# Patient Record
Sex: FEMALE | Race: WHITE | NOT HISPANIC OR LATINO | Employment: OTHER | URBAN - METROPOLITAN AREA
[De-identification: names, ages, dates, MRNs, and addresses within clinical notes are randomized per-mention and may not be internally consistent; named-entity substitution may affect disease eponyms.]

---

## 2017-05-04 ASSESSMENT — MIFFLIN-ST. JEOR: SCORE: 1268.38

## 2017-05-07 ENCOUNTER — ANESTHESIA - HEALTHEAST (OUTPATIENT)
Dept: SURGERY | Facility: CLINIC | Age: 47
End: 2017-05-07

## 2017-05-08 ENCOUNTER — SURGERY - HEALTHEAST (OUTPATIENT)
Dept: SURGERY | Facility: CLINIC | Age: 47
End: 2017-05-08

## 2017-05-08 ENCOUNTER — RECORDS - HEALTHEAST (OUTPATIENT)
Dept: ADMINISTRATIVE | Facility: OTHER | Age: 47
End: 2017-05-08

## 2017-05-08 LAB
BKR LAB AP ABNORMAL BLEEDING: NO
BKR LAB AP BIRTH CONTROL/HORMONES: NORMAL
BKR LAB AP CERVICAL APPEARANCE: NORMAL
BKR LAB AP GYN ADEQUACY: NORMAL
BKR LAB AP GYN INTERPRETATION: NORMAL
BKR LAB AP HPV REFLEX: NORMAL
BKR LAB AP LMP: NORMAL
BKR LAB AP PATIENT STATUS: NORMAL
BKR LAB AP PREVIOUS ABNORMAL: NORMAL
BKR LAB AP PREVIOUS NORMAL: NORMAL
HIGH RISK?: NO
HPV INTERPRETATION - HISTORICAL: NORMAL
HPV INTERPRETER - HISTORICAL: NORMAL
PATH REPORT.COMMENTS IMP SPEC: NORMAL
RESULT FLAG (HE HISTORICAL CONVERSION): NORMAL

## 2018-07-18 ENCOUNTER — RECORDS - HEALTHEAST (OUTPATIENT)
Dept: LAB | Facility: CLINIC | Age: 48
End: 2018-07-18

## 2018-07-18 LAB
CHOLEST SERPL-MCNC: 241 MG/DL
FASTING STATUS PATIENT QL REPORTED: NO
HDLC SERPL-MCNC: 83 MG/DL
LDLC SERPL CALC-MCNC: 122 MG/DL
TRIGL SERPL-MCNC: 182 MG/DL
TSH SERPL DL<=0.005 MIU/L-ACNC: 0.53 UIU/ML (ref 0.3–5)

## 2018-11-09 ENCOUNTER — RECORDS - HEALTHEAST (OUTPATIENT)
Dept: ADMINISTRATIVE | Facility: OTHER | Age: 48
End: 2018-11-09

## 2018-11-09 ENCOUNTER — RECORDS - HEALTHEAST (OUTPATIENT)
Dept: LAB | Facility: CLINIC | Age: 48
End: 2018-11-09

## 2018-11-11 LAB — BACTERIA SPEC CULT: ABNORMAL

## 2018-11-27 ENCOUNTER — RECORDS - HEALTHEAST (OUTPATIENT)
Dept: ADMINISTRATIVE | Facility: OTHER | Age: 48
End: 2018-11-27

## 2019-01-16 ENCOUNTER — RECORDS - HEALTHEAST (OUTPATIENT)
Dept: ADMINISTRATIVE | Facility: OTHER | Age: 49
End: 2019-01-16

## 2019-01-22 ENCOUNTER — RECORDS - HEALTHEAST (OUTPATIENT)
Dept: ADMINISTRATIVE | Facility: OTHER | Age: 49
End: 2019-01-22

## 2019-01-22 ENCOUNTER — AMBULATORY - HEALTHEAST (OUTPATIENT)
Dept: VASCULAR SURGERY | Facility: CLINIC | Age: 49
End: 2019-01-22

## 2019-01-22 DIAGNOSIS — S81.001D OPEN WOUND OF RIGHT KNEE, SUBSEQUENT ENCOUNTER: ICD-10-CM

## 2019-02-05 ENCOUNTER — COMMUNICATION - HEALTHEAST (OUTPATIENT)
Dept: VASCULAR SURGERY | Facility: CLINIC | Age: 49
End: 2019-02-05

## 2020-05-12 ENCOUNTER — RECORDS - HEALTHEAST (OUTPATIENT)
Dept: ADMINISTRATIVE | Facility: OTHER | Age: 50
End: 2020-05-12

## 2020-05-12 ENCOUNTER — HOSPITAL ENCOUNTER (OUTPATIENT)
Dept: PHYSICAL MEDICINE AND REHAB | Facility: CLINIC | Age: 50
Discharge: HOME | End: 2020-05-12
Attending: PAIN MEDICINE

## 2020-05-12 DIAGNOSIS — S22.49XA MULTIPLE RIB FRACTURES INVOLVING FOUR OR MORE RIBS: ICD-10-CM

## 2020-05-12 DIAGNOSIS — M79.18 MYOFASCIAL PAIN: ICD-10-CM

## 2020-05-14 ENCOUNTER — HOSPITAL ENCOUNTER (OUTPATIENT)
Dept: PHYSICAL MEDICINE AND REHAB | Facility: CLINIC | Age: 50
Discharge: HOME | End: 2020-05-14
Attending: PAIN MEDICINE

## 2020-05-14 DIAGNOSIS — S22.49XA MULTIPLE RIB FRACTURES INVOLVING FOUR OR MORE RIBS: ICD-10-CM

## 2020-05-22 ENCOUNTER — HOSPITAL ENCOUNTER (OUTPATIENT)
Dept: PHYSICAL MEDICINE AND REHAB | Facility: CLINIC | Age: 50
Discharge: HOME | End: 2020-05-22
Attending: PAIN MEDICINE

## 2020-05-22 DIAGNOSIS — S22.49XA MULTIPLE RIB FRACTURES INVOLVING FOUR OR MORE RIBS: ICD-10-CM

## 2020-05-22 DIAGNOSIS — M79.18 MYOFASCIAL PAIN: ICD-10-CM

## 2020-05-28 ENCOUNTER — HOSPITAL ENCOUNTER (OUTPATIENT)
Dept: PHYSICAL MEDICINE AND REHAB | Facility: CLINIC | Age: 50
Discharge: HOME | End: 2020-05-28
Attending: PAIN MEDICINE

## 2020-05-28 DIAGNOSIS — M79.18 MYOFASCIAL PAIN: ICD-10-CM

## 2020-05-28 DIAGNOSIS — S22.49XA MULTIPLE RIB FRACTURES INVOLVING FOUR OR MORE RIBS: ICD-10-CM

## 2020-05-28 RX ORDER — SALSALATE 500 MG/1
TABLET, FILM COATED ORAL
Status: SHIPPED | COMMUNITY
Start: 2020-05-22 | End: 2021-08-27

## 2021-04-01 ENCOUNTER — RECORDS - HEALTHEAST (OUTPATIENT)
Dept: ADMINISTRATIVE | Facility: OTHER | Age: 51
End: 2021-04-01

## 2021-04-01 ENCOUNTER — RECORDS - HEALTHEAST (OUTPATIENT)
Dept: SCHEDULING | Facility: CLINIC | Age: 51
End: 2021-04-01

## 2021-04-01 DIAGNOSIS — Z12.31 VISIT FOR SCREENING MAMMOGRAM: ICD-10-CM

## 2021-05-26 ENCOUNTER — RECORDS - HEALTHEAST (OUTPATIENT)
Dept: ADMINISTRATIVE | Facility: CLINIC | Age: 51
End: 2021-05-26

## 2021-05-28 ENCOUNTER — RECORDS - HEALTHEAST (OUTPATIENT)
Dept: ADMINISTRATIVE | Facility: CLINIC | Age: 51
End: 2021-05-28

## 2021-05-30 VITALS — WEIGHT: 141 LBS | HEIGHT: 66 IN | BODY MASS INDEX: 22.66 KG/M2

## 2021-06-08 NOTE — PROGRESS NOTES
"Evelyn Hudson is a 49 y.o. female who is being evaluated via a billable video visit.      The patient has been notified of following:     \"This video visit will be conducted via a call between you and your physician/provider. We have found that certain health care needs can be provided without the need for an in-person physical exam.  This service lets us provide the care you need with a video conversation.  If a prescription is necessary we can send it directly to your pharmacy.  If lab work is needed we can place an order for that and you can then stop by our lab to have the test done at a later time.    Video visits are billed at different rates depending on your insurance coverage. Please reach out to your insurance provider with any questions.    If during the course of the call the physician/provider feels a video visit is not appropriate, you will not be charged for this service.\"    Patient has given verbal consent to a Video visit? Yes    Patient would like to receive their AVS by AVS Preference: Mail a copy.    Patient would like the video invitation sent by: Text to cell phone: [phone]    Will anyone else be joining your video visit? No        Video Start Time: 09:02    Additional provider notes:   ASSESSMENT: Evelyn Hudson is a 49 y.o. female who presents for consultation at the request of [facility] PCP Maribell Betancur CNP, with a past medical history significant for abnormal uterine bleeding, multiple rib fractures involving for more ribs, alcohol withdrawal uncomplicated, fall, injectable pain, anxiety, chemical dependency, seasonal allergies who presents today for new patient evaluation of pain from rib fractures:    -Patient has intractable pain from right rib fractures of 7, 8, 9, 10 that are displaced.  She had good temporary relief with intercostal nerve blocks in the hospital.  She has been hospitalized twice secondary to intractable pain.  Her primary care provider continues to provide pain " control with Dilaudid 2 to 4 mg every 4 hours as needed as well as methocarbamol and salicylate.    Patient is neurologically intact on exam. No myelopathic or red flag symptoms.     NDI Score: 56    WHO 5: 4     Diagnoses and all orders for this visit:    Multiple rib fractures involving four or more ribs  -     OPS US Guided Intercostal Block; Future; Expected date: 05/19/2020    Myofascial pain      PLAN:  Reviewed spine anatomy and disease process. Discussed diagnosis and treatment options with the patient today. A shared decision making model was used.  The patient's values and choices were respected. The following represents what was discussed and decided upon by the provider and the patient.      -DIAGNOSTIC TESTS:  Images were personally reviewed and interpreted and explained to patient today using spine model.   --X-ray on 4/28/2020 is personally viewed images interpreted and discussed with the patient.  It does show fractures of ribs of posterior right seventh eighth ninth and 10th ribs with no pneumothorax.    -PHYSICAL THERAPY: None at this time.  I recommend that she continue with incentive spirometry which she is.    -MEDICATIONS: Patient is currently taking 2 to 4 mg of hydromorphone every 4 hours as well as methocarbamol every 8 hours and salicylate to 3 times daily 500 mg.  -  Discussed multiple medication options today with patient. Discussed risks, side effects, and proper use of medications. Patient verbalized understanding.    -INTERVENTIONS: I have ordered right ribs 7, 8, 9, and 10 intercostal nerve blocks under ultrasound guidance urgently this Thursday.  Discussed risks and benefits of injections with patient today.    -PATIENT EDUCATION: We discussed pain management in a multimodal fashion including medication and injections.    -FOLLOW-UP:   Patient will follow-up 1 week after the intercostal nerve blocks via video visit.    Advised patient to call the [facility] if symptoms worsen or  you have problems controlling bladder and bowel function.   ______________________________________________________________________    SUBJECTIVE:  HPI:  Evelyn Hudson  Is a 49 y.o. female who presents today for new patient evaluation of right-sided rib cage pain secondary to rib fractures.  Patient fell off a ladder with subsequent for rib fractures was admitted for pain control on 4/28/2020 and discharged on 5/1/2020.  She obtained intercostal nerve blocks while in the hospital.  She was discharged on hydromorphone 2 to 4 mg every 4 hours as needed.  She is also discharged on methocarbamol 750 mg every 8 hours as needed.  The patient was referred to the spine center for further evaluation and follow-up.  Patient did have good temporary relief with the intercostal nerve blocks, however pain has returned.  She reports that pain is better than what it was when she was in the hospital.  She reports that she was hospitalized twice for pain and after the intercostal nerve blocks pain did lessen some.  She is currently taking Dilaudid 2 to 4 mg every 4 hours as well as methocarbamol 750 mg every 8 hours and salicylate 500 mg twice daily.  She notes that this combination of pain medication has been making things more tolerable for her.  She notes that pain is worse when she is sitting and somewhat improved when she is lying down or standing.  She does have a soft wrap type brace around her waist which she applies ice to her ribs constantly.  This does help some.  She does have a history of alcoholism and after this fall was drinking alcohol to try and cope with pain before going in for about 4 days.  Now this is not a problem per the patient.  Patient does take amoxicillin for acne, however does not have any infections at this time.  Her pain today is 7/10.  It is along the right posterior lateral rib cage area.  She denies any bowel or bladder changes, fevers, chills, unintentional weight loss.    -Treatment to Date:  X-ray of rib cage dated 4/28/2020.  Right sixth through 11th intercostal nerve blocks under fluoroscopic guidance on 4/30/2020.    -Medications:    Current Outpatient Medications on File Prior to Encounter   Medication Sig Dispense Refill     acetaminophen (TYLENOL) 500 MG tablet Take 2 tablets (1,000 mg total) by mouth 3 (three) times a day.  0     amoxicillin (AMOXIL) 500 MG capsule Take 500 mg by mouth 2 (two) times a day.       calcium carbonate-vitamin D3 (CALCIUM 600 + D,3,) 600 mg(1,500mg) -200 unit per tablet Take 1 tablet by mouth at bedtime.        carboxymethylcellulose (REFRESH LIQUIGEL) 1 % ophthalmic solution Apply 1 drop to eye 2 (two) times a day as needed.       cetirizine-pseudoephedrine (ZYRTEC-D) 5-120 mg per tablet Take 1 tablet by mouth 2 (two) times a day.       escitalopram oxalate (LEXAPRO) 20 MG tablet Take 20 mg by mouth daily.       fluticasone (FLONASE) 50 mcg/actuation nasal spray 2 sprays into each nostril daily.       folic acid (FOLVITE) 1 MG tablet Take 1 tablet (1 mg total) by mouth daily. 90 tablet 0     glucosamine-chondroitin 500-400 mg cap Take 1 capsule by mouth at bedtime.        HYDROmorphone (DILAUDID) 2 MG tablet Take 1-2 tablets (2-4 mg total) by mouth every 4 (four) hours as needed for pain. 32 tablet 0     Lactobacillus rhamnosus GG (CULTURELLE) 10-15 Billion cell capsule Take 1 capsule by mouth at bedtime.        methocarbamoL (ROBAXIN) 750 MG tablet Take 1 tablet (750 mg total) by mouth every 8 (eight) hours as needed. 21 tablet 0     multivitamin capsule Take 1 capsule by mouth at bedtime.        nicotine polacrilex (NICORETTE) 4 MG gum Apply 4 mg to the mouth or throat as needed for smoking cessation.       omeprazole (PRILOSEC) 20 MG capsule Take 1 capsule (20 mg total) by mouth 2 (two) times a day before meals. 60 capsule 0     polyethylene glycol (MIRALAX) 17 gram packet Take 1 packet (17 g total) by mouth daily as needed.  0     thiamine 100 MG tablet Take 1  tablet (100 mg total) by mouth daily.  0     traZODone (DESYREL) 50 MG tablet Take 50 mg by mouth at bedtime.        naproxen sodium (ALEVE) 220 MG tablet Take 220 mg by mouth every 12 (twelve) hours as needed for pain.       No current facility-administered medications on file prior to encounter.        Allergies   Allergen Reactions     Ciprofloxacin Anaphylaxis     Throat swelling       Past Medical History:   Diagnosis Date     Acne      Allergic rhinitis      Anxiety         Patient Active Problem List   Diagnosis     Abnormal uterine bleeding     Multiple rib fractures involving four or more ribs     Alcohol withdrawal, uncomplicated (H)     Fall, initial encounter     Closed fracture of four ribs, unspecified laterality, initial encounter     Intractable pain     Anxiety state     Chemical dependency (H)     Seasonal allergies       Past Surgical History:   Procedure Laterality Date     COLON SURGERY       IR INTERCOSTAL STEROID INJECTION SINGLE LEVEL  4/30/2020     LIPOMA RESECTION N/A 5/8/2017    Procedure: EXCISION BACK AND RIGHT ARM LIPOMA;  Surgeon: Rickey Anne MD;  Location: [facility];  Service:      RECTAL POLYPECTOMY       TUBAL LIGATION Bilateral 6/3/2014    Procedure: BILATERAL LAPAROSCOPIC TUBAL LIGATION ;  Surgeon: Lorena Jiménez MD;  Location: [facility];  Service:      Family history: Her mother has had cancer.  Her father has heart disease and has had a stroke.  Reviewed past medical, surgical, and family history with patient found on new patient intake packet located in [employer]MR [employer] tab.     SOCIAL HX: Patient does smoke.  She does have a history of alcoholism, however has been sober for 6 years and recently started drinking occasionally in the last year.  She denies use of recreational drugs.    ROS: Specifically negative for bowel/bladder dysfunction, balance changes, headache, dizziness, foot drop, fevers, chills, appetite changes, nausea/vomiting, unexplained weight  loss. Otherwise 13 systems reviewed are negative. Please see the patient's intake questionnaire from today for details.    OBJECTIVE:  There were no vitals taken for this visit.    PHYSICAL EXAMINATION:    --CONSTITUTIONAL:  Vital signs as above.  No acute distress.  The patient is well nourished and well groomed.  --PSYCHIATRIC:  Appropriate mood and affect. The patient is awake, alert, oriented to person, place, time and answering questions appropriately with clear speech.  Patient is crying on exam secondary to pain.  --SKIN:  Skin over the face and posterior torso is covered with a stretch brace.  --RESPIRATORY: Normal rhythm and effort. No abnormal accessory muscle breathing patterns noted.   --ABDOMINAL:  Soft, non-distended, non-tender throughout all quadrants.   --STANDING EXAMINATION:  Normal lumbar lordosis noted, no lateral shift.  --MUSCULOSKELETAL: Thoracic spine inspection reveals no evidence of deformity.  She points to her right posterior lateral rib cage where her pain is.  --GROSS MOTOR: Gait is non-antalgic.    --LOWER EXTREMITY MOTOR TESTING:  Lower extremity strength appears to be full.    RESULTS: Prior medical records from [facility] discharge summary were reviewed today.    Imaging: Lumbar spine Imaging was personally reviewed and interpreted today. The images were shown to the patient and the findings were explained using a spine model.      Xr Chest 2 Views    Result Date: 4/28/2020  EXAM: XR CHEST 2 VIEWS LOCATION: [facility] DATE/TIME: 4/28/2020 7:02 AM INDICATION: Chest pain and shortness of breath with known rib fractures COMPARISON: 04/25/2020.     There is new mild atelectasis at the right lung base. Again noted are displaced fractures of the right posterior seventh, eighth, ninth and 10th ribs. No pneumothorax. There is minimal blunting of the right posterior costophrenic angle consistent with a tiny right pleural effusion. The left lung is clear. The heart size and  pulmonary vascularity are normal.    Xr Ribs Right W Pa Chest    Result Date: 4/25/2020  EXAM: XR RIBS RIGHT W PA CHEST LOCATION: [facility] DATE/TIME: 4/25/2020 9:27 PM INDICATION: trauma 3 days ago, right lateral rib pain COMPARISON: None.     The visualized heart and lungs are negative. There are displaced fractures of the posterior right seventh, eighth, ninth, and 10th ribs. There is mild lateral curvature of the spine.     Ir Intercostal Steroid Injection Single Level    Result Date: 4/30/2020  [place] [facility] LOCATION: [facility] DATE: 4/30/2020 Procedure: Fluoroscopic-guided right intercostal nerve blocks right sixth through 11th ribs OPERATORS:  Olaf Ovalles MD MEDICATIONS: 0.25 % Marcaine CONTRAST: 6 mL Omnipaque 300 IMAGES: 9 FLUOROSCOPY TIME: 9.6 minutes ESTIMATED BLOOD LOSS: None COMPLICATIONS: None PRE-PROCEDURE DIAGNOSIS: Acute pain associated with rib fractures right seventh eighth ninth and 10th ribs. POST-PROCEDURE DIAGNOSIS: Same CLINICAL HISTORY/INDICATION: Intractable pain following right-sided rib fractures. PROCEDURE AND FINDINGS: The patient was placed prone on the fluoroscopy table. Using fluoroscopic guidance the right sixth, seventh, eighth, ninth, 10th and 11th ribs were marked. The patient was prepped and draped in a sterile fashion. Using fluoroscopic guidance, 4 mL of 0.25% Marcaine was injected into the right intercostal spaces of the sixth, seventh, eighth, ninth, 10th and 11th ribs. Contrast was injected at each level to confirm position. Patient tolerated the procedure well no immediate complications. The patient noted significant improvement in her pain following the intercostal nerve blocks. The patient tolerated the procedure well and left radiology in stable condition.     Impression: Successful intercostal nerve blocks of the right sixth, seventh, eighth, ninth, 10th and 11th ribs.     Video-Visit Details    Type of service:  Video Visit    Video End  Time (time video stopped): 9:29 AM  Originating Location (pt. Location): Home    Distant Location (provider location):  [facility] [place]     Platform used for Video Visit: Tavo Garcia DO

## 2021-06-08 NOTE — PATIENT INSTRUCTIONS - HE
Please take acetaminophen 1000 mg three times a day     I have ordered Naproxen 500 mg two times a day with food.    Continue to ice, apply Biofreeze and use your wrap.    ~Please call Nurse Navigation line [phone] with any questions or concerns about your treatment plan, if symptoms worsen and you would like to be seen urgently, or if you have problems controlling bladder and bowel function.

## 2021-06-08 NOTE — PATIENT INSTRUCTIONS - HE
I have ordered intercostal nerve blocks of the right seventh, eighth, ninth, and 10th rib.    ~Please call Nurse Navigation line [phone] with any questions or concerns about your treatment plan, if symptoms worsen and you would like to be seen urgently, or if you have problems controlling bladder and bowel function.

## 2021-06-08 NOTE — PATIENT INSTRUCTIONS - HE
~Please call Nurse Navigation line [phone] with any questions or concerns about your treatment plan, if symptoms worsen and you would like to be seen urgently, or if you have problems controlling bladder and bowel function.

## 2021-06-08 NOTE — PROGRESS NOTES
"Evelyn Hudson is a 49 y.o. female who is being evaluated via a billable video visit.      The patient has been notified of following:     \"This video visit will be conducted via a call between you and your physician/provider. We have found that certain health care needs can be provided without the need for an in-person physical exam.  This service lets us provide the care you need with a video conversation.  If a prescription is necessary we can send it directly to your pharmacy.  If lab work is needed we can place an order for that and you can then stop by our lab to have the test done at a later time.    Video visits are billed at different rates depending on your insurance coverage. Please reach out to your insurance provider with any questions.    If during the course of the call the physician/provider feels a video visit is not appropriate, you will not be charged for this service.\"    Patient has given verbal consent to a Video visit? Yes    Patient would like to receive their AVS by AVS Preference: Oliver.    Patient would like the video invitation sent by: Text to cell phone: [phone]    Will anyone else be joining your video visit? No        Video Start Time: 10:15 AM    Additional provider notes:     Assessment:     There are no diagnoses linked to this encounter.   Evelyn Hudson is a 49 y.o. y.o. female with past medical history significant for abnormal uterine bleeding, multiple rib fractures involving for more ribs, alcohol withdrawal uncomplicated, fall, injectable pain, anxiety, chemical dependency, seasonal allergies who presents today for follow-up regarding pain from rib fractures:    -Patient continues to do better than she was prior to intercostal nerve blocks.  She continues to have severe pain at times, however at times she is not having pain at all.     Plan:     A shared decision making plan was used. The patient's values and choices were respected. Prior medical records from our " last visit on 5/22/2020 were reviewed today. The following represents what was discussed and decided upon by the provider and the patient.        -DIAGNOSTIC TESTS: Images were personally reviewed and interpreted.   --X-ray on 4/28/2020 is personally viewed images interpreted and discussed with the patient.  It does show fractures of ribs of posterior right seventh eighth ninth and 10th ribs with no pneumothorax.    -INTERVENTIONS: No interventions at this time.  Could consider repeat intercostal nerve blocks in the future.    -MEDICATIONS: Patient is currently taking Dilaudid 4 mg up to 6 times daily as well as methocarbamol in the morning and evening.  She is no longer taking ibuprofen or acetaminophen.  -  Discussed side effects of medications and proper use. Patient verbalized understanding.    -PHYSICAL THERAPY: No therapy at this time.        -PATIENT EDUCATION: We discussed pain management in a multiple fashion including medication management and possible future injections.    -FOLLOW UP: Patient will follow-up as needed.  Advised to contact clinic if symptoms worsen or change.    Subjective:     Evelyn Hudson is a 49 y.o. female who presents today for follow-up regarding right-sided rib pain.  Patient reports that she continues to do somewhat better since intercostal nerve blocks.  Her pain today currently 0/10 at its worse is 10/10 is best a 0/10.  She did tried alternate between ibuprofen and acetaminophen, this was not helpful she is now taking our continuing with hydromorphone 4 mg up to 6 times daily and is recently started methocarbamol twice daily.  She notes that certain movements can cause pain.  She hears clicking of her ribs.  She is able to sleep through the night which is a very big positive for her.  She denies any bowel or bladder changes, fevers, chills, unintentional weight loss.  She did see her primary care provider who continues to prescribe the hydromorphone for her.  Her pain is on  the right side and sharp in nature at times.    -Treatment to Date: X-ray of rib cage dated 4/28/2020.  Right sixth through 11th intercostal nerve blocks under fluoroscopic guidance on 4/30/2020.  Right intercostal nerve block at right ribs 7, 8, 9 and 10 under ultrasound guidance on 5/14/2020.    Patient Active Problem List   Diagnosis     Abnormal uterine bleeding     Multiple rib fractures involving four or more ribs     Alcohol withdrawal, uncomplicated (H)     Fall, initial encounter     Closed fracture of four ribs, unspecified laterality, initial encounter     Intractable pain     Anxiety state     Chemical dependency (H)     Seasonal allergies       Current Outpatient Medications on File Prior to Visit   Medication Sig Dispense Refill     acetaminophen (TYLENOL) 500 MG tablet Take 2 tablets (1,000 mg total) by mouth 3 (three) times a day.  0     amoxicillin (AMOXIL) 500 MG capsule Take 500 mg by mouth 2 (two) times a day.       calcium carbonate-vitamin D3 (CALCIUM 600 + D,3,) 600 mg(1,500mg) -200 unit per tablet Take 1 tablet by mouth at bedtime.        carboxymethylcellulose (REFRESH LIQUIGEL) 1 % ophthalmic solution Apply 1 drop to eye 2 (two) times a day as needed.       cetirizine-pseudoephedrine (ZYRTEC-D) 5-120 mg per tablet Take 1 tablet by mouth 2 (two) times a day.       escitalopram oxalate (LEXAPRO) 20 MG tablet Take 20 mg by mouth daily.       fluticasone (FLONASE) 50 mcg/actuation nasal spray 2 sprays into each nostril daily.       folic acid (FOLVITE) 1 MG tablet Take 1 tablet (1 mg total) by mouth daily. 90 tablet 0     glucosamine-chondroitin 500-400 mg cap Take 1 capsule by mouth at bedtime.        HYDROmorphone (DILAUDID) 2 MG tablet Take 1-2 tablets (2-4 mg total) by mouth every 4 (four) hours as needed for pain. 32 tablet 0     Lactobacillus rhamnosus GG (CULTURELLE) 10-15 Billion cell capsule Take 1 capsule by mouth at bedtime.        methocarbamoL (ROBAXIN) 750 MG tablet Take 1 tablet  (750 mg total) by mouth every 8 (eight) hours as needed. 21 tablet 0     multivitamin capsule Take 1 capsule by mouth at bedtime.        naproxen (NAPROSYN) 500 MG tablet Take 1 tablet (500 mg total) by mouth 2 (two) times a day with meals. Take with food/water to prevent stomach upset. 60 tablet 1     naproxen sodium (ALEVE) 220 MG tablet Take 220 mg by mouth every 12 (twelve) hours as needed for pain.       nicotine polacrilex (NICORETTE) 4 MG gum Apply 4 mg to the mouth or throat as needed for smoking cessation.       omeprazole (PRILOSEC) 20 MG capsule Take 1 capsule (20 mg total) by mouth 2 (two) times a day before meals. 60 capsule 0     polyethylene glycol (MIRALAX) 17 gram packet Take 1 packet (17 g total) by mouth daily as needed.  0     thiamine 100 MG tablet Take 1 tablet (100 mg total) by mouth daily.  0     traZODone (DESYREL) 50 MG tablet Take 50 mg by mouth at bedtime.        No current facility-administered medications on file prior to visit.        Allergies   Allergen Reactions     Ciprofloxacin Anaphylaxis     Throat swelling       Past Medical History:   Diagnosis Date     Acne      Allergic rhinitis      Anxiety         Review of Systems  ROS:  Specifically negative for bowel/bladder dysfunction, balance changes, headache, dizziness, foot drop, fevers, chills, appetite changes, nausea/vomiting, unexplained weight loss. Otherwise 13 systems reviewed are negative. Please see the patient's intake questionnaire from today for details.    Reviewed Social, Family, Past Medical and Past Surgical history with patient, no significant changes noted since prior visit.     Objective:     There were no vitals taken for this visit.    PHYSICAL EXAMINATION:    --CONSTITUTIONAL: Well developed, well nourished, healthy appearing individual.  --PSYCHIATRIC: Appropriate mood and affect. No difficulty interacting due to temper, social withdrawal, or memory issues.  --SKIN: Face is dry and intact.   --RESPIRATORY:  Normal rhythm and effort. No abnormal accessory muscle breathing patterns noted.   --MUSCULOSKELETAL:  Normal lumbar lordosis noted, no lateral shift.  --GROSS MOTOR: Easily arises from a seated position. Gait is non-antalgic  --Thoracic SPINE: She points to her right rib cage where the pain is.  --LOWER EXTREMITY MOTOR TESTING:  Appears to have full strength in her lower extremities.    RESULTS:   Imaging: Lumbar spine imaging was reviewed today. The images were shown to the patient and the findings were explained using a spine model.    Ir Intercostal Steroid Injection Single Level    Result Date: 4/30/2020  [facility] LOCATION: [facility] DATE: 4/30/2020 Procedure: Fluoroscopic-guided right intercostal nerve blocks right sixth through 11th ribs OPERATORS:  Olaf Ovalles MD MEDICATIONS: 0.25 % Marcaine CONTRAST: 6 mL Omnipaque 300 IMAGES: 9 FLUOROSCOPY TIME: 9.6 minutes ESTIMATED BLOOD LOSS: None COMPLICATIONS: None PRE-PROCEDURE DIAGNOSIS: Acute pain associated with rib fractures right seventh eighth ninth and 10th ribs. POST-PROCEDURE DIAGNOSIS: Same CLINICAL HISTORY/INDICATION: Intractable pain following right-sided rib fractures. PROCEDURE AND FINDINGS: The patient was placed prone on the fluoroscopy table. Using fluoroscopic guidance the right sixth, seventh, eighth, ninth, 10th and 11th ribs were marked. The patient was prepped and draped in a sterile fashion. Using fluoroscopic guidance, 4 mL of 0.25% Marcaine was injected into the right intercostal spaces of the sixth, seventh, eighth, ninth, 10th and 11th ribs. Contrast was injected at each level to confirm position. Patient tolerated the procedure well no immediate complications. The patient noted significant improvement in her pain following the intercostal nerve blocks. The patient tolerated the procedure well and left radiology in stable condition.     Impression: Successful intercostal nerve blocks of the right sixth, seventh,  eighth, ninth, 10th and 11th ribs.     Video-Visit Details    Type of service:  Video Visit    Video End Time (time video stopped): 10:22 AM  Originating Location (pt. Location): Home    Distant Location (provider location):  [facility]     Platform used for Video Visit: Tavo Garcia DO

## 2021-06-08 NOTE — PROGRESS NOTES
"Evelyn Hudson is a 49 y.o. female who is being evaluated via a billable video visit.      The patient has been notified of following:     \"This video visit will be conducted via a call between you and your physician/provider. We have found that certain health care needs can be provided without the need for an in-person physical exam.  This service lets us provide the care you need with a video conversation.  If a prescription is necessary we can send it directly to your pharmacy.  If lab work is needed we can place an order for that and you can then stop by our lab to have the test done at a later time.    Video visits are billed at different rates depending on your insurance coverage. Please reach out to your insurance provider with any questions.    If during the course of the call the physician/provider feels a video visit is not appropriate, you will not be charged for this service.\"    Patient has given verbal consent to a Video visit? Yes    Patient would like to receive their AVS by AVS Preference: Mail a copy.    Patient would like the video invitation sent by: Text to cell phone: [phone]    Will anyone else be joining your video visit? No        Video Start Time: 3:10 PM    Additional provider notes:     Assessment:     Diagnoses and all orders for this visit:    Multiple rib fractures involving four or more ribs  -     naproxen (NAPROSYN) 500 MG tablet; Take 1 tablet (500 mg total) by mouth 2 (two) times a day with meals. Take with food/water to prevent stomach upset.  Dispense: 60 tablet; Refill: 1    Myofascial pain       Evelyn Hudson is a 49 y.o. y.o. female with past medical history significant for abnormal uterine bleeding, multiple rib fractures involving for more ribs, alcohol withdrawal uncomplicated, fall, injectable pain, anxiety, chemical dependency, seasonal allergies who presents today for follow-up regarding pain from rib fractures:    -The patient had right intercostal nerve " blocks of the right seventh, eighth, ninth, 10th ribs under ultrasound guidance on 5/14/2020.  She has had about 50% relief with these injections and is doing better.  She is still having periods of pain, however feels that she is not having the crisis moments that she was having before the injections.     Plan:     A shared decision making plan was used. The patient's values and choices were respected. Prior medical records from our last visit on 5/12/2020 were reviewed today. The following represents what was discussed and decided upon by the provider and the patient.        -DIAGNOSTIC TESTS: Images were personally reviewed and interpreted.   --X-ray on 4/28/2020 is personally viewed images interpreted and discussed with the patient.  It does show fractures of ribs of posterior right seventh eighth ninth and 10th ribs with no pneumothorax.    -INTERVENTIONS: No interventions at this time.    -MEDICATIONS: Primary care provider continues to prescribe Dilaudid 2 to 4 mg.  She is trying to wean off this, however is anxious about not having pain like she was having when she was in the hospital for her fractures.  I recommend that she schedule acetaminophen 1000 mg 3 times a day.  I have written a prescription for naproxen 500 mg that she can take twice daily with food.  Recommend that she continue using her Biofreeze and applying ice as well as using the ronak binder that has been helping with her pain as well.  -  Discussed side effects of medications and proper use. Patient verbalized understanding.    -PHYSICAL THERAPY: None at this time.       -PATIENT EDUCATION:  20 minutes of total visit time was spent face to face with the patient today, 60 % of the visit was spent on counseling, education, and coordinating care.     -FOLLOW UP: The patient will follow-up next week via video visit.  Advised to contact clinic if symptoms worsen or change.    Subjective:     Evelyn Hudson is a 49 y.o. female who presents  today for follow-up regarding right that rib pain.  Patient reports that she is doing better since having intercostal nerve blocks last week.  She has been able to sleep through the night now.  She also reports that she has been able to walk and go to work.  There are times that she has a sharp pain, however is not as bad as it was prior to these injections.  She notes that she continues to need to take Dilaudid every 3 or 4 hours for pain she has stopped taking acetaminophen and is scared to take naproxen with the Dilaudid.  She notes that Biofreeze and ice have been helpful as well.  She denies any bowel or bladder changes, fevers, chills, unintentional weight loss.  Pain is sharp in nature.    -Treatment to Date: X-ray of rib cage dated 4/28/2020.  Right sixth through 11th intercostal nerve blocks under fluoroscopic guidance on 4/30/2020.  Right intercostal nerve block at right ribs 7, 8, 9 and 10 under ultrasound guidance on 5/14/2020.       Patient Active Problem List   Diagnosis     Abnormal uterine bleeding     Multiple rib fractures involving four or more ribs     Alcohol withdrawal, uncomplicated (H)     Fall, initial encounter     Closed fracture of four ribs, unspecified laterality, initial encounter     Intractable pain     Anxiety state     Chemical dependency (H)     Seasonal allergies       Current Outpatient Medications on File Prior to Encounter   Medication Sig Dispense Refill     acetaminophen (TYLENOL) 500 MG tablet Take 2 tablets (1,000 mg total) by mouth 3 (three) times a day.  0     calcium carbonate-vitamin D3 (CALCIUM 600 + D,3,) 600 mg(1,500mg) -200 unit per tablet Take 1 tablet by mouth at bedtime.        carboxymethylcellulose (REFRESH LIQUIGEL) 1 % ophthalmic solution Apply 1 drop to eye 2 (two) times a day as needed.       escitalopram oxalate (LEXAPRO) 20 MG tablet Take 20 mg by mouth daily.       fluticasone (FLONASE) 50 mcg/actuation nasal spray 2 sprays into each nostril daily.        folic acid (FOLVITE) 1 MG tablet Take 1 tablet (1 mg total) by mouth daily. 90 tablet 0     HYDROmorphone (DILAUDID) 2 MG tablet Take 1-2 tablets (2-4 mg total) by mouth every 4 (four) hours as needed for pain. 32 tablet 0     multivitamin capsule Take 1 capsule by mouth at bedtime.        naproxen sodium (ALEVE) 220 MG tablet Take 220 mg by mouth every 12 (twelve) hours as needed for pain.       omeprazole (PRILOSEC) 20 MG capsule Take 1 capsule (20 mg total) by mouth 2 (two) times a day before meals. 60 capsule 0     polyethylene glycol (MIRALAX) 17 gram packet Take 1 packet (17 g total) by mouth daily as needed.  0     thiamine 100 MG tablet Take 1 tablet (100 mg total) by mouth daily.  0     traZODone (DESYREL) 50 MG tablet Take 50 mg by mouth at bedtime.        amoxicillin (AMOXIL) 500 MG capsule Take 500 mg by mouth 2 (two) times a day.       cetirizine-pseudoephedrine (ZYRTEC-D) 5-120 mg per tablet Take 1 tablet by mouth 2 (two) times a day.       glucosamine-chondroitin 500-400 mg cap Take 1 capsule by mouth at bedtime.        Lactobacillus rhamnosus GG (CULTURELLE) 10-15 Billion cell capsule Take 1 capsule by mouth at bedtime.        methocarbamoL (ROBAXIN) 750 MG tablet Take 1 tablet (750 mg total) by mouth every 8 (eight) hours as needed. 21 tablet 0     nicotine polacrilex (NICORETTE) 4 MG gum Apply 4 mg to the mouth or throat as needed for smoking cessation.       No current facility-administered medications on file prior to encounter.        Allergies   Allergen Reactions     Ciprofloxacin Anaphylaxis     Throat swelling       Past Medical History:   Diagnosis Date     Acne      Allergic rhinitis      Anxiety         Review of Systems  ROS:  Specifically negative for bowel/bladder dysfunction, balance changes, headache, dizziness, foot drop, fevers, chills, appetite changes, nausea/vomiting, unexplained weight loss. Otherwise 13 systems reviewed are negative. Please see the patient's intake  questionnaire from today for details.    Reviewed Social, Family, Past Medical and Past Surgical history with patient, no significant changes noted since prior visit.     Objective:     There were no vitals taken for this visit.    PHYSICAL EXAMINATION:    --CONSTITUTIONAL: Well developed, well nourished, healthy appearing individual.  --PSYCHIATRIC: Appropriate mood and affect. No difficulty interacting due to temper, social withdrawal, or memory issues.  --SKIN: Face skin is dry and intact.   --RESPIRATORY: Normal rhythm and effort. No abnormal accessory muscle breathing patterns noted.   --MUSCULOSKELETAL:  Normal lumbar lordosis noted, no lateral shift.  --GROSS MOTOR: Easily arises from a seated position. Gait is non-antalgic  --Thoracic SPINE:  Inspection reveals no evidence of deformity.  She points to her right rib cage area where her pain is.  --LOWER EXTREMITY MOTOR TESTING:  Appears to have full strength in her lower extremities.  When I saw her last she had full strength and denies any change in strength.    RESULTS:   Imaging: Lumbar spine imaging was reviewed today. The images were shown to the patient and the findings were explained using a spine model.    Xr Chest 2 Views    Result Date: 4/28/2020  EXAM: XR CHEST 2 VIEWS LOCATION: [facility] DATE/TIME: 4/28/2020 7:02 AM INDICATION: Chest pain and shortness of breath with known rib fractures COMPARISON: 04/25/2020.     There is new mild atelectasis at the right lung base. Again noted are displaced fractures of the right posterior seventh, eighth, ninth and 10th ribs. No pneumothorax. There is minimal blunting of the right posterior costophrenic angle consistent with a tiny right pleural effusion. The left lung is clear. The heart size and pulmonary vascularity are normal.    Xr Ribs Right W Pa Chest    Result Date: 4/25/2020  EXAM: XR RIBS RIGHT W PA CHEST LOCATION: [facility] DATE/TIME: 4/25/2020 9:27 PM INDICATION: trauma 3 days ago,  right lateral rib pain COMPARISON: None.     The visualized heart and lungs are negative. There are displaced fractures of the posterior right seventh, eighth, ninth, and 10th ribs. There is mild lateral curvature of the spine.     Ir Intercostal Steroid Injection Single Level    Result Date: 4/30/2020  [facility] LOCATION: [facility] DATE: 4/30/2020 Procedure: Fluoroscopic-guided right intercostal nerve blocks right sixth through 11th ribs OPERATORS:  Olaf Ovalles MD MEDICATIONS: 0.25 % Marcaine CONTRAST: 6 mL Omnipaque 300 IMAGES: 9 FLUOROSCOPY TIME: 9.6 minutes ESTIMATED BLOOD LOSS: None COMPLICATIONS: None PRE-PROCEDURE DIAGNOSIS: Acute pain associated with rib fractures right seventh eighth ninth and 10th ribs. POST-PROCEDURE DIAGNOSIS: Same CLINICAL HISTORY/INDICATION: Intractable pain following right-sided rib fractures. PROCEDURE AND FINDINGS: The patient was placed prone on the fluoroscopy table. Using fluoroscopic guidance the right sixth, seventh, eighth, ninth, 10th and 11th ribs were marked. The patient was prepped and draped in a sterile fashion. Using fluoroscopic guidance, 4 mL of 0.25% Marcaine was injected into the right intercostal spaces of the sixth, seventh, eighth, ninth, 10th and 11th ribs. Contrast was injected at each level to confirm position. Patient tolerated the procedure well no immediate complications. The patient noted significant improvement in her pain following the intercostal nerve blocks. The patient tolerated the procedure well and left radiology in stable condition.     Impression: Successful intercostal nerve blocks of the right sixth, seventh, eighth, ninth, 10th and 11th ribs.     Video-Visit Details    Type of service:  Video Visit    Video End Time (time video stopped): 3:30 PM  Originating Location (pt. Location): Home    Distant Location (provider location):  [facility]     Platform used for Video Visit: Tavo Wright  Jose, DO

## 2021-06-08 NOTE — PATIENT INSTRUCTIONS - HE
Deisy Ornelas, DO                                                    Jennifer Travis, SHASHA Jha,  DO                                                                                SIMONE Mcdonald, DO                                                                                       DISCHARGE INSTRUCTIONS  During office hours (8:00 a.m.- 4:30 p.m.) questions or concerns may be answered  by calling Spine Navigation Nurses at [phone]. If you experience any problems after hours please call [phone] and you will be connected to [facility].     All Patients:  ? You may experience an increase in your symptoms or have some soreness from the injection for the first 2 days (It may take anywhere between 2 days- 2 weeks for the steroid to have maximum effect).  ? You may use ice on the injection site, as frequently as 20 minutes each hour if needed.  ? You may continue taking your regular medication.  ? You may shower. No swimming, tub bath or hot tub for 48 hours.  You may remove your   bandaid/bandage as soon as you are home.  ? You may resume light activities, as tolerated unless otherwise directed.  ? Resume your usual diet as tolerated.      POSSIBLE STEROID SIDE EFFECTS   (If steroid/cortisone was used for your procedure)  -If you experience these symptoms, it should only last for a short period  Swelling of the legs        Skin redness (flushing)  Mouth (oral) irritation   Blood sugar (glucose) levels      Sweats                          Mood changes  Severe headache                  Sleeplessness            POSSIBLE PROCEDURE SIDE EFFECTS  -Call the Spine Center if you are concerned  Increased Pain      Bruising/bleeding at site                  Redness or swelling  Fever greater than 100.5            Diffuse rash            THESE INSTRUCTIONS HAVE BEEN EXPLAINED TO THE PATIENT AND THE PATIENT/PATIENT REPRESENTATITVE HAS VERBALIZED UNDERSTANDING.  A COPY  OF THE INSTRUCTIONS HAVE BEEN GIVEN TO THE PATIENT/PATIENT REPRESENTATIVE.

## 2021-06-10 NOTE — ANESTHESIA POSTPROCEDURE EVALUATION
Patient: Evelyn Hudson  EXCISION BACK AND RIGHT ARM LIPOMA  Anesthesia type: MAC    Patient location: PACU  Last vitals:   Vitals:    05/08/17 0904   BP: 97/58   Pulse: (!) 104   Resp:    Temp: 36.9  C (98.4  F)   SpO2: 97%     Post vital signs: stable  Level of consciousness: awake and responds to simple questions  Post-anesthesia pain: pain controlled  Post-anesthesia nausea and vomiting: no  Pulmonary: unassisted, return to baseline  Cardiovascular: stable and blood pressure at baseline  Hydration: adequate  Anesthetic events: no    QCDR Measures:  ASA# 11 - [facility]aris-op Cardiac Arrest: ASA11B - Patient did NOT experience unanticipated cardiac arrest  ASA# 12 - [facility]-op Mortality Rate: ASA12B - Patient did NOT die  ASA# 13 - PACU Re-Intubation Rate: ASA13B - Patient did NOT require a new airway mgmt  ASA# 10 - Composite Anes Safety: ASA10A - No serious adverse event  ASA# 38 - New Corneal Injury: ASA38A - No new exposure keratitis or corneal abrasion in PACU    Additional Notes:

## 2021-06-10 NOTE — ANESTHESIA CARE TRANSFER NOTE
Last vitals:   Vitals:    05/08/17 0822   BP: 99/56   Pulse: 100   Resp: 18   Temp: 36.8  C (98.3  F)   SpO2: 99%     Patient's level of consciousness is drowsy  Spontaneous respirations: yes  Maintains airway independently: yes  Dentition unchanged: yes  Oropharynx: oropharynx clear of all foreign objects    QCDR Measures:  ASA# 20 - Surgical Safety Checklist: ASA20A - Safety Checks Done  PQRS# 430 - Adult PONV Prevention: 4558F - Pt received => 2 anti-emetic agents (different classes) preop & intraop  ASA# 8 - Peds PONV Prevention: NA - Not pediatric patient, not GA or 2 or more risk factors NOT present  PQRS# 424 - Damaris-op Temp Management: 4559F - At least one body temp DOCUMENTED => 35.5C or 95.9F within required timeframe  PQRS# 426 - PACU Transfer Protocol: - Transfer of care checklist used  ASA# 14 - Acute Post-op Pain: ASA14B - Patient did NOT experience pain >= 7 out of 10    I completed my SBAR handoff to the receiving nurse per policy and procedure.

## 2021-06-10 NOTE — ANESTHESIA PREPROCEDURE EVALUATION
Anesthesia Evaluation      Patient summary reviewed   No history of anesthetic complications     Airway   Mallampati: I  Neck ROM: full   Pulmonary - normal exam    breath sounds clear to auscultation  (+) a smoker                         Cardiovascular - negative ROS and normal exam  Exercise tolerance: > or = 4 METS  Rhythm: regular  Rate: normal,         Neuro/Psych    (+) anxiety/panic attacks,     Endo/Other - negative ROS      GI/Hepatic/Renal - negative ROS   (-) GERD          Dental                         Anesthesia Plan  Planned anesthetic: MAC    ASA 2     Anesthetic plan and risks discussed with: patient    Post-op plan: routine recovery

## 2021-06-16 PROBLEM — S22.49XA: Status: ACTIVE | Noted: 2020-04-28

## 2021-06-16 PROBLEM — S22.49XA MULTIPLE RIB FRACTURES INVOLVING FOUR OR MORE RIBS: Status: ACTIVE | Noted: 2020-04-25

## 2021-06-16 PROBLEM — R52 INTRACTABLE PAIN: Status: ACTIVE | Noted: 2020-04-28

## 2021-06-16 PROBLEM — F10.930 ALCOHOL WITHDRAWAL, UNCOMPLICATED (H): Status: ACTIVE | Noted: 2020-04-26

## 2021-06-18 NOTE — LETTER
Letter by Rickey Thurston PA-C at      Author: Rickey Thurston PA-C Service: -- Author Type: --    Filed:  Encounter Date: 2/5/2019 Status: (Other)       02/05/19      Evelyn TERESA Tristan  [address] [facility] [id number]    Dear Evelyn,    As a valued [facility] patient, your healthcare needs are our priority. Our clinic records indicate we have attempted to contact you to schedule an appointment but have not heard back from you after three (3) attempts. To prevent further delays in your care please contact our office to schedule your appointment as soon as possible.      Sincerely,    Kait DOMÍNGUEZ

## 2021-08-27 ENCOUNTER — APPOINTMENT (OUTPATIENT)
Dept: CT IMAGING | Facility: CLINIC | Age: 51
DRG: 896 | End: 2021-08-27
Payer: COMMERCIAL

## 2021-08-27 ENCOUNTER — HOSPITAL ENCOUNTER (INPATIENT)
Facility: CLINIC | Age: 51
LOS: 4 days | Discharge: HOME | DRG: 896 | End: 2021-08-31
Attending: EMERGENCY MEDICINE | Admitting: FAMILY MEDICINE
Payer: COMMERCIAL

## 2021-08-27 ENCOUNTER — APPOINTMENT (OUTPATIENT)
Dept: RADIOLOGY | Facility: CLINIC | Age: 51
DRG: 896 | End: 2021-08-27
Payer: COMMERCIAL
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
## 2021-08-27 DIAGNOSIS — S90.31XA CONTUSION OF RIGHT FOOT, INITIAL ENCOUNTER: ICD-10-CM

## 2021-08-27 DIAGNOSIS — F10.930 ALCOHOL WITHDRAWAL SEIZURE WITHOUT COMPLICATION (H): ICD-10-CM

## 2021-08-27 DIAGNOSIS — F41.1 ANXIETY STATE: ICD-10-CM

## 2021-08-27 DIAGNOSIS — E83.42 HYPOMAGNESEMIA: ICD-10-CM

## 2021-08-27 DIAGNOSIS — Z71.6 ENCOUNTER FOR SMOKING CESSATION COUNSELING: ICD-10-CM

## 2021-08-27 DIAGNOSIS — F10.939 ALCOHOL WITHDRAWAL SYNDROME WITH COMPLICATION (H): ICD-10-CM

## 2021-08-27 DIAGNOSIS — E87.6 HYPOKALEMIA: ICD-10-CM

## 2021-08-27 DIAGNOSIS — R56.9 ALCOHOL WITHDRAWAL SEIZURE WITHOUT COMPLICATION (H): ICD-10-CM

## 2021-08-27 DIAGNOSIS — R41.82 ALTERED MENTAL STATUS, UNSPECIFIED ALTERED MENTAL STATUS TYPE: ICD-10-CM

## 2021-08-27 LAB
ALBUMIN SERPL-MCNC: 3.9 G/DL (ref 3.5–5)
ALP SERPL-CCNC: 300 U/L (ref 45–120)
ALT SERPL W P-5'-P-CCNC: 65 U/L (ref 0–45)
ANION GAP SERPL CALCULATED.3IONS-SCNC: 14 MMOL/L (ref 5–18)
AST SERPL W P-5'-P-CCNC: 237 U/L (ref 0–40)
ATRIAL RATE - MUSE: 137 BPM
BASOPHILS # BLD AUTO: 0 10E3/UL (ref 0–0.2)
BASOPHILS NFR BLD AUTO: 0 %
BILIRUB SERPL-MCNC: 1.6 MG/DL (ref 0–1)
BUN SERPL-MCNC: 3 MG/DL (ref 8–22)
CALCIUM SERPL-MCNC: 9.6 MG/DL (ref 8.5–10.5)
CHLORIDE BLD-SCNC: 92 MMOL/L (ref 98–107)
CK SERPL-CCNC: 304 U/L (ref 30–190)
CO2 SERPL-SCNC: 31 MMOL/L (ref 22–31)
CREAT SERPL-MCNC: 0.62 MG/DL (ref 0.6–1.1)
DIASTOLIC BLOOD PRESSURE - MUSE: NORMAL MMHG
EOSINOPHIL # BLD AUTO: 0 10E3/UL (ref 0–0.7)
EOSINOPHIL NFR BLD AUTO: 0 %
ERYTHROCYTE [DISTWIDTH] IN BLOOD BY AUTOMATED COUNT: 13.8 % (ref 10–15)
ETHANOL SERPL-MCNC: <10 MG/DL
GFR SERPL CREATININE-BSD FRML MDRD: >90 ML/MIN/1.73M2
GLUCOSE BLD-MCNC: 108 MG/DL (ref 70–125)
HCG SERPL QL: NEGATIVE
HCT VFR BLD AUTO: 38.3 % (ref 35–47)
HGB BLD-MCNC: 13.4 G/DL (ref 11.7–15.7)
IMM GRANULOCYTES # BLD: 0 10E3/UL
IMM GRANULOCYTES NFR BLD: 1 %
INTERPRETATION ECG - MUSE: NORMAL
LIPASE SERPL-CCNC: 55 U/L (ref 0–52)
LYMPHOCYTES # BLD AUTO: 0.6 10E3/UL (ref 0.8–5.3)
LYMPHOCYTES NFR BLD AUTO: 9 %
MAGNESIUM SERPL-MCNC: 1.2 MG/DL (ref 1.8–2.6)
MAGNESIUM SERPL-MCNC: 2 MG/DL (ref 1.8–2.6)
MCH RBC QN AUTO: 36.5 PG (ref 26.5–33)
MCHC RBC AUTO-ENTMCNC: 35 G/DL (ref 31.5–36.5)
MCV RBC AUTO: 104 FL (ref 78–100)
MONOCYTES # BLD AUTO: 0.3 10E3/UL (ref 0–1.3)
MONOCYTES NFR BLD AUTO: 5 %
NEUTROPHILS # BLD AUTO: 5.6 10E3/UL (ref 1.6–8.3)
NEUTROPHILS NFR BLD AUTO: 85 %
NRBC # BLD AUTO: 0 10E3/UL
NRBC BLD AUTO-RTO: 0 /100
P AXIS - MUSE: 70 DEGREES
PHOSPHATE SERPL-MCNC: 1.5 MG/DL (ref 2.5–4.5)
PHOSPHATE SERPL-MCNC: 2.2 MG/DL (ref 2.5–4.5)
PHOSPHATE SERPL-MCNC: 2.3 MG/DL (ref 2.5–4.5)
PLATELET # BLD AUTO: 168 10E3/UL (ref 150–450)
POTASSIUM BLD-SCNC: 2.6 MMOL/L (ref 3.5–5)
POTASSIUM BLD-SCNC: 2.7 MMOL/L (ref 3.5–5)
POTASSIUM BLD-SCNC: 2.7 MMOL/L (ref 3.5–5)
PR INTERVAL - MUSE: 98 MS
PROT SERPL-MCNC: 7.8 G/DL (ref 6–8)
QRS DURATION - MUSE: 86 MS
QT - MUSE: 328 MS
QTC - MUSE: 495 MS
R AXIS - MUSE: 77 DEGREES
RBC # BLD AUTO: 3.67 10E6/UL (ref 3.8–5.2)
SARS-COV-2 RNA RESP QL NAA+PROBE: NEGATIVE
SODIUM SERPL-SCNC: 137 MMOL/L (ref 136–145)
SYSTOLIC BLOOD PRESSURE - MUSE: NORMAL MMHG
T AXIS - MUSE: -23 DEGREES
TROPONIN I SERPL-MCNC: 0.02 NG/ML (ref 0–0.29)
VENTRICULAR RATE- MUSE: 137 BPM
WBC # BLD AUTO: 6.6 10E3/UL (ref 4–11)

## 2021-08-27 PROCEDURE — 72125 CT NECK SPINE W/O DYE: CPT

## 2021-08-27 PROCEDURE — 96375 TX/PRO/DX INJ NEW DRUG ADDON: CPT

## 2021-08-27 PROCEDURE — 96374 THER/PROPH/DIAG INJ IV PUSH: CPT | Mod: 59

## 2021-08-27 PROCEDURE — 70450 CT HEAD/BRAIN W/O DYE: CPT

## 2021-08-27 PROCEDURE — 84703 CHORIONIC GONADOTROPIN ASSAY: CPT | Performed by: STUDENT IN AN ORGANIZED HEALTH CARE EDUCATION/TRAINING PROGRAM

## 2021-08-27 PROCEDURE — 250N000011 HC RX IP 250 OP 636: Performed by: STUDENT IN AN ORGANIZED HEALTH CARE EDUCATION/TRAINING PROGRAM

## 2021-08-27 PROCEDURE — 250N000011 HC RX IP 250 OP 636: Performed by: FAMILY MEDICINE

## 2021-08-27 PROCEDURE — 83735 ASSAY OF MAGNESIUM: CPT | Performed by: STUDENT IN AN ORGANIZED HEALTH CARE EDUCATION/TRAINING PROGRAM

## 2021-08-27 PROCEDURE — 258N000003 HC RX IP 258 OP 636: Performed by: STUDENT IN AN ORGANIZED HEALTH CARE EDUCATION/TRAINING PROGRAM

## 2021-08-27 PROCEDURE — 36415 COLL VENOUS BLD VENIPUNCTURE: CPT | Performed by: INTERNAL MEDICINE

## 2021-08-27 PROCEDURE — 96367 TX/PROPH/DG ADDL SEQ IV INF: CPT

## 2021-08-27 PROCEDURE — 99285 EMERGENCY DEPT VISIT HI MDM: CPT | Mod: 25

## 2021-08-27 PROCEDURE — 84132 ASSAY OF SERUM POTASSIUM: CPT | Performed by: INTERNAL MEDICINE

## 2021-08-27 PROCEDURE — 96365 THER/PROPH/DIAG IV INF INIT: CPT

## 2021-08-27 PROCEDURE — 96366 THER/PROPH/DIAG IV INF ADDON: CPT

## 2021-08-27 PROCEDURE — 36415 COLL VENOUS BLD VENIPUNCTURE: CPT | Performed by: STUDENT IN AN ORGANIZED HEALTH CARE EDUCATION/TRAINING PROGRAM

## 2021-08-27 PROCEDURE — 83735 ASSAY OF MAGNESIUM: CPT | Performed by: EMERGENCY MEDICINE

## 2021-08-27 PROCEDURE — C9803 HOPD COVID-19 SPEC COLLECT: HCPCS

## 2021-08-27 PROCEDURE — 210N000002 HC R&B HEART CARE

## 2021-08-27 PROCEDURE — 96361 HYDRATE IV INFUSION ADD-ON: CPT

## 2021-08-27 PROCEDURE — 250N000011 HC RX IP 250 OP 636

## 2021-08-27 PROCEDURE — 82550 ASSAY OF CK (CPK): CPT | Performed by: STUDENT IN AN ORGANIZED HEALTH CARE EDUCATION/TRAINING PROGRAM

## 2021-08-27 PROCEDURE — 85025 COMPLETE CBC W/AUTO DIFF WBC: CPT | Performed by: STUDENT IN AN ORGANIZED HEALTH CARE EDUCATION/TRAINING PROGRAM

## 2021-08-27 PROCEDURE — 250N000011 HC RX IP 250 OP 636: Performed by: EMERGENCY MEDICINE

## 2021-08-27 PROCEDURE — 36415 COLL VENOUS BLD VENIPUNCTURE: CPT | Performed by: EMERGENCY MEDICINE

## 2021-08-27 PROCEDURE — 87635 SARS-COV-2 COVID-19 AMP PRB: CPT | Performed by: EMERGENCY MEDICINE

## 2021-08-27 PROCEDURE — 99223 1ST HOSP IP/OBS HIGH 75: CPT | Performed by: FAMILY MEDICINE

## 2021-08-27 PROCEDURE — 82040 ASSAY OF SERUM ALBUMIN: CPT | Performed by: STUDENT IN AN ORGANIZED HEALTH CARE EDUCATION/TRAINING PROGRAM

## 2021-08-27 PROCEDURE — 84132 ASSAY OF SERUM POTASSIUM: CPT | Performed by: EMERGENCY MEDICINE

## 2021-08-27 PROCEDURE — 82077 ASSAY SPEC XCP UR&BREATH IA: CPT | Performed by: STUDENT IN AN ORGANIZED HEALTH CARE EDUCATION/TRAINING PROGRAM

## 2021-08-27 PROCEDURE — 84100 ASSAY OF PHOSPHORUS: CPT | Performed by: STUDENT IN AN ORGANIZED HEALTH CARE EDUCATION/TRAINING PROGRAM

## 2021-08-27 PROCEDURE — HZ2ZZZZ DETOXIFICATION SERVICES FOR SUBSTANCE ABUSE TREATMENT: ICD-10-PCS | Performed by: FAMILY MEDICINE

## 2021-08-27 PROCEDURE — 84460 ALANINE AMINO (ALT) (SGPT): CPT | Performed by: INTERNAL MEDICINE

## 2021-08-27 PROCEDURE — 96376 TX/PRO/DX INJ SAME DRUG ADON: CPT

## 2021-08-27 PROCEDURE — 250N000013 HC RX MED GY IP 250 OP 250 PS 637: Performed by: EMERGENCY MEDICINE

## 2021-08-27 PROCEDURE — 73560 X-RAY EXAM OF KNEE 1 OR 2: CPT | Mod: RT

## 2021-08-27 PROCEDURE — 96368 THER/DIAG CONCURRENT INF: CPT

## 2021-08-27 PROCEDURE — 83690 ASSAY OF LIPASE: CPT | Performed by: STUDENT IN AN ORGANIZED HEALTH CARE EDUCATION/TRAINING PROGRAM

## 2021-08-27 PROCEDURE — 73502 X-RAY EXAM HIP UNI 2-3 VIEWS: CPT

## 2021-08-27 PROCEDURE — 250N000009 HC RX 250: Performed by: FAMILY MEDICINE

## 2021-08-27 PROCEDURE — 83735 ASSAY OF MAGNESIUM: CPT | Performed by: INTERNAL MEDICINE

## 2021-08-27 PROCEDURE — 250N000013 HC RX MED GY IP 250 OP 250 PS 637: Performed by: FAMILY MEDICINE

## 2021-08-27 PROCEDURE — C9113 INJ PANTOPRAZOLE SODIUM, VIA: HCPCS | Performed by: FAMILY MEDICINE

## 2021-08-27 PROCEDURE — 36415 COLL VENOUS BLD VENIPUNCTURE: CPT | Performed by: FAMILY MEDICINE

## 2021-08-27 PROCEDURE — 93005 ELECTROCARDIOGRAM TRACING: CPT | Performed by: STUDENT IN AN ORGANIZED HEALTH CARE EDUCATION/TRAINING PROGRAM

## 2021-08-27 PROCEDURE — 73630 X-RAY EXAM OF FOOT: CPT | Mod: RT

## 2021-08-27 PROCEDURE — 71045 X-RAY EXAM CHEST 1 VIEW: CPT

## 2021-08-27 PROCEDURE — 84155 ASSAY OF PROTEIN SERUM: CPT | Performed by: INTERNAL MEDICINE

## 2021-08-27 PROCEDURE — 258N000003 HC RX IP 258 OP 636: Performed by: FAMILY MEDICINE

## 2021-08-27 PROCEDURE — 258N000003 HC RX IP 258 OP 636: Performed by: EMERGENCY MEDICINE

## 2021-08-27 PROCEDURE — 84100 ASSAY OF PHOSPHORUS: CPT | Performed by: FAMILY MEDICINE

## 2021-08-27 PROCEDURE — 250N000009 HC RX 250: Performed by: STUDENT IN AN ORGANIZED HEALTH CARE EDUCATION/TRAINING PROGRAM

## 2021-08-27 PROCEDURE — 84484 ASSAY OF TROPONIN QUANT: CPT | Performed by: STUDENT IN AN ORGANIZED HEALTH CARE EDUCATION/TRAINING PROGRAM

## 2021-08-27 PROCEDURE — 84132 ASSAY OF SERUM POTASSIUM: CPT | Performed by: FAMILY MEDICINE

## 2021-08-27 RX ORDER — ACETAMINOPHEN 325 MG/1
325 TABLET ORAL EVERY 4 HOURS PRN
Status: DISCONTINUED | OUTPATIENT
Start: 2021-08-27 | End: 2021-08-31 | Stop reason: HOSPADM

## 2021-08-27 RX ORDER — PIPERACILLIN SODIUM, TAZOBACTAM SODIUM 3; .375 G/15ML; G/15ML
3.38 INJECTION, POWDER, LYOPHILIZED, FOR SOLUTION INTRAVENOUS ONCE
Status: COMPLETED | OUTPATIENT
Start: 2021-08-27 | End: 2021-08-27

## 2021-08-27 RX ORDER — HALOPERIDOL 5 MG/ML
2.5-5 INJECTION INTRAMUSCULAR EVERY 6 HOURS PRN
Status: DISCONTINUED | OUTPATIENT
Start: 2021-08-27 | End: 2021-08-31 | Stop reason: HOSPADM

## 2021-08-27 RX ORDER — MULTIPLE VITAMINS W/ MINERALS TAB 9MG-400MCG
1 TAB ORAL DAILY
Status: DISCONTINUED | OUTPATIENT
Start: 2021-08-28 | End: 2021-08-31 | Stop reason: HOSPADM

## 2021-08-27 RX ORDER — CARBOXYMETHYLCELLULOSE SODIUM 10 MG/ML
1 GEL OPHTHALMIC 2 TIMES DAILY PRN
Status: DISCONTINUED | OUTPATIENT
Start: 2021-08-27 | End: 2021-08-31 | Stop reason: HOSPADM

## 2021-08-27 RX ORDER — LORAZEPAM 1 MG/1
1-2 TABLET ORAL EVERY 30 MIN PRN
Status: DISCONTINUED | OUTPATIENT
Start: 2021-08-27 | End: 2021-08-31 | Stop reason: HOSPADM

## 2021-08-27 RX ORDER — FOLIC ACID 1 MG/1
1 TABLET ORAL DAILY
Status: DISCONTINUED | OUTPATIENT
Start: 2021-08-28 | End: 2021-08-31 | Stop reason: HOSPADM

## 2021-08-27 RX ORDER — NICOTINE 21 MG/24HR
1 PATCH, TRANSDERMAL 24 HOURS TRANSDERMAL DAILY
Status: DISCONTINUED | OUTPATIENT
Start: 2021-08-27 | End: 2021-08-31 | Stop reason: HOSPADM

## 2021-08-27 RX ORDER — LORAZEPAM 2 MG/ML
2 INJECTION INTRAMUSCULAR
Status: DISCONTINUED | OUTPATIENT
Start: 2021-08-27 | End: 2021-08-27

## 2021-08-27 RX ORDER — ESCITALOPRAM OXALATE 10 MG/1
20 TABLET ORAL DAILY
Status: DISCONTINUED | OUTPATIENT
Start: 2021-08-28 | End: 2021-08-31 | Stop reason: HOSPADM

## 2021-08-27 RX ORDER — LIDOCAINE 40 MG/G
CREAM TOPICAL
Status: DISCONTINUED | OUTPATIENT
Start: 2021-08-27 | End: 2021-08-31 | Stop reason: HOSPADM

## 2021-08-27 RX ORDER — ONDANSETRON 4 MG/1
4 TABLET, ORALLY DISINTEGRATING ORAL EVERY 6 HOURS PRN
Status: DISCONTINUED | OUTPATIENT
Start: 2021-08-27 | End: 2021-08-28

## 2021-08-27 RX ORDER — PIPERACILLIN SODIUM, TAZOBACTAM SODIUM 3; .375 G/15ML; G/15ML
3.38 INJECTION, POWDER, LYOPHILIZED, FOR SOLUTION INTRAVENOUS EVERY 8 HOURS
Status: DISCONTINUED | OUTPATIENT
Start: 2021-08-27 | End: 2021-08-27

## 2021-08-27 RX ORDER — LEVETIRACETAM 500 MG/1
500 TABLET ORAL 2 TIMES DAILY
Status: DISCONTINUED | OUTPATIENT
Start: 2021-08-27 | End: 2021-08-28

## 2021-08-27 RX ORDER — LANOLIN ALCOHOL/MO/W.PET/CERES
100 CREAM (GRAM) TOPICAL DAILY
Status: DISCONTINUED | OUTPATIENT
Start: 2021-08-28 | End: 2021-08-31 | Stop reason: HOSPADM

## 2021-08-27 RX ORDER — NICOTINE 21 MG/24HR
1 PATCH, TRANSDERMAL 24 HOURS TRANSDERMAL DAILY
Status: DISCONTINUED | OUTPATIENT
Start: 2021-08-27 | End: 2021-08-27

## 2021-08-27 RX ORDER — MAGNESIUM SULFATE HEPTAHYDRATE 40 MG/ML
2 INJECTION, SOLUTION INTRAVENOUS ONCE
Status: COMPLETED | OUTPATIENT
Start: 2021-08-27 | End: 2021-08-27

## 2021-08-27 RX ORDER — POLYETHYLENE GLYCOL 3350 17 G/17G
17 POWDER, FOR SOLUTION ORAL DAILY PRN
Status: DISCONTINUED | OUTPATIENT
Start: 2021-08-27 | End: 2021-08-31 | Stop reason: HOSPADM

## 2021-08-27 RX ORDER — TRAZODONE HYDROCHLORIDE 50 MG/1
50 TABLET, FILM COATED ORAL
Status: DISCONTINUED | OUTPATIENT
Start: 2021-08-28 | End: 2021-08-31 | Stop reason: HOSPADM

## 2021-08-27 RX ORDER — CHLORHEXIDINE GLUCONATE ORAL RINSE 1.2 MG/ML
15 SOLUTION DENTAL 2 TIMES DAILY
Status: ACTIVE | OUTPATIENT
Start: 2021-08-27 | End: 2021-08-28

## 2021-08-27 RX ORDER — OLANZAPINE 5 MG/1
5-10 TABLET, ORALLY DISINTEGRATING ORAL EVERY 6 HOURS PRN
Status: DISCONTINUED | OUTPATIENT
Start: 2021-08-27 | End: 2021-08-31 | Stop reason: HOSPADM

## 2021-08-27 RX ORDER — LORATADINE 10 MG/1
10 TABLET ORAL DAILY PRN
Status: ON HOLD | COMMUNITY
End: 2022-01-31

## 2021-08-27 RX ORDER — LORAZEPAM 2 MG/ML
2 INJECTION INTRAMUSCULAR ONCE
Status: COMPLETED | OUTPATIENT
Start: 2021-08-27 | End: 2021-08-27

## 2021-08-27 RX ORDER — SODIUM CHLORIDE, SODIUM LACTATE, POTASSIUM CHLORIDE, CALCIUM CHLORIDE 600; 310; 30; 20 MG/100ML; MG/100ML; MG/100ML; MG/100ML
125 INJECTION, SOLUTION INTRAVENOUS CONTINUOUS
Status: DISCONTINUED | OUTPATIENT
Start: 2021-08-27 | End: 2021-08-27

## 2021-08-27 RX ORDER — CLONIDINE HYDROCHLORIDE 0.1 MG/1
0.1 TABLET ORAL EVERY 8 HOURS
Status: DISCONTINUED | OUTPATIENT
Start: 2021-08-27 | End: 2021-08-31 | Stop reason: HOSPADM

## 2021-08-27 RX ORDER — ONDANSETRON 2 MG/ML
4 INJECTION INTRAMUSCULAR; INTRAVENOUS EVERY 6 HOURS PRN
Status: DISCONTINUED | OUTPATIENT
Start: 2021-08-27 | End: 2021-08-28

## 2021-08-27 RX ORDER — FLUMAZENIL 0.1 MG/ML
0.2 INJECTION, SOLUTION INTRAVENOUS
Status: DISCONTINUED | OUTPATIENT
Start: 2021-08-27 | End: 2021-08-31 | Stop reason: HOSPADM

## 2021-08-27 RX ORDER — SODIUM CHLORIDE AND POTASSIUM CHLORIDE 150; 900 MG/100ML; MG/100ML
INJECTION, SOLUTION INTRAVENOUS CONTINUOUS
Status: DISCONTINUED | OUTPATIENT
Start: 2021-08-27 | End: 2021-08-27

## 2021-08-27 RX ORDER — LORAZEPAM 2 MG/ML
INJECTION INTRAMUSCULAR
Status: COMPLETED
Start: 2021-08-27 | End: 2021-08-27

## 2021-08-27 RX ORDER — POTASSIUM CHLORIDE 7.45 MG/ML
10 INJECTION INTRAVENOUS
Status: COMPLETED | OUTPATIENT
Start: 2021-08-27 | End: 2021-08-27

## 2021-08-27 RX ORDER — LORAZEPAM 2 MG/ML
1-2 INJECTION INTRAMUSCULAR EVERY 30 MIN PRN
Status: DISCONTINUED | OUTPATIENT
Start: 2021-08-27 | End: 2021-08-31 | Stop reason: HOSPADM

## 2021-08-27 RX ORDER — PIPERACILLIN SODIUM, TAZOBACTAM SODIUM 3; .375 G/15ML; G/15ML
3.38 INJECTION, POWDER, LYOPHILIZED, FOR SOLUTION INTRAVENOUS EVERY 8 HOURS
Status: COMPLETED | OUTPATIENT
Start: 2021-08-27 | End: 2021-08-29

## 2021-08-27 RX ORDER — ACETAMINOPHEN 650 MG/1
650 SUPPOSITORY RECTAL EVERY 6 HOURS PRN
Status: DISCONTINUED | OUTPATIENT
Start: 2021-08-27 | End: 2021-08-31 | Stop reason: HOSPADM

## 2021-08-27 RX ORDER — SODIUM CHLORIDE AND POTASSIUM CHLORIDE 150; 900 MG/100ML; MG/100ML
INJECTION, SOLUTION INTRAVENOUS CONTINUOUS
Status: DISCONTINUED | OUTPATIENT
Start: 2021-08-28 | End: 2021-08-29

## 2021-08-27 RX ADMIN — LORAZEPAM 2 MG: 2 INJECTION INTRAMUSCULAR at 06:31

## 2021-08-27 RX ADMIN — LORAZEPAM 1 MG: 1 TABLET ORAL at 22:19

## 2021-08-27 RX ADMIN — POTASSIUM CHLORIDE 10 MEQ: 7.46 INJECTION, SOLUTION INTRAVENOUS at 16:46

## 2021-08-27 RX ADMIN — SODIUM PHOSPHATE, MONOBASIC, MONOHYDRATE 15 MMOL: 276; 142 INJECTION, SOLUTION INTRAVENOUS at 08:40

## 2021-08-27 RX ADMIN — POTASSIUM CHLORIDE 10 MEQ: 7.46 INJECTION, SOLUTION INTRAVENOUS at 08:34

## 2021-08-27 RX ADMIN — POTASSIUM CHLORIDE 10 MEQ: 7.46 INJECTION, SOLUTION INTRAVENOUS at 21:18

## 2021-08-27 RX ADMIN — CHLORHEXIDINE GLUCONATE 15 ML: 1.2 SOLUTION ORAL at 20:14

## 2021-08-27 RX ADMIN — POTASSIUM CHLORIDE AND SODIUM CHLORIDE: 900; 150 INJECTION, SOLUTION INTRAVENOUS at 14:53

## 2021-08-27 RX ADMIN — METOPROLOL TARTRATE 12.5 MG: 25 TABLET, FILM COATED ORAL at 20:19

## 2021-08-27 RX ADMIN — NICOTINE 1 PATCH: 21 PATCH, EXTENDED RELEASE TRANSDERMAL at 13:25

## 2021-08-27 RX ADMIN — LORAZEPAM 2 MG: 2 INJECTION INTRAMUSCULAR; INTRAVENOUS at 13:25

## 2021-08-27 RX ADMIN — LORAZEPAM 2 MG: 2 INJECTION INTRAMUSCULAR; INTRAVENOUS at 07:38

## 2021-08-27 RX ADMIN — POTASSIUM CHLORIDE 10 MEQ: 7.46 INJECTION, SOLUTION INTRAVENOUS at 19:30

## 2021-08-27 RX ADMIN — SODIUM CHLORIDE, POTASSIUM CHLORIDE, SODIUM LACTATE AND CALCIUM CHLORIDE 125 ML/HR: 600; 310; 30; 20 INJECTION, SOLUTION INTRAVENOUS at 11:14

## 2021-08-27 RX ADMIN — POTASSIUM CHLORIDE 10 MEQ: 7.46 INJECTION, SOLUTION INTRAVENOUS at 11:58

## 2021-08-27 RX ADMIN — POTASSIUM CHLORIDE 10 MEQ: 7.46 INJECTION, SOLUTION INTRAVENOUS at 18:14

## 2021-08-27 RX ADMIN — CLONIDINE HYDROCHLORIDE 0.1 MG: 0.1 TABLET ORAL at 22:19

## 2021-08-27 RX ADMIN — FOLIC ACID: 5 INJECTION, SOLUTION INTRAMUSCULAR; INTRAVENOUS; SUBCUTANEOUS at 16:00

## 2021-08-27 RX ADMIN — POTASSIUM CHLORIDE 10 MEQ: 7.46 INJECTION, SOLUTION INTRAVENOUS at 11:28

## 2021-08-27 RX ADMIN — LORAZEPAM 1 MG: 1 TABLET ORAL at 21:09

## 2021-08-27 RX ADMIN — PIPERACILLIN AND TAZOBACTAM 3.38 G: 3; .375 INJECTION, POWDER, LYOPHILIZED, FOR SOLUTION INTRAVENOUS at 20:58

## 2021-08-27 RX ADMIN — LORAZEPAM 2 MG: 2 INJECTION INTRAMUSCULAR; INTRAVENOUS at 23:47

## 2021-08-27 RX ADMIN — LEVETIRACETAM 500 MG: 500 TABLET, FILM COATED ORAL at 20:18

## 2021-08-27 RX ADMIN — CLONIDINE HYDROCHLORIDE 0.1 MG: 0.1 TABLET ORAL at 14:51

## 2021-08-27 RX ADMIN — LORAZEPAM 2 MG: 2 INJECTION INTRAMUSCULAR; INTRAVENOUS at 11:29

## 2021-08-27 RX ADMIN — LORAZEPAM 2 MG: 2 INJECTION INTRAMUSCULAR; INTRAVENOUS at 17:13

## 2021-08-27 RX ADMIN — LORAZEPAM 2 MG: 1 TABLET ORAL at 19:30

## 2021-08-27 RX ADMIN — LORAZEPAM 2 MG: 2 INJECTION INTRAMUSCULAR; INTRAVENOUS at 18:54

## 2021-08-27 RX ADMIN — PIPERACILLIN AND TAZOBACTAM 3.38 G: 3; .375 INJECTION, POWDER, LYOPHILIZED, FOR SOLUTION INTRAVENOUS at 14:53

## 2021-08-27 RX ADMIN — LORAZEPAM 2 MG: 2 INJECTION INTRAMUSCULAR; INTRAVENOUS at 06:31

## 2021-08-27 RX ADMIN — LEVETIRACETAM 1000 MG: 100 INJECTION, SOLUTION INTRAVENOUS at 06:54

## 2021-08-27 RX ADMIN — MAGNESIUM SULFATE HEPTAHYDRATE 2 G: 40 INJECTION, SOLUTION INTRAVENOUS at 08:34

## 2021-08-27 RX ADMIN — LORAZEPAM 2 MG: 2 INJECTION INTRAMUSCULAR; INTRAVENOUS at 18:15

## 2021-08-27 RX ADMIN — METOPROLOL TARTRATE 12.5 MG: 25 TABLET, FILM COATED ORAL at 14:51

## 2021-08-27 RX ADMIN — SODIUM CHLORIDE 2000 ML: 9 INJECTION, SOLUTION INTRAVENOUS at 07:23

## 2021-08-27 RX ADMIN — POTASSIUM & SODIUM PHOSPHATES POWDER PACK 280-160-250 MG 1 PACKET: 280-160-250 PACK at 21:11

## 2021-08-27 RX ADMIN — LORAZEPAM 2 MG: 1 TABLET ORAL at 20:18

## 2021-08-27 RX ADMIN — POTASSIUM CHLORIDE 10 MEQ: 7.46 INJECTION, SOLUTION INTRAVENOUS at 13:07

## 2021-08-27 RX ADMIN — SODIUM CHLORIDE, POTASSIUM CHLORIDE, SODIUM LACTATE AND CALCIUM CHLORIDE 1000 ML: 600; 310; 30; 20 INJECTION, SOLUTION INTRAVENOUS at 08:44

## 2021-08-27 RX ADMIN — PANTOPRAZOLE SODIUM 40 MG: 40 INJECTION, POWDER, FOR SOLUTION INTRAVENOUS at 11:58

## 2021-08-27 ASSESSMENT — MIFFLIN-ST. JEOR: SCORE: 1181.07

## 2021-08-27 NOTE — H&P
[facility] MEDICINE PROGRESS NOTE      Identification/Summary: Evelyn Hudson is a 50 year old female with a past medical history of alcohol and some with treatment x2 and 5 years of sobriety but started drinking again 2 years ago who was admitted on 8/27/2021 for alcohol withdrawal seizure. Hospital course is notable for patient having a second seizure, tonic-clonic type shortly after arrival.  Patient appears to be in alcohol withdrawal.  Seizure was treated with IV lorazepam 2 mg and she was given IV levetiracetam 1000 mg.  Currently seems interactive with no significant complaints.  At bedside is her significant other Mack.    Assessment and Plan:    Alcohol withdrawal seizure.  Has been treated with IV lorazepam.  Also received IV levetiracetam.  Plan: Seizure precautions and lorazepam for withdrawal and 500 mg twice daily of levetiracetam.    Alcohol withdrawal.  Patient has not had a drink in about 48 hours.  Plan: Alcohol withdrawal order set.  Keep her n.p.o. overnight with IV fluids.      Alcoholism.  Likely needs treatment.  Plan: Social work to see     Alcoholic hepatitis with mild to moderately elevated liver function tests and mildly elevated bilirubin.  Plan: Recheck those in the morning    Low magnesium on replacement protocol    Hypokalemia on protocol    Low phosphorus on protocol    Tobacco use disorder.  Smoking a pack plus a day.  Plan: Nicotine patch    Possible aspiration pneumonitis.  Emesis x4 since yesterday and significant other reports she has been coughing more.  Chest x-ray is clear and lung exam just shows some decreased breath sounds throughout but will empirically treat her with piperacillin-tazobactam until she can be reevaluated tomorrow    Tachycardia which seems to be improving with elevated blood pressure and will give her low-dose metoprolol    Tongue lesion likely from the seizure and we will treat that with chlorhexidine    History of mood  disorder.  Plan: Continue her escitalopram    CODE STATUS: Full code    DVT prophylaxis: Low risk    Disposition: Inpatient and will be here likely at least 2 days.    Interval History/Subjective:  Patient denies any nausea.  She states she is very tired.  Denies any pain.  Denies shortness of breath.  Most of history was obtained from Mack, her significant other who is at bedside.  Patient has long history of alcoholism with treatment x2 in the past the last was 7 years ago with 5 years of sobriety.  Uncertain why she started drinking again but she tends to typically work during the day and then drink heavily at night.  She had a virtual court appointment in the for our meeting yesterday and because of that she had not had anything to drink for a number of hours and then started feeling poorly at that point and because of that she did not have anything else to drink so it has been about 48 hours and this continued to be worse and then Mack who was sleeping in another room heard a commotion in my exam and found her on the floor and unresponsive and called EMS.    Physical Exam/Objective:  Temp:  [98.3  F (36.8  C)] 98.3  F (36.8  C)  Pulse:  [114-142] 122  Resp:  [18-24] 20  BP: (129-181)/() 166/114  SpO2:  [92 %-99 %] 96 %    Body mass index is 19.37 kg/m .    GENERAL:   Mild to moderately sedated but able to interact, appears comfortable, in no acute distress, appears somewhat older than stated age   HEAD:  Normocephalic, without obvious abnormality, atraumatic   EYES:  EOM's intact   NOSE: No drainage   THROAT: Lips, mucosa, and tongue normal except for small area of trauma to the distal left tongue without any active bleeding.   BACK:    No CVA tenderness   LUNGS:   Clear to auscultation bilaterally, moderately distant   CHEST WALL:  No tenderness or deformity   HEART:  Regular rate and rhythm, S1 and S2 normal, no murmur, rub, or gallop    ABDOMEN:   Soft, non-tender, bowel sounds active all four quadrants,  no masses, no organomegaly, no rebound or guarding   EXTREMITIES: Extremities normal, atraumatic, no cyanosis or edema    SKIN: Dry to touch, no exanthems in the visualized areas   NEURO: Alert, oriented x3, moves all four extremities freely   PSYCH: Cooperative, behavior is appropriate      Medications:   Personally Reviewed.  Medications     [START ON 8/28/2021] 0.9% sodium chloride + KCl 20 mEq/L         chlorhexidine  15 mL Swish & Spit BID     cloNIDine  0.1 mg Oral Q8H     - MEDICATION INSTRUCTIONS -   Does not apply See Admin Instructions     [START ON 8/28/2021] folic acid  1 mg Oral Daily     levETIRAcetam  500 mg Oral BID     metoprolol tartrate  12.5 mg Oral BID     [START ON 8/28/2021] multivitamin w/minerals  1 tablet Oral Daily     nicotine  1 patch Transdermal Daily     nicotine   Transdermal Q8H     pantoprazole (PROTONIX) IV  40 mg Intravenous Daily with breakfast     piperacillin-tazobactam  3.375 g Intravenous Q8H     sodium chloride (PF)  3 mL Intracatheter Q8H     [START ON 8/28/2021] thiamine  100 mg Oral Daily       Data reviewed today: I personally reviewed all new medications, labs, imaging/diagnostics reports over the past 24 hours. Pertinent findings include:    Imaging:   Recent Results (from the past 24 hour(s))   Cervical spine CT w/o contrast    Narrative    EXAM: CT CERVICAL SPINE W/O CONTRAST  LOCATION: [facility]  DATE/TIME: 8/27/2021 8:12 AM    INDICATION: Trauma, pain.  COMPARISON: None.  TECHNIQUE: Routine CT Cervical Spine without IV contrast. Multiplanar reformats. Dose reduction techniques were used.    FINDINGS:    No acute displaced fractures are demonstrated. Vertebral body heights are unremarkable. The occipital condyles appear intact. 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    There is straightening of the normal cervical lordosis. There is minimal C5-C6 retrolisthesis. Facet alignment is symmetric.    No attenuation  abnormalities of the spinal canal are demonstrated to suggest epidural hematoma.    There is mild C5-C6 and C4-C5 spondylosis, characterized by intervertebral disc height loss, broad-based disc bulging, and interbody/uncovertebral spurring. There is mild spinal canal stenosis at these levels, as well as at C3-C4, where there is a   shallow disc bulge. There is mild right C3-C4, moderate-to-severe right C4-C5, and severe left C5-C6 neural foraminal stenosis.    The soft tissues are unremarkable. There is no prevertebral soft tissue swelling.    The imaged lung apices are unremarkable.      Impression    IMPRESSION:  1.  No evidence of acute displaced fracture.  2.  No evidence of traumatic subluxation.  3.  Degenerative changes.   Head CT w/o contrast    Narrative    EXAM: CT HEAD W/O CONTRAST  LOCATION: [facility]  DATE/TIME: 8/27/2021 8:12 AM    INDICATION: unwitnessed fall 2/2 alcohol withdrawal with seizures, witnessed seizure x1 while in ED  COMPARISON: None.  TECHNIQUE: Routine CT Head without IV contrast. Multiplanar reformats. Dose reduction techniques were used.    FINDINGS:  INTRACRANIAL CONTENTS: No intracranial hemorrhage, extraaxial collection, or mass effect.  No CT evidence of acute infarct. Unremarkable parenchymal attenuation. Age-appropriate ventricles and sulci.     VISUALIZED ORBITS/SINUSES/MASTOIDS: No intraorbital abnormality. No paranasal sinus mucosal disease. No middle ear or mastoid effusion.    BONES/SOFT TISSUES: No scalp hematoma. No skull fracture.      Impression    IMPRESSION:  1.  No evidence of acute hemorrhage or other acute intracranial abnormality.  2.  No evidence of acute calvarial fracture.   XR Chest 1 View    Narrative    EXAM: XR CHEST 1 VIEW  LOCATION: [facility]  DATE/TIME: 8/27/2021 8:26 AM    INDICATION: unwitnessed fall 2/2 alcohol withdrawal with seizures, witnessed seizure x1 while in ED  COMPARISON: Rib radiographs  07/24/2020       Impression    IMPRESSION: No pneumothorax. No pleural fluid. The cardiomediastinal silhouette is normal in size. Slightly displaced fractures of the posterior lateral right sixth through 10th ribs are again seen. Nonunion is possible as there is only minimal callus   formation. No new fractures identified.    XR Pelvis and Hip Right 2 Views    Narrative    EXAM: XR PELVIS AND HIP RIGHT 2 VIEWS  LOCATION: [facility]  DATE/TIME: 8/27/2021 8:29 AM    INDICATION: Unwitnessed fall, bruising right hip.  COMPARISON: None.      Impression    IMPRESSION: Normal joint spaces and alignment. No fracture.   XR Foot Right G/E 3 Views    Narrative    EXAM: XR FOOT RIGHT G/E 3 VIEWS  LOCATION: [facility]  DATE/TIME: 8/27/2021 8:31 AM    INDICATION: unwitnessed fall 2/2 alcohol withdrawal with seizures, abrasion on foot  COMPARISON: None.      Impression    IMPRESSION: Normal joint spaces and alignment. No fracture.   XR Knee Right 1/2 Views    Narrative    EXAM: XR KNEE RT 1 /2 VW  LOCATION: [facility]  DATE/TIME: 8/27/2021 8:32 AM    INDICATION: unwitnessed fall 2/2 alcohol withdrawal with seizures, abrasion on R knee  COMPARISON: 10/24/2018      Impression    IMPRESSION: Normal joint spaces and alignment. No fracture or joint effusion.       Labs:  Most Recent 3 CBC's:Recent Labs   Lab Test 08/27/21  0736 04/29/20  0600 04/26/20  0553   WBC 6.6 7.0 7.6   HGB 13.4 13.5 13.1   * 103* 101*    306 355     Most Recent 3 BMP's:Recent Labs   Lab Test 08/27/21  1555 08/27/21  1117 08/27/21  0736 04/29/20  0600 04/26/20  0553   NA  --   --  137 136 138   POTASSIUM 2.7* 2.6* 2.7* 3.8 3.4*   CHLORIDE  --   --  92* 98 99   CO2  --   --  31 32* 28   BUN  --   --  3* 5* 10   CR  --   --  0.62 0.67 0.62   ANIONGAP  --   --  14 6 11   YASMIN  --   --  9.6 9.6 8.4*   GLC  --   --  108 92 77     Most Recent 2 LFT's:Recent Labs   Lab Test  08/27/21  0736 04/26/20  0553   * 35   ALT 65* 25   ALKPHOS 300* 104   BILITOTAL 1.6* 0.5       Christiano Mccormick MD  Hospitalist  [facility] Medicine  [facility]  Phone: #[phone]

## 2021-08-27 NOTE — ED PROVIDER NOTES
EMERGENCY DEPARTMENT ENCOUNTER      NAME: Evelyn Hudson  AGE: 50 year old female  YOB: 1970  MRN: [id number]  EVALUATION DATE & TIME: 8/27/2021  5:52 AM    PCP: Maribell Betancur    ED PROVIDER: Fitz Rob D.O.      No chief complaint on file.        FINAL IMPRESSION:  1. Alcohol withdrawal syndrome with complication (H)    2. Fall, initial encounter    3. Contusion of right foot, initial encounter    4. Hypokalemia    5. Hypomagnesemia    6. Altered mental status, unspecified altered mental status type          ED COURSE & MEDICAL DECISION MAKING:    Pertinent Labs & Imaging studies reviewed. (See chart for details)  50 year old female presents to the Emergency Department for evaluation after a fall.  Patient was reportedly found down on the ground by her significant other.  She has a history of alcohol abuse but has not drank alcohol over the last 2 days.  During evaluation by the resident patient had a seizure was given 2 mg of Ativan and seizures resolved.  Patient is protecting her airway.  Patient does have tremors on evaluation.  No prior history of epilepsy.  Given that patient does have some confusion will obtain CT head cervical spine as well as chest x-ray sure the knee and foot pelvis.  Will obtain labs and reevaluate.    6:11 AM I met with the patient to gather history and to perform my initial exam. I discussed the plan for care while in the Emergency Department. PPE (protective eyewear, gloves, surgical cap, and N95 mask) was worn by me during patient encounters while patient wore mask.   8:45 AM I rechecked patient.  Continues tachycardia will continue to give Ativan.  Patient was given Keppra given her seizure immediately upon arrival, suspect likely alcohol withdrawal as cause of her symptoms.  Continue to hydrate with fluids.  Heart rate has down trended from the 140s to 1 teens to low 20s.  9:18 AM Spoke to Dr. Mccormick regarding plan for admission. Will be admitted to cardiac  tele.      At the conclusion of the encounter I discussed the results of all of the tests and the disposition. The questions were answered. The patient or family acknowledged understanding and was agreeable with the care plan.       0 minutes of critical care time     MEDICATIONS GIVEN IN THE EMERGENCY:  Medications   lactated ringers BOLUS 1,000 mL (1,000 mLs Intravenous New Bag 8/27/21 0844)     Followed by   lactated ringers infusion (has no administration in time range)   LORazepam (ATIVAN) injection 2 mg (2 mg Intravenous Given 8/27/21 0738)   potassium chloride 10 mEq in 100 mL sterile water intermittent infusion (premix) (10 mEq Intravenous New Bag 8/27/21 0834)   sodium phosphate 15 mmol in D5W intermittent infusion (15 mmol Intravenous New Bag 8/27/21 0840)   magnesium sulfate 2 g in water intermittent infusion (2 g Intravenous New Bag 8/27/21 0834)   levETIRAcetam (KEPPRA) 1,000 mg in sodium chloride 0.9 % 100 mL intermittent infusion (0 mg Intravenous Stopped 8/27/21 0737)   LORazepam (ATIVAN) injection 2 mg (2 mg Intravenous Given 8/27/21 0631)   0.9% sodium chloride BOLUS (2,000 mLs Intravenous New Bag 8/27/21 0723)       NEW PRESCRIPTIONS STARTED AT TODAY'S ER VISIT  New Prescriptions    No medications on file          =================================================================    HPI    Patient information was obtained from: Patient     Use of : N/A         Evelyn Hudson is a 50 year old female with a pertinent history of alcohol withdrawal without seizures, rib fracture from alcohol withdrawal related fall, who presents to this ED via walk-in for evaluation of fall, alcohol withdrawal symptoms.    Patient does not remember settings of fall, last remembers going to bed at 04:00 and was found on floor unconscious by  who was sleeping in another room after he heard her fall. EMS called initially and patient refused transport, brought in by spouse to ER via private car. Drinks  12 white claws a day, last drink 8/25, no history of seizures with withdrawals. Patient with unsteadiness past few weeks, mild confusion, and  noted patient to have memory issues just this morning. Not anticoagulated. Patient denied any pain to me, went into tonic clonic seizure during my questioning.      Patient is limited historian, majority of history provided by  at bedside.     Patient went into tonic-clonic seizure during initial encounter.    Patient denies any pain, or any other complaints.      REVIEW OF SYSTEMS   Review of Systems   Unable to perform ROS: Acuity of condition        PAST MEDICAL HISTORY:  Past Medical History:   Diagnosis Date     Acne      Allergic rhinitis      Anxiety        PAST SURGICAL HISTORY:  Past Surgical History:   Procedure Laterality Date     COLON SURGERY       INSERT INTRACORONARY STENT N/A 5/8/2017    Procedure: EXCISION BACK AND RIGHT ARM LIPOMA;  Surgeon: Rickey Anne MD;  Location: [facility];  Service:      IR INTERCOSTAL STEROID INJECTION SINGLE LEVEL  4/30/2020     IR INTERCOSTAL STEROID INJECTION SINGLE LEVEL  4/30/2020     RECTAL POLYPECTOMY       TUBAL LIGATION Bilateral 6/3/2014    Procedure: BILATERAL LAPAROSCOPIC TUBAL LIGATION ;  Surgeon: Lorena Jiménez MD;  Location: [facility];  Service:            CURRENT MEDICATIONS:    Current Facility-Administered Medications   Medication     lactated ringers BOLUS 1,000 mL    Followed by     lactated ringers infusion     LORazepam (ATIVAN) injection 2 mg     magnesium sulfate 2 g in water intermittent infusion     potassium chloride 10 mEq in 100 mL sterile water intermittent infusion (premix)     sodium phosphate 15 mmol in D5W intermittent infusion     Current Outpatient Medications   Medication     acetaminophen (TYLENOL) 500 MG tablet     amoxicillin (AMOXIL) 500 MG capsule     calcium carbonate-vitamin D3 (CALCIUM 600 + D,3,) 600 mg(1,500mg) -200 unit per tablet      carboxymethylcellulose (REFRESH LIQUIGEL) 1 % ophthalmic solution     cetirizine-pseudoephedrine (ZYRTEC-D) 5-120 mg per tablet     escitalopram oxalate (LEXAPRO) 20 MG tablet     fluticasone (FLONASE) 50 mcg/actuation nasal spray     folic acid (FOLVITE) 1 MG tablet     glucosamine-chondroitin 500-400 mg cap     HYDROmorphone (DILAUDID) 2 MG tablet     Lactobacillus rhamnosus GG (CULTURELLE) 10-15 Billion cell capsule     methocarbamoL (ROBAXIN) 750 MG tablet     multivitamin capsule     nicotine polacrilex (NICORETTE) 4 MG gum     omeprazole (PRILOSEC) 20 MG capsule     polyethylene glycol (MIRALAX) 17 gram packet     salsalate (DISALCID) 500 MG tablet     thiamine 100 MG tablet     traZODone (DESYREL) 50 MG tablet         ALLERGIES:  Allergies   Allergen Reactions     Ciprofloxacin Anaphylaxis     Throat swelling       FAMILY HISTORY:  No family history on file.    SOCIAL HISTORY:   Social History     Socioeconomic History     Marital status:      Spouse name: Not on file     Number of children: Not on file     Years of education: Not on file     Highest education level: Not on file   Occupational History     Not on file   Tobacco Use     Smoking status: Current Every Day Smoker     Packs/day: 1.00     Smokeless tobacco: Never Used   Substance and Sexual Activity     Alcohol use: No     Drug use: No     Sexual activity: Not on file   Other Topics Concern     Not on file   Social History Narrative     Not on file     Social Determinants of Health     Financial Resource Strain:      Difficulty of Paying Living Expenses:    Food Insecurity:      Worried About Running Out of Food in the Last Year:      Ran Out of Food in the Last Year:    Transportation Needs:      Lack of Transportation (Medical):      Lack of Transportation (Non-Medical):    Physical Activity:      Days of Exercise per Week:      Minutes of Exercise per Session:    Stress:      Feeling of Stress :    Social Connections:      Frequency of  "Communication with Friends and Family:      Frequency of Social Gatherings with Friends and Family:      Attends Jehovah's witness Services:      Active Member of Clubs or Organizations:      Attends Club or Organization Meetings:      Marital Status:    Intimate Partner Violence:      Fear of Current or Ex-Partner:      Emotionally Abused:      Physically Abused:      Sexually Abused:        VITALS:  BP (!) 175/116   Pulse (!) 142   Temp 98.3  F (36.8  C) (Oral)   Resp 24   Ht 1.676 m (5' 6\")   Wt 54.4 kg (120 lb)   SpO2 94%   BMI 19.37 kg/m      PHYSICAL EXAM    Physical Exam  Vitals and nursing note reviewed.   Constitutional:       General: She is not in acute distress.     Appearance: Normal appearance. She is not ill-appearing.   HENT:      Head: Normocephalic and atraumatic.      Right Ear: External ear normal.      Left Ear: External ear normal.      Nose: Nose normal.      Mouth/Throat:      Mouth: Mucous membranes are moist.   Eyes:      Extraocular Movements: Extraocular movements intact.      Pupils: Pupils are equal, round, and reactive to light.   Cardiovascular:      Rate and Rhythm: Regular rhythm. Tachycardia present.   Pulmonary:      Effort: Pulmonary effort is normal. No respiratory distress.      Breath sounds: Normal breath sounds. No stridor. No wheezing.   Abdominal:      General: There is no distension.      Palpations: Abdomen is soft. There is no mass.      Tenderness: There is no abdominal tenderness. There is no guarding or rebound.      Hernia: No hernia is present.   Musculoskeletal:         General: No swelling, tenderness or signs of injury. Normal range of motion.      Cervical back: Normal range of motion.   Skin:     General: Skin is warm and dry.      Capillary Refill: Capillary refill takes less than 2 seconds.   Neurological:      General: No focal deficit present.      Mental Status: She is alert and oriented to person, place, and time.      Cranial Nerves: No cranial nerve " deficit.      Motor: Tremor present. No weakness.      Coordination: Coordination normal.      Gait: Gait normal.      Comments: Posictal   Psychiatric:         Mood and Affect: Mood normal.          LAB:  All pertinent labs reviewed and interpreted.  Labs Ordered and Resulted from Time of ED Arrival Up to the Time of Departure from the ED   COMPREHENSIVE METABOLIC PANEL - Abnormal; Notable for the following components:       Result Value    Potassium 2.7 (*)     Chloride 92 (*)     Urea Nitrogen 3 (*)     Alkaline Phosphatase 300 (*)      (*)     ALT 65 (*)     Bilirubin Total 1.6 (*)     All other components within normal limits   MAGNESIUM - Abnormal; Notable for the following components:    Magnesium 1.2 (*)     All other components within normal limits   PHOSPHORUS - Abnormal; Notable for the following components:    Phosphorus 1.5 (*)     All other components within normal limits   LIPASE - Abnormal; Notable for the following components:    Lipase 55 (*)     All other components within normal limits   CK TOTAL - Abnormal; Notable for the following components:     (*)     All other components within normal limits   CBC WITH PLATELETS AND DIFFERENTIAL - Abnormal; Notable for the following components:    RBC Count 3.67 (*)      (*)     MCH 36.5 (*)     Absolute Lymphocytes 0.6 (*)     All other components within normal limits   TROPONIN I - Normal   HCG QUALITATIVE PREGNANCY - Normal   ETHYL ALCOHOL LEVEL - Normal   COVID-19 VIRUS (CORONAVIRUS) BY PCR - Normal    Narrative:     Testing was performed using the che  SARS-CoV-2 & Influenza A/B Assay on the che  [employer]  System.  This test should be ordered for the detection of SARS-COV-2 in individuals who meet SARS-CoV-2 clinical and/or epidemiological criteria. Test performance is unknown in asymptomatic patients.  This test is for in vitro diagnostic use under the FDA EUA for laboratories certified under CLIA to perform moderate and/or high  complexity testing. This test has not been FDA cleared or approved.  A negative test does not rule out the presence of PCR inhibitors in the specimen or target RNA in concentration below the limit of detection for the assay. The possibility of a false negative should be considered if the patient's recent exposure or clinical presentation suggests COVID-19.  [facility] "[employer] are certified under the Clinical Laboratory Improvement Amendments of 1988 (CLIA-88) as qualified to perform moderate and/or high complexity laboratory testing.   CBC WITH PLATELETS & DIFFERENTIAL    Narrative:     The following orders were created for panel order CBC with platelets differential.  Procedure                               Abnormality         Status                     ---------                               -----------         ------                     CBC with platelets and d...[[id number]]  Abnormal            Final result                 Please view results for these tests on the individual orders.   POTASSIUM   PERIPHERAL IV CATHETER   CALL   CALL       RADIOLOGY:  Reviewed all pertinent imaging. Please see official radiology report.  XR Knee Right 1/2 Views   Final Result   IMPRESSION: Normal joint spaces and alignment. No fracture or joint effusion.      XR Foot Right G/E 3 Views   Final Result   IMPRESSION: Normal joint spaces and alignment. No fracture.      XR Pelvis and Hip Right 2 Views   Final Result   IMPRESSION: Normal joint spaces and alignment. No fracture.      XR Chest 1 View   Final Result   IMPRESSION: No pneumothorax. No pleural fluid. The cardiomediastinal silhouette is normal in size. Slightly displaced fractures of the posterior lateral right sixth through 10th ribs are again seen. Nonunion is possible as there is only minimal callus    formation. No new fractures identified.       Head CT w/o contrast   Final Result   IMPRESSION:   1.  No evidence of acute hemorrhage or other acute  intracranial abnormality.   2.  No evidence of acute calvarial fracture.      Cervical spine CT w/o contrast   Final Result   IMPRESSION:   1.  No evidence of acute displaced fracture.   2.  No evidence of traumatic subluxation.   3.  Degenerative changes.          EKG:    Performed at: 0703    Impression:   Sinus tachycardia with short MA.  ST depression, consider subendocardial injury  Abnormal QRS_T angle, consider primary T wave abnormality  Abnormal ECG    Rate: 137  Rhythm: Sinus tachycardia  Axis: 77  MA Interval: 98  QRS Interval: 86  QTc Interval: 495  ST Changes: ST depression, consider subendocardial injury  Comparison: When compared to ECG of 4/28/2020: ST now depressed in inferior leads. St now depressed in lateral leads. T wave inversion now evident in inferior leads.    I have independently reviewed and interpreted the EKG(s) documented above.      I, Sin Carvalho, am serving as a scribe to document services personally performed by Dr. Fitz Rob based on my observation and the provider's statements to me. I, Fitz Rob, DO attest that Sin Carvalho is acting in a scribe capacity, has observed my performance of the services and has documented them in accordance with my direction.    Fitz Rob D.O.  Emergency Medicine  [facility] EMERGENCY ROOM  [address] [facility] [id number] [phone]  Dept: [phone]     Fitz Rob DO  08/27/21 0948

## 2021-08-27 NOTE — ED NOTES
Phosphorus level was run early and should have not have been run until 1815, another order will be placed to be run @1815.

## 2021-08-27 NOTE — ED TRIAGE NOTES
Pt here with complaints of an unwitnessed fall at home. Pt  reports pt was in the bedroom and he heard a loud noise went in to and pt was on the floor passed out. EMS was called pt refused transport. Pt reports having a headache yesterday, feeling dizzy and nausea. Pt reports she drinks 12 whiteclaws a day last drink was on Wednesday.

## 2021-08-27 NOTE — PHARMACY-ADMISSION MEDICATION HISTORY
Pharmacy Note - Admission Medication History    Pertinent Provider Information:    ______________________________________________________________________    Prior To Admission (PTA) med list completed and updated in EMR.       PTA Med List   Medication Sig Last Dose     carboxymethylcellulose (REFRESH LIQUIGEL) 1 % ophthalmic solution [CARBOXYMETHYLCELLULOSE (REFRESH LIQUIGEL) 1 % OPHTHALMIC SOLUTION] Apply 1 drop to eye 2 (two) times a day as needed. prn     escitalopram oxalate (LEXAPRO) 20 MG tablet [ESCITALOPRAM OXALATE (LEXAPRO) 20 MG TABLET] Take 20 mg by mouth daily. Past Month at Unknown time     glucosamine-chondroitin 500-400 mg cap Take 1 capsule by mouth daily as needed  Past Month at Unknown time     Lactobacillus rhamnosus GG (CULTURELLE) 10-15 Billion cell capsule Take 1 capsule by mouth daily as needed  Past Month at Unknown time     loratadine (CLARITIN) 10 MG tablet Take 10 mg by mouth daily as needed for allergies prn     multivitamin capsule [MULTIVITAMIN CAPSULE] Take 1 capsule by mouth at bedtime.  Past Month at Unknown time     nicotine polacrilex (NICORETTE) 4 MG gum Place 4 mg inside cheek as needed  prn     omeprazole (PRILOSEC) 20 MG capsule [OMEPRAZOLE (PRILOSEC) 20 MG CAPSULE] Take 1 capsule (20 mg total) by mouth 2 (two) times a day before meals. (Patient taking differently: Take 20 mg by mouth 2 times daily as needed ) prn     polyethylene glycol (MIRALAX) 17 gram packet [POLYETHYLENE GLYCOL (MIRALAX) 17 GRAM PACKET] Take 1 packet (17 g total) by mouth daily as needed. prn     traZODone (DESYREL) 50 MG tablet Take  mg by mouth At Bedtime  8/26/2021       Information source(s): Family member    Method of interview communication: in-person    Patient was asked about OTC/herbal products specifically.  PTA med list reflects this.    Based on the pharmacist's assessment, the PTA med list information appears reliable    Allergies were reviewed, assessed, and updated with the patient.       Patient does not use any multi-dose medications prior to admission.     Thank you for the opportunity to participate in the care of this patient.      Alma Rosa Alonzo [facility]     8/27/2021     10:11 AM

## 2021-08-28 LAB
ALBUMIN SERPL-MCNC: 3.2 G/DL (ref 3.5–5)
ALBUMIN SERPL-MCNC: 3.6 G/DL (ref 3.5–5)
ALP SERPL-CCNC: 238 U/L (ref 45–120)
ALP SERPL-CCNC: 254 U/L (ref 45–120)
ALT SERPL W P-5'-P-CCNC: 44 U/L (ref 0–45)
ALT SERPL W P-5'-P-CCNC: 49 U/L (ref 0–45)
ANION GAP SERPL CALCULATED.3IONS-SCNC: 11 MMOL/L (ref 5–18)
ANION GAP SERPL CALCULATED.3IONS-SCNC: 15 MMOL/L (ref 5–18)
AST SERPL W P-5'-P-CCNC: 130 U/L (ref 0–40)
AST SERPL W P-5'-P-CCNC: 160 U/L (ref 0–40)
BASE EXCESS BLDV CALC-SCNC: 0.8 MMOL/L
BASOPHILS # BLD AUTO: 0 10E3/UL (ref 0–0.2)
BASOPHILS NFR BLD AUTO: 0 %
BILIRUB SERPL-MCNC: 1.6 MG/DL (ref 0–1)
BILIRUB SERPL-MCNC: 1.8 MG/DL (ref 0–1)
BUN SERPL-MCNC: 2 MG/DL (ref 8–22)
BUN SERPL-MCNC: 3 MG/DL (ref 8–22)
CALCIUM SERPL-MCNC: 7.8 MG/DL (ref 8.5–10.5)
CALCIUM SERPL-MCNC: 8.2 MG/DL (ref 8.5–10.5)
CHLORIDE BLD-SCNC: 101 MMOL/L (ref 98–107)
CHLORIDE BLD-SCNC: 99 MMOL/L (ref 98–107)
CO2 SERPL-SCNC: 22 MMOL/L (ref 22–31)
CO2 SERPL-SCNC: 24 MMOL/L (ref 22–31)
CREAT SERPL-MCNC: 0.52 MG/DL (ref 0.6–1.1)
CREAT SERPL-MCNC: 0.57 MG/DL (ref 0.6–1.1)
EOSINOPHIL # BLD AUTO: 0.1 10E3/UL (ref 0–0.7)
EOSINOPHIL NFR BLD AUTO: 1 %
ERYTHROCYTE [DISTWIDTH] IN BLOOD BY AUTOMATED COUNT: 13.7 % (ref 10–15)
GFR SERPL CREATININE-BSD FRML MDRD: >90 ML/MIN/1.73M2
GFR SERPL CREATININE-BSD FRML MDRD: >90 ML/MIN/1.73M2
GLUCOSE BLD-MCNC: 75 MG/DL (ref 70–125)
GLUCOSE BLD-MCNC: 93 MG/DL (ref 70–125)
HCO3 BLDV-SCNC: 25 MMOL/L (ref 24–30)
HCT VFR BLD AUTO: 35.8 % (ref 35–47)
HGB BLD-MCNC: 12.5 G/DL (ref 11.7–15.7)
HOLD SPECIMEN: NORMAL
IMM GRANULOCYTES # BLD: 0 10E3/UL
IMM GRANULOCYTES NFR BLD: 0 %
LACTATE SERPL-SCNC: 0.7 MMOL/L (ref 0.7–2)
LYMPHOCYTES # BLD AUTO: 1.5 10E3/UL (ref 0.8–5.3)
LYMPHOCYTES NFR BLD AUTO: 23 %
MAGNESIUM SERPL-MCNC: 1.5 MG/DL (ref 1.8–2.6)
MCH RBC QN AUTO: 36 PG (ref 26.5–33)
MCHC RBC AUTO-ENTMCNC: 34.9 G/DL (ref 31.5–36.5)
MCV RBC AUTO: 103 FL (ref 78–100)
MONOCYTES # BLD AUTO: 0.4 10E3/UL (ref 0–1.3)
MONOCYTES NFR BLD AUTO: 6 %
NEUTROPHILS # BLD AUTO: 4.3 10E3/UL (ref 1.6–8.3)
NEUTROPHILS NFR BLD AUTO: 70 %
NRBC # BLD AUTO: 0 10E3/UL
NRBC BLD AUTO-RTO: 0 /100
OXYHGB MFR BLDV: 88.9 % (ref 70–75)
PCO2 BLDV: 40 MM HG (ref 35–50)
PH BLDV: 7.42 [PH] (ref 7.35–7.45)
PLATELET # BLD AUTO: 145 10E3/UL (ref 150–450)
PO2 BLDV: 64 MM HG (ref 25–47)
POTASSIUM BLD-SCNC: 2.5 MMOL/L (ref 3.5–5)
POTASSIUM BLD-SCNC: 2.5 MMOL/L (ref 3.5–5)
POTASSIUM BLD-SCNC: 2.9 MMOL/L (ref 3.5–5)
POTASSIUM BLD-SCNC: 2.9 MMOL/L (ref 3.5–5)
POTASSIUM BLD-SCNC: 3.8 MMOL/L (ref 3.5–5)
PROT SERPL-MCNC: 6.7 G/DL (ref 6–8)
PROT SERPL-MCNC: 7 G/DL (ref 6–8)
RBC # BLD AUTO: 3.47 10E6/UL (ref 3.8–5.2)
SAO2 % BLDV: 90.9 % (ref 70–75)
SODIUM SERPL-SCNC: 136 MMOL/L (ref 136–145)
SODIUM SERPL-SCNC: 136 MMOL/L (ref 136–145)
WBC # BLD AUTO: 6.3 10E3/UL (ref 4–11)

## 2021-08-28 PROCEDURE — 250N000013 HC RX MED GY IP 250 OP 250 PS 637: Performed by: FAMILY MEDICINE

## 2021-08-28 PROCEDURE — 210N000002 HC R&B HEART CARE

## 2021-08-28 PROCEDURE — 250N000011 HC RX IP 250 OP 636: Performed by: INTERNAL MEDICINE

## 2021-08-28 PROCEDURE — 99233 SBSQ HOSP IP/OBS HIGH 50: CPT | Performed by: FAMILY MEDICINE

## 2021-08-28 PROCEDURE — 83605 ASSAY OF LACTIC ACID: CPT | Performed by: FAMILY MEDICINE

## 2021-08-28 PROCEDURE — C9113 INJ PANTOPRAZOLE SODIUM, VIA: HCPCS | Performed by: FAMILY MEDICINE

## 2021-08-28 PROCEDURE — 36415 COLL VENOUS BLD VENIPUNCTURE: CPT | Performed by: FAMILY MEDICINE

## 2021-08-28 PROCEDURE — 250N000011 HC RX IP 250 OP 636: Performed by: FAMILY MEDICINE

## 2021-08-28 PROCEDURE — 250N000013 HC RX MED GY IP 250 OP 250 PS 637: Performed by: EMERGENCY MEDICINE

## 2021-08-28 PROCEDURE — 250N000009 HC RX 250: Performed by: FAMILY MEDICINE

## 2021-08-28 PROCEDURE — 82040 ASSAY OF SERUM ALBUMIN: CPT | Performed by: FAMILY MEDICINE

## 2021-08-28 PROCEDURE — 84132 ASSAY OF SERUM POTASSIUM: CPT | Performed by: FAMILY MEDICINE

## 2021-08-28 PROCEDURE — 258N000003 HC RX IP 258 OP 636: Performed by: FAMILY MEDICINE

## 2021-08-28 PROCEDURE — 82805 BLOOD GASES W/O2 SATURATION: CPT | Performed by: INTERNAL MEDICINE

## 2021-08-28 PROCEDURE — 85025 COMPLETE CBC W/AUTO DIFF WBC: CPT | Performed by: FAMILY MEDICINE

## 2021-08-28 RX ORDER — POTASSIUM CHLORIDE 7.45 MG/ML
10 INJECTION INTRAVENOUS
Status: COMPLETED | OUTPATIENT
Start: 2021-08-28 | End: 2021-08-28

## 2021-08-28 RX ORDER — POTASSIUM CHLORIDE 1500 MG/1
40 TABLET, EXTENDED RELEASE ORAL ONCE
Status: DISCONTINUED | OUTPATIENT
Start: 2021-08-28 | End: 2021-08-29

## 2021-08-28 RX ORDER — THIAMINE HYDROCHLORIDE 100 MG/ML
100 INJECTION, SOLUTION INTRAMUSCULAR; INTRAVENOUS DAILY
Status: DISCONTINUED | OUTPATIENT
Start: 2021-08-28 | End: 2021-08-29

## 2021-08-28 RX ORDER — MAGNESIUM SULFATE HEPTAHYDRATE 40 MG/ML
2 INJECTION, SOLUTION INTRAVENOUS ONCE
Status: COMPLETED | OUTPATIENT
Start: 2021-08-28 | End: 2021-08-28

## 2021-08-28 RX ORDER — FOLIC ACID 5 MG/ML
1 INJECTION, SOLUTION INTRAMUSCULAR; INTRAVENOUS; SUBCUTANEOUS DAILY
Status: DISCONTINUED | OUTPATIENT
Start: 2021-08-28 | End: 2021-08-29

## 2021-08-28 RX ORDER — CLONIDINE 0.1 MG/24H
1 PATCH, EXTENDED RELEASE TRANSDERMAL WEEKLY
Status: DISCONTINUED | OUTPATIENT
Start: 2021-08-28 | End: 2021-08-31

## 2021-08-28 RX ADMIN — CLONIDINE 1 PATCH: 0.1 PATCH, EXTENDED RELEASE TRANSDERMAL at 11:47

## 2021-08-28 RX ADMIN — MULTIPLE VITAMINS W/ MINERALS TAB 1 TABLET: TAB at 09:02

## 2021-08-28 RX ADMIN — LEVETIRACETAM 500 MG: 100 INJECTION, SOLUTION INTRAVENOUS at 21:51

## 2021-08-28 RX ADMIN — LORAZEPAM 1 MG: 1 TABLET ORAL at 09:00

## 2021-08-28 RX ADMIN — POTASSIUM CHLORIDE 10 MEQ: 7.46 INJECTION, SOLUTION INTRAVENOUS at 12:29

## 2021-08-28 RX ADMIN — FOLIC ACID 1 MG: 5 INJECTION, SOLUTION INTRAMUSCULAR; INTRAVENOUS; SUBCUTANEOUS at 12:28

## 2021-08-28 RX ADMIN — MAGNESIUM SULFATE HEPTAHYDRATE 2 G: 40 INJECTION, SOLUTION INTRAVENOUS at 02:14

## 2021-08-28 RX ADMIN — HALOPERIDOL LACTATE 5 MG: 5 INJECTION, SOLUTION INTRAMUSCULAR at 18:41

## 2021-08-28 RX ADMIN — PANTOPRAZOLE SODIUM 40 MG: 40 INJECTION, POWDER, FOR SOLUTION INTRAVENOUS at 09:03

## 2021-08-28 RX ADMIN — POTASSIUM CHLORIDE AND SODIUM CHLORIDE: 900; 150 INJECTION, SOLUTION INTRAVENOUS at 18:14

## 2021-08-28 RX ADMIN — POTASSIUM CHLORIDE AND SODIUM CHLORIDE: 900; 150 INJECTION, SOLUTION INTRAVENOUS at 10:34

## 2021-08-28 RX ADMIN — POTASSIUM CHLORIDE 10 MEQ: 7.46 INJECTION, SOLUTION INTRAVENOUS at 04:41

## 2021-08-28 RX ADMIN — PIPERACILLIN AND TAZOBACTAM 3.38 G: 3; .375 INJECTION, POWDER, LYOPHILIZED, FOR SOLUTION INTRAVENOUS at 20:33

## 2021-08-28 RX ADMIN — METOPROLOL TARTRATE 12.5 MG: 25 TABLET, FILM COATED ORAL at 09:01

## 2021-08-28 RX ADMIN — POTASSIUM CHLORIDE 10 MEQ: 7.46 INJECTION, SOLUTION INTRAVENOUS at 03:37

## 2021-08-28 RX ADMIN — PIPERACILLIN AND TAZOBACTAM 3.38 G: 3; .375 INJECTION, POWDER, LYOPHILIZED, FOR SOLUTION INTRAVENOUS at 04:57

## 2021-08-28 RX ADMIN — LORAZEPAM 1 MG: 2 INJECTION INTRAMUSCULAR; INTRAVENOUS at 04:56

## 2021-08-28 RX ADMIN — POTASSIUM CHLORIDE 10 MEQ: 7.46 INJECTION, SOLUTION INTRAVENOUS at 11:44

## 2021-08-28 RX ADMIN — THIAMINE HYDROCHLORIDE 100 MG: 100 INJECTION, SOLUTION INTRAMUSCULAR; INTRAVENOUS at 12:29

## 2021-08-28 RX ADMIN — LORAZEPAM 2 MG: 2 INJECTION INTRAMUSCULAR; INTRAVENOUS at 17:58

## 2021-08-28 RX ADMIN — POTASSIUM CHLORIDE 10 MEQ: 7.46 INJECTION, SOLUTION INTRAVENOUS at 10:32

## 2021-08-28 RX ADMIN — POTASSIUM CHLORIDE 10 MEQ: 7.46 INJECTION, SOLUTION INTRAVENOUS at 13:56

## 2021-08-28 RX ADMIN — POTASSIUM CHLORIDE 10 MEQ: 7.46 INJECTION, SOLUTION INTRAVENOUS at 00:33

## 2021-08-28 RX ADMIN — NICOTINE 1 PATCH: 21 PATCH, EXTENDED RELEASE TRANSDERMAL at 09:07

## 2021-08-28 RX ADMIN — LORAZEPAM 2 MG: 2 INJECTION INTRAMUSCULAR; INTRAVENOUS at 13:56

## 2021-08-28 RX ADMIN — LORAZEPAM 2 MG: 1 TABLET ORAL at 16:02

## 2021-08-28 RX ADMIN — SODIUM PHOSPHATE, MONOBASIC, MONOHYDRATE 9 MMOL: 276; 142 INJECTION, SOLUTION INTRAVENOUS at 18:00

## 2021-08-28 RX ADMIN — CLONIDINE HYDROCHLORIDE 0.1 MG: 0.1 TABLET ORAL at 06:40

## 2021-08-28 RX ADMIN — LORAZEPAM 2 MG: 2 INJECTION INTRAMUSCULAR; INTRAVENOUS at 00:33

## 2021-08-28 RX ADMIN — PIPERACILLIN AND TAZOBACTAM 3.38 G: 3; .375 INJECTION, POWDER, LYOPHILIZED, FOR SOLUTION INTRAVENOUS at 11:58

## 2021-08-28 RX ADMIN — LEVETIRACETAM 500 MG: 100 INJECTION, SOLUTION INTRAVENOUS at 11:46

## 2021-08-28 RX ADMIN — POTASSIUM CHLORIDE 10 MEQ: 7.46 INJECTION, SOLUTION INTRAVENOUS at 02:24

## 2021-08-28 RX ADMIN — POTASSIUM CHLORIDE AND SODIUM CHLORIDE: 900; 150 INJECTION, SOLUTION INTRAVENOUS at 02:34

## 2021-08-28 NOTE — PROGRESS NOTES
RN requested for - Precedex infusion     Seen bedside,  Family member at bed side. Patient is snoring, not restless and did not appear in distress    Being treated by primary service for alcohol withdrawal.     Plans -- There is no indication for Precedex infusion at this point. This patient will be at risk for respiratory depression If given the way she appeared now.     I would prefer additional ativan if needed.  May also consider adding neurontin if no contraindication to current regimen if she is able to swallow safely.     I added Mag and replace as needed    Add VBG to next draw. Pulse oximetry if not done yet.

## 2021-08-28 NOTE — PROVIDER NOTIFICATION
00:08 Dr. Chan notified of critical potassium level of 2.5. Running protocol. Inquired about initiating precedex gtt given CIWA scores >12 with PRN ativan [id number].     Eusebio Sosa RN on 8/28/2021 at 12:11 AM

## 2021-08-28 NOTE — CONSULTS
Care Management Initial Consult    General Information  Assessment completed with: Patient, Family, Patient  Type of CM/SW Visit: Initial Assessment    Primary Care Provider verified and updated as needed:     Readmission within the last 30 days:           Advance Care Planning:            Communication Assessment  Patient's communication style: spoken language (English or Bilingual)    Hearing Difficulty or Deaf: no   Wear Glasses or Blind: no    Cognitive  Cognitive/Neuro/Behavioral: speech  Level of Consciousness: confused  Arousal Level: arouses to voice  Orientation: disoriented to, place, time, situation  Mood/Behavior: anxious     Speech: rambling, slurred    Living Environment:   People in home: significant other  Candelario  Current living Arrangements: house      Able to return to prior arrangements: yes         Current Resources:   Patient receiving home care services: No     Community Resources: none   Equipment currently used at home: none    Employment/Financial:  Employment Status: employed part-time             Lifestyle & Psychosocial Needs:  Social Determinants of Health     Tobacco Use: High Risk     Smoking Tobacco Use: Current Every Day Smoker     Smokeless Tobacco Use: Never Used   Alcohol Use:      Frequency of Alcohol Consumption:      Average Number of Drinks:      Frequency of Binge Drinking:    Financial Resource Strain:      Difficulty of Paying Living Expenses:    Food Insecurity:      Worried About Running Out of Food in the Last Year:      Ran Out of Food in the Last Year:    Transportation Needs:      Lack of Transportation (Medical):      Lack of Transportation (Non-Medical):    Physical Activity:      Days of Exercise per Week:      Minutes of Exercise per Session:    Stress:      Feeling of Stress :    Social Connections:      Frequency of Communication with Friends and Family:      Frequency of Social Gatherings with Friends and Family:      Attends Jainism Services:      Active  Member of Clubs or Organizations:      Attends Club or Organization Meetings:      Marital Status:    Intimate Partner Violence:      Fear of Current or Ex-Partner:      Emotionally Abused:      Physically Abused:      Sexually Abused:    Depression:      PHQ-2 Score:    Housing Stability:      Unable to Pay for Housing in the Last Year:      Number of Places Lived in the Last Year:      Unstable Housing in the Last Year:        Chemical Dependency Status:      Hx of ETOH, and withdrawal seizures           Additional Information:  CM visited with Pt as requested. Pt appeared awake and is actively withdrawing. Pt was able to answer basic questions at this time. Pt states that she lives with her SO Candelario and dog and in house. 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 Pt states that she has been to INP and Outpt treatment in the past. Pt stated interest in having resources about CD. SW to see once more stable. SO is now visiting with the Pt at this time. CM to continue to follow up as needed.     Dawn Arias, RENA

## 2021-08-28 NOTE — PROGRESS NOTES
[facility] MEDICINE PROGRESS NOTE      Code Status: Full Code    Identification/Summary:   50 year old female with PMH of alcohol abuse with treatment x2 and sobriety x5 years but started drinking again 2 years ago.  8/27 admitted for alcohol withdrawal seizure. Hospital course is notable for having a second tonic-clonic seizure shortly after arrival.  Seizure was treated with IV lorazepam 2 mg and she was given IV levetiracetam 1000 mg.  Aspiration risk and failed bedside nursing swallow assessment.  All meds transition to IV/dermal methods.  Anticipate 3-4 more days.     Assessment and Plan:  Alcohol dependence with abuse  Alcohol withdrawal seizure.    8/27 treated with IV lorazepam and IV levetiracetam.  Seizure precautions and withdrawal protocols in place.  Last drink approximately 48 hours prior to admission.  8/28 transition lorazepam, Keppra, vitamins and PPIs to IV for meds.  Transition clonidine to dermal patch.  Continue oral metoprolol per protocols.  Will need social work consultation once alertness has improved.  Family updated.  Alcoholic hepatitis   At admission mild to moderately elevated liver function tests and mildly elevated bilirubin.    Recheck showing slight improvement.  Follow daily LFTs.  Hypomagnesemia  Hypokalemia  Hypophosphatemia  Utilize routine replacement protocols.  Tobacco use disorder.    Smoking a pack plus a day.  Plan: Nicotine patch  Possible aspiration pneumonitis.    Prior to admission emesis x4 and significant other reports has been coughing more.  Chest x-ray is clear and lung exam just shows some decreased breath sounds throughout   8/27 empirically started with piperacillin-tazobactam.  No fevers.  8/28 failed nursing bedside swallow assessment.  Keep NPO and meds changed to IV/dermal.  Reassess as mentation improves.   Tachycardia   Hypertension  8/27 started on low-dose metoprolol per withdrawal protocols.  8/28 blood pressure/heart rate  seems to be improving  Tongue abrasion  Likely from the seizure and treated with chlorhexidine  Anxiety  Plan: Continue her escitalopram 20 mg daily.    COVID-19 PCR negative from 8/27/2021  Noted.  Standard precautions.  Anticoagulation   Low risk.  Encourage ambulation when able.  Would not use SCDs due to falls.    Gomez:Not present  Fluids: Normal saline with 20 KCl at 125/h  Pain meds: na  Therapy: Plan on PT OT social work consultations when alertness has improved  Current Diet  Orders Placed This Encounter      NPO for Medical/Clinical Reasons Except for: Meds, Ice Chips    Supplements  None    Barriers to Discharge: Active alcohol withdrawal, aspiration risk, IV antibiotics    Disposition: Likely here at least 3-4 more days    Interval History/Subjective:  Patient relatively quiet overnight.  On nursing swallow assessment did not do well and significant coughing afterwards.  Mother is present.  Notes some intermittent coughing from the patient over the last few weeks.  Denies chest pain.  No active nausea or vomiting.  No abdominal pain.  Questions answered to verbalized satisfaction.    Physical Exam/Objective:  Temp:  [97.5  F (36.4  C)-100.2  F (37.9  C)] 98.7  F (37.1  C)  Pulse:  [] 112  Resp:  [18-24] 24  BP: (129-181)/() 129/94  SpO2:  [91 %-99 %] 91 %  Wt Readings from Last 4 Encounters:   08/27/21 54.4 kg (120 lb)   05/04/17 64 kg (141 lb)   05/27/14 57.6 kg (127 lb)   05/27/14 57.6 kg (127 lb)     Body mass index is 19.37 kg/m .    Constitutional: awake, alert, cooperative, no apparent distress, and appears older than stated age.  Some slurred speech noted.  ENT: Tongue lesion healing, Normocephalic, without obvious abnormality, atraumatic, external ears without lesions, oral pharynx with moist mucous membranes, tonsils without erythema or exudates, gums normal and good dentition.  Respiratory: No increased work of breathing, good air exchange, clear to auscultation bilaterally, no  crackles or wheezing  Cardiovascular: Normal apical impulse, regular rate and rhythm, normal S1 and S2, no S3 or S4, and no murmur noted  GI: No scars, normal bowel sounds, soft, non-distended, non-tender, no masses palpated, no hepatosplenomegally  Skin: normal skin color, texture, turgor, no redness, warmth, or swelling, and no rashes  Musculoskeletal: There is no redness, warmth, or swelling of the joints.  Motor strength is 5 out of 5 all extremities bilaterally.  Tone is normal. no lower extremity pitting edema present  Neurologic: Cranial nerves II-XII are grossly intact. Motor Exam:  Motor exam is symmetrical 5 out of 5 all extremities bilaterally  Sensory:  Sensory intact  Neuropsychiatric: General: restless and normal eye contact Level of consciousness: drowsy Affect: normal Orientation: oriented to self and place Memory and insight: normal, memory for past and recent events intact and thought process normal      Medications:   Personally Reviewed.  Medications     0.9% sodium chloride + KCl 20 mEq/L 125 mL/hr at 08/28/21 0600       chlorhexidine  15 mL Swish & Spit BID     [Held by provider] cloNIDine  0.1 mg Oral Q8H     cloNIDine   Transdermal Q8H     cloNIDine  1 patch Transdermal Weekly     - MEDICATION INSTRUCTIONS -   Does not apply See Admin Instructions     escitalopram  20 mg Oral Daily     [Held by provider] folic acid  1 mg Oral Daily     folic acid  1 mg Intravenous Daily     levETIRAcetam  500 mg Intravenous Q12H     metoprolol tartrate  12.5 mg Oral BID     [Held by provider] multivitamin w/minerals  1 tablet Oral Daily     nicotine  1 patch Transdermal Daily     nicotine   Transdermal Q8H     pantoprazole (PROTONIX) IV  40 mg Intravenous Daily with breakfast     piperacillin-tazobactam  3.375 g Intravenous Q8H     potassium & sodium phosphates  1 packet Oral TID     potassium chloride  10 mEq Intravenous Q1H     potassium chloride  40 mEq Oral Once     sodium chloride (PF)  3 mL  Intracatheter Q8H     thiamine  100 mg Intravenous Daily     [Held by provider] thiamine  100 mg Oral Daily       Data reviewed today: I personally reviewed all new medications, labs, imaging/diagnostics reports over the past 24 hours. Pertinent findings include:    Imaging:   No results found for this or any previous visit (from the past 24 hour(s)).    Labs:  XR Knee Right 1/2 Views   Final Result   IMPRESSION: Normal joint spaces and alignment. No fracture or joint effusion.      XR Foot Right G/E 3 Views   Final Result   IMPRESSION: Normal joint spaces and alignment. No fracture.      XR Pelvis and Hip Right 2 Views   Final Result   IMPRESSION: Normal joint spaces and alignment. No fracture.      XR Chest 1 View   Final Result   IMPRESSION: No pneumothorax. No pleural fluid. The cardiomediastinal silhouette is normal in size. Slightly displaced fractures of the posterior lateral right sixth through 10th ribs are again seen. Nonunion is possible as there is only minimal callus    formation. No new fractures identified.       Head CT w/o contrast   Final Result   IMPRESSION:   1.  No evidence of acute hemorrhage or other acute intracranial abnormality.   2.  No evidence of acute calvarial fracture.      Cervical spine CT w/o contrast   Final Result   IMPRESSION:   1.  No evidence of acute displaced fracture.   2.  No evidence of traumatic subluxation.   3.  Degenerative changes.        Recent Results (from the past 24 hour(s))   Potassium    Collection Time: 08/27/21 11:17 AM   Result Value Ref Range    Potassium 2.6 (LL) 3.5 - 5.0 mmol/L   Magnesium    Collection Time: 08/27/21 11:17 AM   Result Value Ref Range    Magnesium 2.0 1.8 - 2.6 mg/dL   Phosphorus    Collection Time: 08/27/21  3:55 PM   Result Value Ref Range    Phosphorus 2.3 (L) 2.5 - 4.5 mg/dL   Potassium    Collection Time: 08/27/21  3:55 PM   Result Value Ref Range    Potassium 2.7 (LL) 3.5 - 5.0 mmol/L   Phosphorus    Collection Time: 08/27/21   6:40 PM   Result Value Ref Range    Phosphorus 2.2 (L) 2.5 - 4.5 mg/dL   Potassium    Collection Time: 08/27/21 11:28 PM   Result Value Ref Range    Potassium 2.5 (LL) 3.5 - 5.0 mmol/L   Magnesium    Collection Time: 08/27/21 11:28 PM   Result Value Ref Range    Magnesium 1.5 (L) 1.8 - 2.6 mg/dL   Comprehensive metabolic panel    Collection Time: 08/27/21 11:28 PM   Result Value Ref Range    Sodium 136 136 - 145 mmol/L    Potassium 2.5 (LL) 3.5 - 5.0 mmol/L    Chloride 99 98 - 107 mmol/L    Carbon Dioxide (CO2) 22 22 - 31 mmol/L    Anion Gap 15 5 - 18 mmol/L    Urea Nitrogen 2 (L) 8 - 22 mg/dL    Creatinine 0.57 (L) 0.60 - 1.10 mg/dL    Calcium 8.2 (L) 8.5 - 10.5 mg/dL    Glucose 93 70 - 125 mg/dL    Alkaline Phosphatase 254 (H) 45 - 120 U/L     (H) 0 - 40 U/L    ALT 49 (H) 0 - 45 U/L    Protein Total 7.0 6.0 - 8.0 g/dL    Albumin 3.6 3.5 - 5.0 g/dL    Bilirubin Total 1.8 (H) 0.0 - 1.0 mg/dL    GFR Estimate >90 >60 mL/min/1.73m2   Comprehensive metabolic panel    Collection Time: 08/28/21  7:03 AM   Result Value Ref Range    Sodium 136 136 - 145 mmol/L    Potassium 2.9 (L) 3.5 - 5.0 mmol/L    Chloride 101 98 - 107 mmol/L    Carbon Dioxide (CO2) 24 22 - 31 mmol/L    Anion Gap 11 5 - 18 mmol/L    Urea Nitrogen 3 (L) 8 - 22 mg/dL    Creatinine 0.52 (L) 0.60 - 1.10 mg/dL    Calcium 7.8 (L) 8.5 - 10.5 mg/dL    Glucose 75 70 - 125 mg/dL    Alkaline Phosphatase 238 (H) 45 - 120 U/L     (H) 0 - 40 U/L    ALT 44 0 - 45 U/L    Protein Total 6.7 6.0 - 8.0 g/dL    Albumin 3.2 (L) 3.5 - 5.0 g/dL    Bilirubin Total 1.6 (H) 0.0 - 1.0 mg/dL    GFR Estimate >90 >60 mL/min/1.73m2   Potassium    Collection Time: 08/28/21  7:03 AM   Result Value Ref Range    Potassium 2.9 (L) 3.5 - 5.0 mmol/L   Blood gas venous    Collection Time: 08/28/21  7:03 AM   Result Value Ref Range    pH Venous 7.42 7.35 - 7.45    pCO2 Venous 40 35 - 50 mm Hg    pO2 Venous 64 (H) 25 - 47 mm Hg    Bicarbonate Venous 25 24 - 30 mmol/L    Base  Excess/Deficit (+/-) 0.8   mmol/L    Oxyhemoglobin Venous 88.9 (H) 70.0 - 75.0 %    O2 Sat, Venous 90.9 (H) 70.0 - 75.0 %   CBC with platelets and differential    Collection Time: 08/28/21  7:03 AM   Result Value Ref Range    WBC Count 6.3 4.0 - 11.0 10e3/uL    RBC Count 3.47 (L) 3.80 - 5.20 10e6/uL    Hemoglobin 12.5 11.7 - 15.7 g/dL    Hematocrit 35.8 35.0 - 47.0 %     (H) 78 - 100 fL    MCH 36.0 (H) 26.5 - 33.0 pg    MCHC 34.9 31.5 - 36.5 g/dL    RDW 13.7 10.0 - 15.0 %    Platelet Count 145 (L) 150 - 450 10e3/uL    % Neutrophils 70 %    % Lymphocytes 23 %    % Monocytes 6 %    % Eosinophils 1 %    % Basophils 0 %    % Immature Granulocytes 0 %    NRBCs per 100 WBC 0 <1 /100    Absolute Neutrophils 4.3 1.6 - 8.3 10e3/uL    Absolute Lymphocytes 1.5 0.8 - 5.3 10e3/uL    Absolute Monocytes 0.4 0.0 - 1.3 10e3/uL    Absolute Eosinophils 0.1 0.0 - 0.7 10e3/uL    Absolute Basophils 0.0 0.0 - 0.2 10e3/uL    Absolute Immature Granulocytes 0.0 <=0.0 10e3/uL    Absolute NRBCs 0.0 10e3/uL       Pending Labs:  Unresulted Labs Ordered in the Past 30 Days of this Admission     No orders found for last 31 day(s).          Johnson Martinez MD  [facility]  Phone: #[phone]

## 2021-08-28 NOTE — PLAN OF CARE
Problem: Physiologic Impairment (Excessive Substance Use)  Goal: Improved Physiologic Symptoms (Excessive Substance Use)  Outcome:Patient remains agitated, occasional hallucinations, cognitively wax and waning on situation. Periods of impulsivity and minimal sleep.  CIWA implace: see MAR for multiple doses of lorazepam.  Significant other at bedside assisting in reorienting.  Electrolytes replaced per protocol and per MD.  Failed bedside RN swallow on morning shift secondary to coughing. Passed RN bedside swallow on p.m. shift.  Continues to be incontinent of urine. Pure wick in place and several bedchanges through out the day.  Will continue frequent rounding.  Bed alarm on.  Door open.

## 2021-08-29 LAB
ALBUMIN SERPL-MCNC: 3 G/DL (ref 3.5–5)
ALP SERPL-CCNC: 201 U/L (ref 45–120)
ALT SERPL W P-5'-P-CCNC: 34 U/L (ref 0–45)
AST SERPL W P-5'-P-CCNC: 79 U/L (ref 0–40)
BILIRUB DIRECT SERPL-MCNC: 0.7 MG/DL
BILIRUB SERPL-MCNC: 1.5 MG/DL (ref 0–1)
MAGNESIUM SERPL-MCNC: 1.6 MG/DL (ref 1.8–2.6)
PHOSPHATE SERPL-MCNC: 2.4 MG/DL (ref 2.5–4.5)
POTASSIUM BLD-SCNC: 3.4 MMOL/L (ref 3.5–5)
POTASSIUM BLD-SCNC: 3.8 MMOL/L (ref 3.5–5)
PROT SERPL-MCNC: 6 G/DL (ref 6–8)

## 2021-08-29 PROCEDURE — C9113 INJ PANTOPRAZOLE SODIUM, VIA: HCPCS | Performed by: FAMILY MEDICINE

## 2021-08-29 PROCEDURE — 84132 ASSAY OF SERUM POTASSIUM: CPT | Performed by: FAMILY MEDICINE

## 2021-08-29 PROCEDURE — 250N000013 HC RX MED GY IP 250 OP 250 PS 637: Performed by: INTERNAL MEDICINE

## 2021-08-29 PROCEDURE — 250N000013 HC RX MED GY IP 250 OP 250 PS 637: Performed by: FAMILY MEDICINE

## 2021-08-29 PROCEDURE — 80076 HEPATIC FUNCTION PANEL: CPT | Performed by: FAMILY MEDICINE

## 2021-08-29 PROCEDURE — 250N000013 HC RX MED GY IP 250 OP 250 PS 637: Performed by: EMERGENCY MEDICINE

## 2021-08-29 PROCEDURE — 84100 ASSAY OF PHOSPHORUS: CPT | Performed by: FAMILY MEDICINE

## 2021-08-29 PROCEDURE — 36415 COLL VENOUS BLD VENIPUNCTURE: CPT | Performed by: FAMILY MEDICINE

## 2021-08-29 PROCEDURE — 120N000004 HC R&B MS OVERFLOW

## 2021-08-29 PROCEDURE — 250N000011 HC RX IP 250 OP 636: Performed by: FAMILY MEDICINE

## 2021-08-29 PROCEDURE — 99233 SBSQ HOSP IP/OBS HIGH 50: CPT | Performed by: FAMILY MEDICINE

## 2021-08-29 PROCEDURE — 83735 ASSAY OF MAGNESIUM: CPT | Performed by: INTERNAL MEDICINE

## 2021-08-29 RX ORDER — POTASSIUM CHLORIDE 1500 MG/1
20 TABLET, EXTENDED RELEASE ORAL ONCE
Status: COMPLETED | OUTPATIENT
Start: 2021-08-29 | End: 2021-08-29

## 2021-08-29 RX ORDER — LEVETIRACETAM 500 MG/1
500 TABLET ORAL 2 TIMES DAILY
Status: DISCONTINUED | OUTPATIENT
Start: 2021-08-29 | End: 2021-08-31 | Stop reason: HOSPADM

## 2021-08-29 RX ORDER — OXYCODONE HYDROCHLORIDE 5 MG/1
5 TABLET ORAL ONCE
Status: COMPLETED | OUTPATIENT
Start: 2021-08-29 | End: 2021-08-29

## 2021-08-29 RX ORDER — METOPROLOL TARTRATE 25 MG/1
25 TABLET, FILM COATED ORAL 2 TIMES DAILY
Status: DISCONTINUED | OUTPATIENT
Start: 2021-08-29 | End: 2021-08-30

## 2021-08-29 RX ORDER — HYDROCODONE BITARTRATE AND ACETAMINOPHEN 5; 325 MG/1; MG/1
1 TABLET ORAL ONCE
Status: DISCONTINUED | OUTPATIENT
Start: 2021-08-29 | End: 2021-08-29

## 2021-08-29 RX ADMIN — PIPERACILLIN AND TAZOBACTAM 3.38 G: 3; .375 INJECTION, POWDER, LYOPHILIZED, FOR SOLUTION INTRAVENOUS at 04:08

## 2021-08-29 RX ADMIN — POTASSIUM & SODIUM PHOSPHATES POWDER PACK 280-160-250 MG 1 PACKET: 280-160-250 PACK at 20:32

## 2021-08-29 RX ADMIN — METOPROLOL TARTRATE 12.5 MG: 25 TABLET, FILM COATED ORAL at 00:36

## 2021-08-29 RX ADMIN — OXYCODONE HYDROCHLORIDE 5 MG: 5 TABLET ORAL at 04:39

## 2021-08-29 RX ADMIN — POTASSIUM CHLORIDE 20 MEQ: 1500 TABLET, EXTENDED RELEASE ORAL at 11:07

## 2021-08-29 RX ADMIN — LEVETIRACETAM 500 MG: 500 TABLET, FILM COATED ORAL at 20:32

## 2021-08-29 RX ADMIN — METOPROLOL TARTRATE 25 MG: 25 TABLET, FILM COATED ORAL at 20:32

## 2021-08-29 RX ADMIN — ACETAMINOPHEN 325 MG: 325 TABLET ORAL at 21:56

## 2021-08-29 RX ADMIN — MULTIPLE VITAMINS W/ MINERALS TAB 1 TABLET: TAB at 08:04

## 2021-08-29 RX ADMIN — POTASSIUM & SODIUM PHOSPHATES POWDER PACK 280-160-250 MG 1 PACKET: 280-160-250 PACK at 13:12

## 2021-08-29 RX ADMIN — METOPROLOL TARTRATE 12.5 MG: 25 TABLET, FILM COATED ORAL at 08:04

## 2021-08-29 RX ADMIN — NICOTINE 1 PATCH: 21 PATCH, EXTENDED RELEASE TRANSDERMAL at 08:08

## 2021-08-29 RX ADMIN — ESCITALOPRAM OXALATE 20 MG: 10 TABLET ORAL at 08:03

## 2021-08-29 RX ADMIN — POTASSIUM & SODIUM PHOSPHATES POWDER PACK 280-160-250 MG 1 PACKET: 280-160-250 PACK at 08:04

## 2021-08-29 RX ADMIN — PIPERACILLIN AND TAZOBACTAM 3.38 G: 3; .375 INJECTION, POWDER, LYOPHILIZED, FOR SOLUTION INTRAVENOUS at 11:58

## 2021-08-29 RX ADMIN — PANTOPRAZOLE SODIUM 40 MG: 40 INJECTION, POWDER, FOR SOLUTION INTRAVENOUS at 08:04

## 2021-08-29 RX ADMIN — LEVETIRACETAM 500 MG: 500 TABLET, FILM COATED ORAL at 08:04

## 2021-08-29 RX ADMIN — POTASSIUM CHLORIDE AND SODIUM CHLORIDE: 900; 150 INJECTION, SOLUTION INTRAVENOUS at 02:30

## 2021-08-29 RX ADMIN — POTASSIUM CHLORIDE 20 MEQ: 1500 TABLET, EXTENDED RELEASE ORAL at 06:58

## 2021-08-29 RX ADMIN — Medication 100 MG: at 08:04

## 2021-08-29 RX ADMIN — FOLIC ACID 1 MG: 1 TABLET ORAL at 08:03

## 2021-08-29 RX ADMIN — TRAZODONE HYDROCHLORIDE 50 MG: 50 TABLET ORAL at 00:38

## 2021-08-29 RX ADMIN — TRAZODONE HYDROCHLORIDE 50 MG: 50 TABLET ORAL at 20:32

## 2021-08-29 NOTE — PROGRESS NOTES
[facility] MEDICINE PROGRESS NOTE      Code Status: Full Code       Identification/Summary:   50 year old female with PMH of alcohol abuse with treatment x2 and sobriety x5 years but started drinking again 2 years ago.  8/27 admitted for alcohol withdrawal seizure. Hospital course is notable for having a second tonic-clonic seizure shortly after arrival.  Seizure was treated with IV lorazepam 2 mg and she was given IV levetiracetam 1000 mg.  8/28 aspiration risk and failed bedside nursing swallow eval.  8/29 much more alert.  Tolerating meds and diet.  Anticipate 2-3 more days.     Assessment and Plan:  Alcohol dependence with abuse  Alcohol withdrawal seizure.    8/27 treated with IV lorazepam and IV levetiracetam.  Seizure precautions and withdrawal protocols in place.  Last drink approximately 48 hours prior to admission.  8/28 transition lorazepam, Keppra, vitamins and PPIs to IV for meds.  Transition clonidine to dermal patch.  Continue oral metoprolol per protocols.  8/29 pleased to see significant improvement.  Medications transition back to oral sources.  Continue clonidine patch.  We will plan to discuss with neurology duration of Keppra.  Social work consult appreciated. Family updated.  Alcoholic hepatitis   At admission mild to moderately elevated liver function tests and mildly elevated bilirubin.   Labs showing slight improvement.  Follow daily LFTs.  Hypomagnesemia  Hypokalemia  Hypophosphatemia  Utilize routine replacement protocols.  Tobacco use disorder.    Smoking 1 pack plus a day.  Plan: Nicotine patch  Possible aspiration pneumonitis.    Prior to admission emesis x4 and significant other reports has been coughing more.  Chest x-ray is clear and lung exam just shows some decreased breath sounds throughout   8/27 empirically started with piperacillin-tazobactam.  No fevers.  8/28 failed nursing bedside swallow assessment.  NPO and meds changed to IV/dermal.    8/29  mentation improved and did well with bedside swallow assessment.  Medications changed back to oral equivalents.  Zosyn will be stopped after today's dose.    Tachycardia   Hypertension  8/27 started on low-dose metoprolol per withdrawal protocols.  8/28 blood pressure/heart rate seems to be improving.  Clonidine patch added due to n.p.o. status.  8/29 still running hypertensive.  Increased metoprolol to 25 mg p.o. twice daily.  Monitor response.  Tongue abrasion  Likely from the seizure and treated with chlorhexidine  Anxiety  Continue her escitalopram 20 mg daily.     COVID-19 PCR negative from 8/27/2021  Noted.  Standard precautions.  Anticoagulation   Low risk.  Encourage ambulation when able.  Would not use SCDs due to falls.  Gomez:Not present  Fluids: Normal saline with 20 KCl at 125/h  Pain meds: na  Therapy: Plan on PT OT social work consultations when alertness has improved  Current Diet  Orders Placed This Encounter      Regular Diet Adult    Supplements  None    Barriers to Discharge: Alcohol withdrawal    Disposition: Potential discharge 2 to 3 days    Interval History/Subjective:  Patient much more alert and interactive today.  Did not remember meeting with me yesterday.  Mother pleased with her improvement.  No chest pain.  No shortness of breath.  No nausea or vomiting.  Passed bedside nursing assessment for swallowing.  Eating without any difficulty.  Patient verbalized agreement that she needs to undergo treatment to avoid future alcohol relapses.  Questions answered to verbalized satisfaction.    Physical Exam/Objective:  Temp:  [97.2  F (36.2  C)-98.1  F (36.7  C)] 97.9  F (36.6  C)  Pulse:  [] 91  Resp:  [16-22] 16  BP: (138-167)/() 149/99  SpO2:  [96 %-100 %] 100 %  Wt Readings from Last 4 Encounters:   08/27/21 54.4 kg (120 lb)   05/04/17 64 kg (141 lb)   05/27/14 57.6 kg (127 lb)   05/27/14 57.6 kg (127 lb)     Body mass index is 19.37 kg/m .    Constitutional: awake, alert,  cooperative, no apparent distress, and appears stated age  ENT: Normocephalic, without obvious abnormality, atraumatic, external ears without lesions, oral pharynx with moist mucous membranes, tonsils without erythema or exudates, gums normal and good dentition.  Respiratory: No increased work of breathing, good air exchange, clear to auscultation bilaterally, no crackles or wheezing  Cardiovascular: Normal apical impulse, regular rate and rhythm, normal S1 and S2, no S3 or S4, and no murmur noted  GI: No scars, normal bowel sounds, soft, non-distended, non-tender, no masses palpated, no hepatosplenomegally  Skin: normal skin color, texture, turgor, no redness, warmth, or swelling, and no rashes  Musculoskeletal: There is no redness, warmth, or swelling of the joints.  Motor strength is 5 out of 5 all extremities bilaterally.  Tone is normal. no lower extremity pitting edema present  Neurologic: Cranial nerves II-XII are grossly intact. Motor Exam:  Motor exam is symmetrical 5 out of 5 all extremities bilaterally  Sensory:  Sensory intact  Neuropsychiatric: General: fidgeting and normal eye contact Level of consciousness: alert / normal Affect: normal Orientation: oriented to self, place, time and situation Memory and insight: normal, memory for past and recent events intact and thought process normal  Mentation substantially improved compared to yesterday    Medications:   Personally Reviewed.  Medications     0.9% sodium chloride + KCl 20 mEq/L 125 mL/hr at 08/29/21 0230       [Held by provider] cloNIDine  0.1 mg Oral Q8H     cloNIDine   Transdermal Q8H     cloNIDine  1 patch Transdermal Weekly     - MEDICATION INSTRUCTIONS -   Does not apply See Admin Instructions     escitalopram  20 mg Oral Daily     folic acid  1 mg Oral Daily     [Held by provider] folic acid  1 mg Intravenous Daily     [Held by provider] levETIRAcetam  500 mg Intravenous Q12H     levETIRAcetam  500 mg Oral BID     metoprolol tartrate  12.5  mg Oral BID     multivitamin w/minerals  1 tablet Oral Daily     nicotine  1 patch Transdermal Daily     nicotine   Transdermal Q8H     pantoprazole (PROTONIX) IV  40 mg Intravenous Daily with breakfast     piperacillin-tazobactam  3.375 g Intravenous Q8H     potassium & sodium phosphates  1 packet Oral TID     sodium chloride (PF)  3 mL Intracatheter Q8H     [Held by provider] thiamine  100 mg Intravenous Daily     thiamine  100 mg Oral Daily       Data reviewed today: I personally reviewed all new medications, labs, imaging/diagnostics reports over the past 24 hours. Pertinent findings include:    Imaging:   No results found for this or any previous visit (from the past 24 hour(s)).    Labs:  XR Knee Right 1/2 Views   Final Result   IMPRESSION: Normal joint spaces and alignment. No fracture or joint effusion.      XR Foot Right G/E 3 Views   Final Result   IMPRESSION: Normal joint spaces and alignment. No fracture.      XR Pelvis and Hip Right 2 Views   Final Result   IMPRESSION: Normal joint spaces and alignment. No fracture.      XR Chest 1 View   Final Result   IMPRESSION: No pneumothorax. No pleural fluid. The cardiomediastinal silhouette is normal in size. Slightly displaced fractures of the posterior lateral right sixth through 10th ribs are again seen. Nonunion is possible as there is only minimal callus    formation. No new fractures identified.       Head CT w/o contrast   Final Result   IMPRESSION:   1.  No evidence of acute hemorrhage or other acute intracranial abnormality.   2.  No evidence of acute calvarial fracture.      Cervical spine CT w/o contrast   Final Result   IMPRESSION:   1.  No evidence of acute displaced fracture.   2.  No evidence of traumatic subluxation.   3.  Degenerative changes.        Recent Results (from the past 24 hour(s))   Potassium    Collection Time: 08/28/21  2:57 PM   Result Value Ref Range    Potassium 3.8 3.5 - 5.0 mmol/L   Extra Red Top Tube    Collection Time:  08/28/21  2:57 PM   Result Value Ref Range    Hold Specimen JIC    Phosphorus    Collection Time: 08/29/21  5:41 AM   Result Value Ref Range    Phosphorus 2.4 (L) 2.5 - 4.5 mg/dL   Magnesium    Collection Time: 08/29/21  5:41 AM   Result Value Ref Range    Magnesium 1.6 (L) 1.8 - 2.6 mg/dL   Hepatic function panel    Collection Time: 08/29/21  5:41 AM   Result Value Ref Range    Bilirubin Total 1.5 (H) 0.0 - 1.0 mg/dL    Bilirubin Direct 0.7 (H) <=0.5 mg/dL    Protein Total 6.0 6.0 - 8.0 g/dL    Albumin 3.0 (L) 3.5 - 5.0 g/dL    Alkaline Phosphatase 201 (H) 45 - 120 U/L    AST 79 (H) 0 - 40 U/L    ALT 34 0 - 45 U/L   Potassium    Collection Time: 08/29/21  5:41 AM   Result Value Ref Range    Potassium 3.4 (L) 3.5 - 5.0 mmol/L       Pending Labs:  Unresulted Labs Ordered in the Past 30 Days of this Admission     No orders found from 7/28/2021 to 8/28/2021.          Johnson Martinez MD  [facility]  Phone: #[phone]

## 2021-08-29 NOTE — PLAN OF CARE
Problem: Behavior Regulation Impairment (Excessive Substance Use)  Goal: Improved Behavioral Control (Excessive Substance Use)  Outcome: No Change     Problem: Behavior Regulation Impairment (Disruptive Behavior)  Goal: Improved Impulse and Aggression Control (Disruptive Behavior)  Outcome: Improving   Pt sleeping evening shift from 1930 to 2300 after receiving IV Haldol for agitation and hallucinations.  Pt now more alert and oriented and calm.  Pt scoring 1 on CIWA scale.  Pt denies tremors, anxiety or discomfort.  Continue to monitor.  Significant other Candelario at bedside.  Pt incontinent of large amount of urine.  Pt BP elevated.  Unable to take Metoprolol at 2100 due to sedation level.  Pt took at MN with a small bite of applesauce.  Pt c/o hunger and anxious to eat in the am.  Pt taking ice chips and no issues noted with swallowing.

## 2021-08-30 ENCOUNTER — APPOINTMENT (OUTPATIENT)
Dept: OCCUPATIONAL THERAPY | Facility: CLINIC | Age: 51
DRG: 896 | End: 2021-08-30
Attending: FAMILY MEDICINE
Payer: COMMERCIAL

## 2021-08-30 ENCOUNTER — APPOINTMENT (OUTPATIENT)
Dept: PHYSICAL THERAPY | Facility: CLINIC | Age: 51
DRG: 896 | End: 2021-08-30
Attending: FAMILY MEDICINE
Payer: COMMERCIAL

## 2021-08-30 LAB
ALBUMIN SERPL-MCNC: 3.4 G/DL (ref 3.5–5)
ALP SERPL-CCNC: 197 U/L (ref 45–120)
ALT SERPL W P-5'-P-CCNC: 35 U/L (ref 0–45)
AST SERPL W P-5'-P-CCNC: 55 U/L (ref 0–40)
BILIRUB DIRECT SERPL-MCNC: 0.5 MG/DL
BILIRUB SERPL-MCNC: 1.2 MG/DL (ref 0–1)
MAGNESIUM SERPL-MCNC: 1.3 MG/DL (ref 1.8–2.6)
PHOSPHATE SERPL-MCNC: 2.6 MG/DL (ref 2.5–4.5)
POTASSIUM BLD-SCNC: 3.5 MMOL/L (ref 3.5–5)
PROT SERPL-MCNC: 6.7 G/DL (ref 6–8)

## 2021-08-30 PROCEDURE — 83735 ASSAY OF MAGNESIUM: CPT | Performed by: FAMILY MEDICINE

## 2021-08-30 PROCEDURE — 97110 THERAPEUTIC EXERCISES: CPT | Mod: GP

## 2021-08-30 PROCEDURE — 250N000013 HC RX MED GY IP 250 OP 250 PS 637: Performed by: FAMILY MEDICINE

## 2021-08-30 PROCEDURE — 97116 GAIT TRAINING THERAPY: CPT | Mod: GP

## 2021-08-30 PROCEDURE — 84132 ASSAY OF SERUM POTASSIUM: CPT | Performed by: FAMILY MEDICINE

## 2021-08-30 PROCEDURE — 80076 HEPATIC FUNCTION PANEL: CPT | Performed by: FAMILY MEDICINE

## 2021-08-30 PROCEDURE — 97165 OT EVAL LOW COMPLEX 30 MIN: CPT | Mod: GO

## 2021-08-30 PROCEDURE — 84100 ASSAY OF PHOSPHORUS: CPT | Performed by: FAMILY MEDICINE

## 2021-08-30 PROCEDURE — 99233 SBSQ HOSP IP/OBS HIGH 50: CPT | Performed by: FAMILY MEDICINE

## 2021-08-30 PROCEDURE — C9113 INJ PANTOPRAZOLE SODIUM, VIA: HCPCS | Performed by: FAMILY MEDICINE

## 2021-08-30 PROCEDURE — 97162 PT EVAL MOD COMPLEX 30 MIN: CPT | Mod: GP

## 2021-08-30 PROCEDURE — 120N000004 HC R&B MS OVERFLOW

## 2021-08-30 PROCEDURE — 250N000013 HC RX MED GY IP 250 OP 250 PS 637: Performed by: EMERGENCY MEDICINE

## 2021-08-30 PROCEDURE — 250N000011 HC RX IP 250 OP 636: Performed by: FAMILY MEDICINE

## 2021-08-30 PROCEDURE — 36415 COLL VENOUS BLD VENIPUNCTURE: CPT | Performed by: FAMILY MEDICINE

## 2021-08-30 PROCEDURE — 97535 SELF CARE MNGMENT TRAINING: CPT | Mod: GO

## 2021-08-30 RX ORDER — METOPROLOL TARTRATE 25 MG/1
25 TABLET, FILM COATED ORAL ONCE
Status: COMPLETED | OUTPATIENT
Start: 2021-08-30 | End: 2021-08-30

## 2021-08-30 RX ORDER — METOPROLOL TARTRATE 50 MG
50 TABLET ORAL 2 TIMES DAILY
Status: DISCONTINUED | OUTPATIENT
Start: 2021-08-30 | End: 2021-08-31 | Stop reason: HOSPADM

## 2021-08-30 RX ORDER — MAGNESIUM OXIDE 400 MG/1
400 TABLET ORAL 2 TIMES DAILY
Status: DISCONTINUED | OUTPATIENT
Start: 2021-08-30 | End: 2021-08-31 | Stop reason: HOSPADM

## 2021-08-30 RX ORDER — PANTOPRAZOLE SODIUM 20 MG/1
40 TABLET, DELAYED RELEASE ORAL
Status: DISCONTINUED | OUTPATIENT
Start: 2021-08-31 | End: 2021-08-31 | Stop reason: HOSPADM

## 2021-08-30 RX ADMIN — METOPROLOL TARTRATE 50 MG: 50 TABLET, FILM COATED ORAL at 20:29

## 2021-08-30 RX ADMIN — MAGNESIUM OXIDE TAB 400 MG (241.3 MG ELEMENTAL MG) 400 MG: 400 (241.3 MG) TAB at 20:29

## 2021-08-30 RX ADMIN — FOLIC ACID 1 MG: 1 TABLET ORAL at 08:03

## 2021-08-30 RX ADMIN — LORAZEPAM 2 MG: 1 TABLET ORAL at 20:29

## 2021-08-30 RX ADMIN — LEVETIRACETAM 500 MG: 500 TABLET, FILM COATED ORAL at 08:02

## 2021-08-30 RX ADMIN — MULTIPLE VITAMINS W/ MINERALS TAB 1 TABLET: TAB at 08:03

## 2021-08-30 RX ADMIN — PANTOPRAZOLE SODIUM 40 MG: 40 INJECTION, POWDER, FOR SOLUTION INTRAVENOUS at 08:02

## 2021-08-30 RX ADMIN — Medication 100 MG: at 08:03

## 2021-08-30 RX ADMIN — METOPROLOL TARTRATE 25 MG: 25 TABLET, FILM COATED ORAL at 14:52

## 2021-08-30 RX ADMIN — LEVETIRACETAM 500 MG: 500 TABLET, FILM COATED ORAL at 20:29

## 2021-08-30 RX ADMIN — NICOTINE 1 PATCH: 21 PATCH, EXTENDED RELEASE TRANSDERMAL at 08:03

## 2021-08-30 RX ADMIN — ESCITALOPRAM OXALATE 20 MG: 10 TABLET ORAL at 08:03

## 2021-08-30 RX ADMIN — LORAZEPAM 2 MG: 1 TABLET ORAL at 11:03

## 2021-08-30 RX ADMIN — LORAZEPAM 1 MG: 1 TABLET ORAL at 11:42

## 2021-08-30 RX ADMIN — METOPROLOL TARTRATE 25 MG: 25 TABLET, FILM COATED ORAL at 08:05

## 2021-08-30 RX ADMIN — MAGNESIUM OXIDE TAB 400 MG (241.3 MG ELEMENTAL MG) 400 MG: 400 (241.3 MG) TAB at 08:03

## 2021-08-30 RX ADMIN — LORAZEPAM 2 MG: 1 TABLET ORAL at 17:30

## 2021-08-30 RX ADMIN — ACETAMINOPHEN 325 MG: 325 TABLET ORAL at 04:08

## 2021-08-30 ASSESSMENT — MIFFLIN-ST. JEOR: SCORE: 1191.5

## 2021-08-30 NOTE — PROGRESS NOTES
08/30/21 0945   Quick Adds   Type of Visit Initial Occupational Therapy Evaluation   Living Environment   People in home significant other   Current Living Arrangements house   Living Environment Comments able to live on main level   Self-Care   Usual Activity Tolerance good   Current Activity Tolerance good   Equipment Currently Used at Home grab bar, tub/shower;grab bar, toilet  (long handle shoe horn)   Activity/Exercise/Self-Care Comment indep with ADLs/IADLs and driving   Disability/Function   Wear Glasses or Blind yes   Vision Management glasses   General Information   Onset of Illness/Injury or Date of Surgery 08/27/21   Referring Physician Johnson Martinez MD   Patient/Family Therapy Goal Statement (OT) to return home   Existing Precautions/Restrictions seizures   Cognitive Status Examination   Orientation Status orientation to person, place and time   Affect/Mental Status (Cognitive) WFL   Follows Commands WFL   Range of Motion Comprehensive   General Range of Motion no range of motion deficits identified   Strength Comprehensive (MMT)   General Manual Muscle Testing (MMT) Assessment no strength deficits identified   Coordination   Upper Extremity Coordination No deficits were identified   Bed Mobility   Bed Mobility supine-sit;sit-supine;rolling right   Rolling Right [place] (Bed Mobility) independent   Supine-Sit [place] (Bed Mobility) independent   Sit-Supine [place] (Bed Mobility) independent   Transfers   Transfers bed-chair transfer;sit-stand transfer;toilet transfer   Transfer Skill: Bed to Chair/Chair to Bed   Bed-Chair [place] (Transfers) independent   Sit-Stand Transfer   Sit-Stand [place] (Transfers) independent   Toilet Transfer   Type (Toilet Transfer) sit-stand;stand-sit   [place] Level (Toilet Transfer) independent   Balance   Balance Assessment no deficits were identified   Activities of Daily Living   BADL Assessment lower body dressing   Lower Body  Dressing Assessment   [place] Level (Lower Body Dressing) independent   Position (Lower Body Dressing) supported sitting   Clinical Impression   Criteria for Skilled Therapeutic Interventions Met (OT) yes   OT Diagnosis Decreased ADL independence d/t alcohol withdrawl seizures   OT Problem List-Impairments impacting ADL activity tolerance impaired   Assessment of Occupational Performance 1-3 Performance Deficits   Identified Performance Deficits dressing, toileting, bed mob   Planned Therapy Interventions (OT) ADL retraining   Clinical Decision Making Complexity (OT) low complexity   Therapy Frequency (OT) Daily   Predicted Duration of Therapy 1 day   Risk & Benefits of therapy have been explained patient   OT Discharge Planning    OT Discharge Recommendation (DC Rec) Home with assist   OT Rationale for DC Rec Decreased ADL independence d/t alcohol withdrawl seizures. Pt's significant other is able to assist at home as needed.    Total Evaluation Time (Minutes)   Total Evaluation Time (Minutes) 5

## 2021-08-30 NOTE — PROGRESS NOTES
08/30/21 0830   Quick Adds   Type of Visit Initial PT Evaluation   Living Environment   People in home significant other   Current Living Arrangements house   Home Accessibility stairs to enter home   Number of Stairs, Main Entrance 4   Stair Railings, Main Entrance railing on left side (ascending)   Living Environment Comments Able to live on one level.   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   Disability/Function   Fall history within last six months yes   Number of times patient has fallen within last six months 2   General Information   Onset of Illness/Injury or Date of Surgery 08/27/21   Referring Physician Dr. Johnson Martinez   Patient/Family Therapy Goals Statement (PT) Get strength back, stay sober.   Pertinent History of Current Problem (include personal factors and/or comorbidities that impact the POC) Admit after fall at home and alcohol withdrawal seizure.   Existing Precautions/Restrictions fall   Pain Assessment   Patient Currently in Pain No   Strength   Strength Comments B LE WFL   Sit-Stand Transfer   Sit/Stand Transfer Comments Sit<>stand independent/supervision   Gait/Stairs (Locomotion)   [place] Level (Gait) independent;supervision;verbal cues   Assistive Device (Gait) other (see comments)  (none)   Distance in Feet (Required for LE Total Joints) 150'x1; 250'x1   Pattern (Gait) step-through   Deviations/Abnormal Patterns (Gait) liana decreased;gait speed decreased;stride length decreased   Handrail Location (Stairs) left side (ascending)   Number of Steps (Stairs) 4   Ascending Technique (Stairs) step-over-step   Descending Technique (Stairs) step-over-step   Comment (Gait/Stairs) 4 [place] home with one rail. Able to live on one level.   Clinical Impression   Criteria for  Skilled Therapeutic Intervention yes, treatment indicated   PT Diagnosis (PT) Impaired functional mobility   Influenced by the following impairments weakness, fatigue   Functional limitations due to impairments Gait, balance   Clinical Presentation Stable/Uncomplicated   Clinical Presentation Rationale Presents as medically diagnosed.   Clinical Decision Making (Complexity) low complexity   Therapy Frequency (PT) One time eval and treatment only   Predicted Duration of Therapy Intervention (days/wks) One day   Planned Therapy Interventions (PT) gait training;home exercise program;transfer training   Anticipated Equipment Needs at Discharge (PT)   (None)   Risk & Benefits of therapy have been explained evaluation/treatment results reviewed;care plan/treatment goals reviewed;participants voiced agreement with care plan;participants included;patient   PT Discharge Planning    PT Discharge Recommendation (DC Rec) home with assist   PT Rationale for DC Rec Assist from SO as needed.   Total Evaluation Time   Total Evaluation Time (Minutes) 15   Coping Strategies   Trust Relationship/Rapport care explained;questions answered

## 2021-08-30 NOTE — PLAN OF CARE
Occupational Therapy Discharge Summary    Reason for therapy discharge:    All goals and outcomes met, no further needs identified.    Progress towards therapy goal(s). See goals on Care Plan in [facility] electronic health record for goal details.  Goals met    Therapy recommendation(s):    No further therapy is recommended.

## 2021-08-30 NOTE — PROGRESS NOTES
"Scoring on CIWA; medicated (see flowsheet & MAR for details). This was somewhat effective. Updated WHS, placed on a 1:1 for safety. Setting off alarms and turning them off. Continuously pacing in the halls and feeling anxious. \"I want to leave. Can I go outside?!\" Updated patient about plan for the day. Patient is pleasant but anxious.  "

## 2021-08-30 NOTE — PLAN OF CARE
Physical Therapy Discharge Summary    Reason for therapy discharge:    All goals and outcomes met, no further needs identified.    Progress towards therapy goal(s). See goals on Care Plan in [facility] electronic health record for goal details.  Goals met    Therapy recommendation(s):    Continue home exercise program.

## 2021-08-30 NOTE — PROGRESS NOTES
[facility] MEDICINE PROGRESS NOTE      Code Status: Full Code       Identification/Summary:   50 year old female with PMH of alcohol abuse with treatment x2 and sobriety x5 years but started drinking again 2 years ago.  8/27 admitted for alcohol withdrawal seizure. Hospital course is notable for having a second tonic-clonic seizure shortly after ED arrival.  Seizure was treated with IV lorazepam 2 mg and she was given IV levetiracetam 1000 mg.  8/28 aspiration risk and failed bedside nursing swallow eval.  8/29 much more alert.  Tolerating meds and diet.  8/30 withdrawal symptoms improved but still having significant anxiety.  Psychiatry consultation pending.   EEG ordered.  If normal okay to discontinue Keppra.  Potential discharge 8/31.     Assessment and Plan:  Alcohol dependence with abuse  8/27 treated with IV lorazepam and IV levetiracetam.  Seizure precautions and withdrawal protocols in place.  Last drink approximately 48 hours prior to admission.  8/28 transition lorazepam, Keppra, vitamins and PPIs to IV for meds.  Transition clonidine to dermal patch.  Continue oral metoprolol per protocols.  8/29 pleased to see significant improvement.  Medications transition back to oral sources.  Continue clonidine patch.  8/30 had been doing well this morning but having increased anxiety this afternoon.  Placing her back on a one-to-one.  Continue the withdrawal protocol.  Social work consult appreciated.   Psychiatry consult ordered.  Alcohol withdrawal seizure.    8/27 tonic-clonic seizure witnessed in the ED.  Head CT negative.  Has been on Keppra since admission.  No prior seizure history.  8/30 discussed case with neurology.  Recommend EEG.  If normal then patient would not need further Keppra after discharge.  If abnormal should be discharged on Keppra and have follow-up with neurology as an outpatient.  Alcoholic hepatitis   At admission mild to moderately elevated liver function  tests and mildly elevated bilirubin.   Labs showing improvement.  Follow daily LFTs.  Hypomagnesemia  Hypokalemia  Hypophosphatemia  Utilize routine replacement protocols.  Tobacco use disorder.    Smoking 1 pack plus a day.  Plan: Nicotine patch  Possible aspiration pneumonitis.    Prior to admission emesis x4 and significant other reports has been coughing more.  Chest x-ray is clear and lung exam just shows some decreased breath sounds throughout   8/27 empirically started with piperacillin-tazobactam.  No fevers.  8/28 failed nursing bedside swallow assessment.  NPO and meds changed to IV/dermal.    8/29 mentation improved and did well with bedside swallow assessment.  Medications changed back to oral equivalents.  Zosyn stopped.    Tachycardia   Hypertension  8/27 started on low-dose metoprolol per withdrawal protocols.  8/28 blood pressure/heart rate seems to be improving.  Clonidine patch added due to n.p.o. status.  8/29 still running hypertensive.  Increased metoprolol to 25 mg p.o. twice daily.    8/30 still tachycardic and hypertension.  Increase metoprolol to 50 mg twice daily.  Tongue abrasion  Associated with seizure and treated with chlorhexidine  Anxiety  Continue her escitalopram 20 mg daily.  8/30 psychiatry service consulted due to increased anxiety.     COVID-19 PCR negative from 8/27/2021  Noted.  Standard precautions.  Anticoagulation   Low risk.  Encourage ambulation when able.  Would not use SCDs due to falls.  Gomez:Not present  Fluids: Saline lock  Pain meds: na  Therapy: na  Gomez:Not present  Current Diet  Orders Placed This Encounter      Regular Diet Adult    Supplements  None    Barriers to Discharge: Increased anxiety, alcohol withdrawal, psychiatry evaluation, EEG    Disposition: Potential discharge 8/31 if negative EEG and symptoms improved    Interval History/Subjective:  Patient initially was doing well this morning but having increased anxiety during the late morning.  Pacing.   Not using call light appropriately.  Denies chest pain, shortness of breath nausea or vomiting.  Was very much hoping to be discharged home today.  After discussion verbalized understanding of needing EEG accomplished before she leaves.  Did repeat frequently that she is agreeable to alcohol sensation and her boyfriend is sober.  Questions answered to verbalized satisfaction.    Physical Exam/Objective:  Temp:  [97.2  F (36.2  C)-98  F (36.7  C)] 97.2  F (36.2  C)  Pulse:  [] 98  Resp:  [16-22] 22  BP: (141-174)/() 174/107  SpO2:  [95 %-100 %] 99 %  Wt Readings from Last 4 Encounters:   08/30/21 55.5 kg (122 lb 4.8 oz)   05/04/17 64 kg (141 lb)   05/27/14 57.6 kg (127 lb)   05/27/14 57.6 kg (127 lb)     Body mass index is 19.74 kg/m .    Constitutional: awake, alert, cooperative, no apparent distress, and appears stated age and mild agitation and nervousness but pleasant and cooperative.  ENT: Normocephalic, without obvious abnormality, atraumatic, external ears without lesions, oral pharynx with moist mucous membranes, tonsils without erythema or exudates, gums normal and good dentition.  Respiratory: No increased work of breathing, good air exchange, clear to auscultation bilaterally, no crackles or wheezing  Cardiovascular: Normal apical impulse, regular rate and rhythm, normal S1 and S2, no S3 or S4, and no murmur noted and tachycardic  GI: No scars, normal bowel sounds, soft, non-distended, non-tender, no masses palpated, no hepatosplenomegally  Skin: normal skin color, texture, turgor, no redness, warmth, or swelling, and no rashes  Musculoskeletal: There is no redness, warmth, or swelling of the joints.  Motor strength is 5 out of 5 all extremities bilaterally.  Tone is normal. no lower extremity pitting edema present  Neurologic: Cranial nerves II-XII are grossly intact. Motor Exam:  Motor exam is symmetrical 5 out of 5 all extremities bilaterally  Sensory:  Sensory intact  Neuropsychiatric:  General: restless and normal eye contact Level of consciousness: alert / normal Affect: anxious Orientation: oriented to self, place, time and situation Memory and insight: normal, memory for past and recent events intact and thought process normal      Medications:   Personally Reviewed.  Medications       [Held by provider] cloNIDine  0.1 mg Oral Q8H     cloNIDine   Transdermal Q8H     cloNIDine  1 patch Transdermal Weekly     - MEDICATION INSTRUCTIONS -   Does not apply See Admin Instructions     escitalopram  20 mg Oral Daily     folic acid  1 mg Oral Daily     levETIRAcetam  500 mg Oral BID     magnesium oxide  400 mg Oral BID     metoprolol tartrate  25 mg Oral BID     multivitamin w/minerals  1 tablet Oral Daily     nicotine  1 patch Transdermal Daily     nicotine   Transdermal Q8H     pantoprazole (PROTONIX) IV  40 mg Intravenous Daily with breakfast     sodium chloride (PF)  3 mL Intracatheter Q8H     thiamine  100 mg Oral Daily       Data reviewed today: I personally reviewed all new medications, labs, imaging/diagnostics reports over the past 24 hours. Pertinent findings include:    Imaging:   No results found for this or any previous visit (from the past 24 hour(s)).    Labs:  XR Knee Right 1/2 Views   Final Result   IMPRESSION: Normal joint spaces and alignment. No fracture or joint effusion.      XR Foot Right G/E 3 Views   Final Result   IMPRESSION: Normal joint spaces and alignment. No fracture.      XR Pelvis and Hip Right 2 Views   Final Result   IMPRESSION: Normal joint spaces and alignment. No fracture.      XR Chest 1 View   Final Result   IMPRESSION: No pneumothorax. No pleural fluid. The cardiomediastinal silhouette is normal in size. Slightly displaced fractures of the posterior lateral right sixth through 10th ribs are again seen. Nonunion is possible as there is only minimal callus    formation. No new fractures identified.       Head CT w/o contrast   Final Result   IMPRESSION:   1.  No  evidence of acute hemorrhage or other acute intracranial abnormality.   2.  No evidence of acute calvarial fracture.      Cervical spine CT w/o contrast   Final Result   IMPRESSION:   1.  No evidence of acute displaced fracture.   2.  No evidence of traumatic subluxation.   3.  Degenerative changes.        Recent Results (from the past 24 hour(s))   Potassium    Collection Time: 08/29/21  3:01 PM   Result Value Ref Range    Potassium 3.8 3.5 - 5.0 mmol/L   Hepatic function panel    Collection Time: 08/30/21  4:55 AM   Result Value Ref Range    Bilirubin Total 1.2 (H) 0.0 - 1.0 mg/dL    Bilirubin Direct 0.5 <=0.5 mg/dL    Protein Total 6.7 6.0 - 8.0 g/dL    Albumin 3.4 (L) 3.5 - 5.0 g/dL    Alkaline Phosphatase 197 (H) 45 - 120 U/L    AST 55 (H) 0 - 40 U/L    ALT 35 0 - 45 U/L   Magnesium    Collection Time: 08/30/21  4:55 AM   Result Value Ref Range    Magnesium 1.3 (L) 1.8 - 2.6 mg/dL   Potassium    Collection Time: 08/30/21  4:55 AM   Result Value Ref Range    Potassium 3.5 3.5 - 5.0 mmol/L   Phosphorus    Collection Time: 08/30/21  4:55 AM   Result Value Ref Range    Phosphorus 2.6 2.5 - 4.5 mg/dL       Pending Labs:  Unresulted Labs Ordered in the Past 30 Days of this Admission     No orders found from 7/28/2021 to 8/28/2021.          Johnson Martinez MD  [facility]  Phone: #[phone]

## 2021-08-31 ENCOUNTER — HOSPITAL ENCOUNTER (INPATIENT)
Dept: NEUROLOGY | Facility: CLINIC | Age: 51
DRG: 896 | End: 2021-08-31
Attending: FAMILY MEDICINE
Payer: COMMERCIAL

## 2021-08-31 VITALS
WEIGHT: 121.7 LBS | HEART RATE: 80 BPM | SYSTOLIC BLOOD PRESSURE: 170 MMHG | BODY MASS INDEX: 19.56 KG/M2 | RESPIRATION RATE: 18 BRPM | TEMPERATURE: 97.4 F | OXYGEN SATURATION: 100 % | HEIGHT: 66 IN | DIASTOLIC BLOOD PRESSURE: 103 MMHG

## 2021-08-31 LAB
ALBUMIN SERPL-MCNC: 3.3 G/DL (ref 3.5–5)
ALP SERPL-CCNC: 190 U/L (ref 45–120)
ALT SERPL W P-5'-P-CCNC: 30 U/L (ref 0–45)
AST SERPL W P-5'-P-CCNC: 39 U/L (ref 0–40)
BILIRUB DIRECT SERPL-MCNC: 0.4 MG/DL
BILIRUB SERPL-MCNC: 0.9 MG/DL (ref 0–1)
PHOSPHATE SERPL-MCNC: 4.5 MG/DL (ref 2.5–4.5)
POTASSIUM BLD-SCNC: 3.4 MMOL/L (ref 3.5–5)
PROT SERPL-MCNC: 6.7 G/DL (ref 6–8)

## 2021-08-31 PROCEDURE — 80076 HEPATIC FUNCTION PANEL: CPT | Performed by: FAMILY MEDICINE

## 2021-08-31 PROCEDURE — 95816 EEG AWAKE AND DROWSY: CPT | Mod: 26 | Performed by: PSYCHIATRY & NEUROLOGY

## 2021-08-31 PROCEDURE — 250N000013 HC RX MED GY IP 250 OP 250 PS 637: Performed by: FAMILY MEDICINE

## 2021-08-31 PROCEDURE — 84100 ASSAY OF PHOSPHORUS: CPT | Performed by: FAMILY MEDICINE

## 2021-08-31 PROCEDURE — 82040 ASSAY OF SERUM ALBUMIN: CPT | Performed by: FAMILY MEDICINE

## 2021-08-31 PROCEDURE — 84132 ASSAY OF SERUM POTASSIUM: CPT | Performed by: FAMILY MEDICINE

## 2021-08-31 PROCEDURE — 36415 COLL VENOUS BLD VENIPUNCTURE: CPT | Performed by: FAMILY MEDICINE

## 2021-08-31 PROCEDURE — 95819 EEG AWAKE AND ASLEEP: CPT

## 2021-08-31 PROCEDURE — 99222 1ST HOSP IP/OBS MODERATE 55: CPT | Performed by: NURSE PRACTITIONER

## 2021-08-31 PROCEDURE — 99239 HOSP IP/OBS DSCHRG MGMT >30: CPT | Performed by: FAMILY MEDICINE

## 2021-08-31 PROCEDURE — 250N000013 HC RX MED GY IP 250 OP 250 PS 637: Performed by: EMERGENCY MEDICINE

## 2021-08-31 RX ORDER — CLONIDINE 0.1 MG/24H
1 PATCH, EXTENDED RELEASE TRANSDERMAL WEEKLY
Status: DISCONTINUED | OUTPATIENT
Start: 2021-08-31 | End: 2021-08-31 | Stop reason: HOSPADM

## 2021-08-31 RX ORDER — NALTREXONE HYDROCHLORIDE 50 MG/1
50 TABLET, FILM COATED ORAL AT BEDTIME
Qty: 30 TABLET | Refills: 0 | Status: ON HOLD
Start: 2021-08-31 | End: 2022-01-17

## 2021-08-31 RX ORDER — NICOTINE 21 MG/24HR
1 PATCH, TRANSDERMAL 24 HOURS TRANSDERMAL DAILY
Qty: 28 PATCH | Refills: 0 | Status: ON HOLD | OUTPATIENT
Start: 2021-09-01 | End: 2022-01-17

## 2021-08-31 RX ORDER — FOLIC ACID 1 MG/1
1 TABLET ORAL DAILY
Qty: 30 TABLET | Refills: 0 | Status: SHIPPED | OUTPATIENT
Start: 2021-09-01

## 2021-08-31 RX ORDER — HYDROXYZINE HYDROCHLORIDE 25 MG/1
25 TABLET, FILM COATED ORAL 3 TIMES DAILY PRN
Qty: 60 TABLET | Refills: 0 | Status: SHIPPED | OUTPATIENT
Start: 2021-08-31

## 2021-08-31 RX ADMIN — ESCITALOPRAM OXALATE 20 MG: 10 TABLET ORAL at 09:12

## 2021-08-31 RX ADMIN — NICOTINE 1 PATCH: 21 PATCH, EXTENDED RELEASE TRANSDERMAL at 09:16

## 2021-08-31 RX ADMIN — MAGNESIUM OXIDE TAB 400 MG (241.3 MG ELEMENTAL MG) 400 MG: 400 (241.3 MG) TAB at 09:15

## 2021-08-31 RX ADMIN — CLONIDINE 1 PATCH: 0.1 PATCH, EXTENDED RELEASE TRANSDERMAL at 12:04

## 2021-08-31 RX ADMIN — METOPROLOL TARTRATE 50 MG: 50 TABLET, FILM COATED ORAL at 09:11

## 2021-08-31 RX ADMIN — PANTOPRAZOLE SODIUM 40 MG: 20 TABLET, DELAYED RELEASE ORAL at 06:48

## 2021-08-31 RX ADMIN — MULTIPLE VITAMINS W/ MINERALS TAB 1 TABLET: TAB at 09:10

## 2021-08-31 RX ADMIN — LEVETIRACETAM 500 MG: 500 TABLET, FILM COATED ORAL at 09:12

## 2021-08-31 RX ADMIN — FOLIC ACID 1 MG: 1 TABLET ORAL at 09:12

## 2021-08-31 RX ADMIN — Medication 100 MG: at 09:11

## 2021-08-31 ASSESSMENT — MIFFLIN-ST. JEOR: SCORE: 1188.78

## 2021-08-31 NOTE — DISCHARGE SUMMARY
[facility]  Hospitalist Discharge Summary      Date of Admission:  8/27/2021  Date of Discharge:  8/31/2021  5:10 PM  Discharging Provider: Laz Carl MD      Discharge Diagnoses   Alcohol withdrawal seizure.    Follow-ups Needed After Discharge   Follow-up Appointments     Follow-up and recommended labs and tests       Follow up with primary care provider, Maribell Betancur, within 7 days for   hospital follow- up.  No follow up labs or test are needed.             Unresulted Labs Ordered in the Past 30 Days of this Admission     No orders found from 7/28/2021 to 8/28/2021.      These results will be followed up by N/A.    Discharge Disposition   Discharged to home  Condition at discharge: Stable      Hospital Course   50 year old female with PMH of alcohol abuse with treatment x2 and sobriety x5 years but started drinking again 2 years ago.  8/27 admitted for alcohol withdrawal seizure. Hospital course is notable for having a second tonic-clonic seizure shortly after ED arrival.  Seizure was treated with IV lorazepam 2 mg and she was given IV levetiracetam 1000 mg.  8/28 aspiration risk and failed bedside nursing swallow eval.  8/29 much more alert.  Tolerating meds and diet.  8/30 withdrawal symptoms improved but still having significant anxiety.  Psychiatry consultation and Addiction Medicine naltraxone start appreciated.  EEG free of Sz so will stop Keppra.    F/U with outpatient treatment and PCP for lab recheck.    Consultations This Hospital Stay   CARE MANAGEMENT / SOCIAL WORK IP CONSULT  PHARMACY TO DOSE MEDICATION  PHYSICAL THERAPY ADULT IP CONSULT  OCCUPATIONAL THERAPY ADULT IP CONSULT  PSYCHIATRY IP CONSULT  ADDICTION MEDICINE INPATIENT CONSULT    Code Status   Prior    Time Spent on this Encounter   I, Laz Carl MD, personally saw the patient today and spent greater than 30 minutes discharging this patient.       Laz Carl MD  [facility]  [facility]  [address] [address]  Phone: [phone]  Fax: [phone]  ______________________________________________________________________    Physical Exam   Vital Signs: Temp: 97.4  F (36.3  C) Temp src: Oral BP: (!) 170/103 (notified RN) Pulse: 80   Resp: 18 SpO2: 100 % O2 Device: None (Room air)    Weight: 121 lbs 11.2 oz  Constitutional: awake, alert, cooperative, no apparent distress, and appears stated age  Eyes: sclera clear  ENT: Normocephalic, without obvious abnormality, atraumatic, sinuses nontender on palpation, external ears without lesions, oral pharynx with moist mucous membranes, tonsils without erythema or exudates, gums normal and good dentition.  Hematologic / Lymphatic: no cervical lymphadenopathy  Respiratory: No increased work of breathing, good air exchange, clear to auscultation bilaterally, no crackles or wheezing  Cardiovascular: Normal apical impulse, regular rate and rhythm, normal S1 and S2, no S3 or S4, and no murmur noted  GI: No scars, normal bowel sounds, soft, non-distended, non-tender, no masses palpated, no hepatosplenomegally  Skin: no bruising or bleeding  Musculoskeletal: There is no redness, warmth, or swelling of the joints.  Full range of motion noted.  Motor strength is 5 out of 5 all extremities bilaterally.  Tone is normal.  Neurologic: Awake, alert, oriented to name, place and time.  Cranial nerves II-XII are grossly intact.  Motor is 5 out of 5 bilaterally.  Cerebellar finger to nose, heel to shin intact.  Sensory is intact.  Babinski down going, Romberg negative, and gait is normal.  Neuropsychiatric: General: normal, calm and normal eye contact  Level of consciousness: alert / normal  Affect: normal       Primary Care Physician   Maribell Betancur    Discharge Orders      Reason for your hospital stay    Alcohol withdrawal Seizure     Follow-up and recommended labs and tests     Follow up with primary care provider, Maribell  TED Betancur, within 7 days for hospital follow- up.  No follow up labs or test are needed.     Activity    Your activity upon discharge: activity as tolerated     Discharge Instructions    Follow up with Chem Dep treatment.     Diet    Follow this diet upon discharge: Orders Placed This Encounter      Regular Diet Adult       Significant Results and Procedures   Results for orders placed or performed during the hospital encounter of 08/27/21   XR Knee Right 1/2 Views    Narrative    EXAM: XR KNEE RT 1 /2 VW  LOCATION: [facility]  DATE/TIME: 8/27/2021 8:32 AM    INDICATION: unwitnessed fall 2/2 alcohol withdrawal with seizures, abrasion on R knee  COMPARISON: 10/24/2018      Impression    IMPRESSION: Normal joint spaces and alignment. No fracture or joint effusion.   Cervical spine CT w/o contrast    Narrative    EXAM: CT CERVICAL SPINE W/O CONTRAST  LOCATION: [facility]  DATE/TIME: 8/27/2021 8:12 AM    INDICATION: Trauma, pain.  COMPARISON: None.  TECHNIQUE: Routine CT Cervical Spine without IV contrast. Multiplanar reformats. Dose reduction techniques were used.    FINDINGS:    No acute displaced fractures are demonstrated. Vertebral body heights are unremarkable. The occipital condyles appear intact. 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    There is straightening of the normal cervical lordosis. There is minimal C5-C6 retrolisthesis. Facet alignment is symmetric.    No attenuation abnormalities of the spinal canal are demonstrated to suggest epidural hematoma.    There is mild C5-C6 and C4-C5 spondylosis, characterized by intervertebral disc height loss, broad-based disc bulging, and interbody/uncovertebral spurring. There is mild spinal canal stenosis at these levels, as well as at C3-C4, where there is a   shallow disc bulge. There is mild right C3-C4, moderate-to-severe right C4-C5, and severe left C5-C6 neural foraminal  stenosis.    The soft tissues are unremarkable. There is no prevertebral soft tissue swelling.    The imaged lung apices are unremarkable.      Impression    IMPRESSION:  1.  No evidence of acute displaced fracture.  2.  No evidence of traumatic subluxation.  3.  Degenerative changes.   Head CT w/o contrast    Narrative    EXAM: CT HEAD W/O CONTRAST  LOCATION: [facility]  DATE/TIME: 8/27/2021 8:12 AM    INDICATION: unwitnessed fall 2/2 alcohol withdrawal with seizures, witnessed seizure x1 while in ED  COMPARISON: None.  TECHNIQUE: Routine CT Head without IV contrast. Multiplanar reformats. Dose reduction techniques were used.    FINDINGS:  INTRACRANIAL CONTENTS: No intracranial hemorrhage, extraaxial collection, or mass effect.  No CT evidence of acute infarct. Unremarkable parenchymal attenuation. Age-appropriate ventricles and sulci.     VISUALIZED ORBITS/SINUSES/MASTOIDS: No intraorbital abnormality. No paranasal sinus mucosal disease. No middle ear or mastoid effusion.    BONES/SOFT TISSUES: No scalp hematoma. No skull fracture.      Impression    IMPRESSION:  1.  No evidence of acute hemorrhage or other acute intracranial abnormality.  2.  No evidence of acute calvarial fracture.   XR Pelvis and Hip Right 2 Views    Narrative    EXAM: XR PELVIS AND HIP RIGHT 2 VIEWS  LOCATION: [facility]  DATE/TIME: 8/27/2021 8:29 AM    INDICATION: Unwitnessed fall, bruising right hip.  COMPARISON: None.      Impression    IMPRESSION: Normal joint spaces and alignment. No fracture.   XR Chest 1 View    Narrative    EXAM: XR CHEST 1 VIEW  LOCATION: [facility]  DATE/TIME: 8/27/2021 8:26 AM    INDICATION: unwitnessed fall 2/2 alcohol withdrawal with seizures, witnessed seizure x1 while in ED  COMPARISON: Rib radiographs 07/24/2020       Impression    IMPRESSION: No pneumothorax. No pleural fluid. The cardiomediastinal silhouette is normal in size.  Slightly displaced fractures of the posterior lateral right sixth through 10th ribs are again seen. Nonunion is possible as there is only minimal callus   formation. No new fractures identified.    XR Foot Right G/E 3 Views    Narrative    EXAM: XR FOOT RIGHT G/E 3 VIEWS  LOCATION: [facility]  DATE/TIME: 8/27/2021 8:31 AM    INDICATION: unwitnessed fall 2/2 alcohol withdrawal with seizures, abrasion on foot  COMPARISON: None.      Impression    IMPRESSION: Normal joint spaces and alignment. No fracture.       Discharge Medications   Discharge Medication List as of 8/31/2021  5:03 PM      START taking these medications    Details   folic acid (FOLVITE) 1 MG tablet Take 1 tablet (1 mg) by mouth daily, Disp-30 tablet, R-0, E-Prescribe      hydrOXYzine (ATARAX) 25 MG tablet Take 1 tablet (25 mg) by mouth 3 times daily as needed for anxiety, Disp-60 tablet, R-0, E-Prescribe      naltrexone (DEPADE/REVIA) 50 MG tablet Take 1 tablet (50 mg) by mouth At Bedtime Take 1/2 tab WITH FOOD EACH NIGHT FOR 3 NIGHTS, THE TAKE 1 TAB EACH NIGHT., Disp-30 tablet, R-0, No Print Out      nicotine (NICODERM CQ) 21 MG/24HR 24 hr patch Place 1 patch onto the skin daily, Disp-28 patch, R-0, E-Prescribe         CONTINUE these medications which have NOT CHANGED    Details   carboxymethylcellulose (REFRESH LIQUIGEL) 1 % ophthalmic solution [CARBOXYMETHYLCELLULOSE (REFRESH LIQUIGEL) 1 % OPHTHALMIC SOLUTION] Apply 1 drop to eye 2 (two) times a day as needed., Historical      escitalopram oxalate (LEXAPRO) 20 MG tablet [ESCITALOPRAM OXALATE (LEXAPRO) 20 MG TABLET] Take 20 mg by mouth daily., Historical      glucosamine-chondroitin 500-400 mg cap Take 1 capsule by mouth daily as needed , Historical      Lactobacillus rhamnosus GG (CULTURELLE) 10-15 Billion cell capsule Take 1 capsule by mouth daily as needed , Historical      loratadine (CLARITIN) 10 MG tablet Take 10 mg by mouth daily as needed for allergies, Historical       multivitamin capsule [MULTIVITAMIN CAPSULE] Take 1 capsule by mouth at bedtime. , Historical      nicotine polacrilex (NICORETTE) 4 MG gum Place 4 mg inside cheek as needed , Historical      omeprazole (PRILOSEC) 20 MG capsule [OMEPRAZOLE (PRILOSEC) 20 MG CAPSULE] Take 1 capsule (20 mg total) by mouth 2 (two) times a day before meals., Disp-60 capsule, R-0, Normal      polyethylene glycol (MIRALAX) 17 gram packet [POLYETHYLENE GLYCOL (MIRALAX) 17 GRAM PACKET] Take 1 packet (17 g total) by mouth daily as needed., R-0, OTC      traZODone (DESYREL) 50 MG tablet Take  mg by mouth At Bedtime , Historical           Allergies   Allergies   Allergen Reactions     Ciprofloxacin Anaphylaxis     Throat swelling

## 2021-08-31 NOTE — PROGRESS NOTES
Bedside EEG was performed that included photic stimulation and 3 minutes of hyperventilation. The patient was both awake and asleep during the recording.skin intact.   EEG# .  EEG [id number] system was used.-

## 2021-08-31 NOTE — PLAN OF CARE
Problem: Sleep Disturbance (Disruptive Behavior)  Goal: Improved Sleep (Disruptive Behavior)  Outcome: Improving     Problem: Alcohol Withdrawal  Goal: Alcohol Withdrawal Symptom Control  Outcome: Improving    pt has slept well this shift with sitter at bedside. Up briefly to go to bathroom and returned to sleep. VSS 0 for CIWA.

## 2021-08-31 NOTE — PLAN OF CARE
Patient slept in late and awoke approximately 9:30 am. She was oriented , but sleepy. CIWA =0. Patient ate some yogurt and fruit for breakfast. Patient was seen by Jeana Thompson ( Psych). Awaiting  for Dr. Phan from Addiction Medicine to sliding scale patient this afternoon. Dr. Carl stated patient will probably discharge later today. Telemetry shows Normal Sinus Rhythm.Will continue to monitor.

## 2021-08-31 NOTE — CONSULTS
"    INITIAL PSYCHIATRIC CONSULTATION  This note was created with the help of Dragon dictation system. Grammatical and typing errors are not intentional.                REASON FOR REQUEST: severe anxiety and etoh wd      ASSESSMENT/RECOMMENDATIONS/PLAN :     Alcohol abuse, intoxication, withdrawal.  Alcohol withdrawal seizure/delirium: Resolving  Depression with anxiety likely exacerbated due to alcoholism.  Substance-induced mood disorder in the setting of alcohol abuse.  Sleep difficulties due to alcoholism and hospital environment.    Recommendations:  Social service to provide information of [facility] for MH and CD treatment outpatient basis   Patient has Olanzapine ordered on a PRN basis: use it for psychosis anxiety    to provide information chemical dependency treatment options in the community if she is interested.  Addiction medicine consult.    MENTAL STATUS EXAMINATION:   General Appearance: Not in acute distress, resting comfortably in bed.    Behavior: Good eye contact, no bizarre ideations  Speech: Coherent.  Thought Process: Linear, clear.  Thought content: No evidence of hallucinations, delusions or paranoia.    Thought Formation: Associations are connected  Judgment: Fair  Insight : Intact  Attention : Adequate  Memory: Sufficient  Fund Of Knowledge: Average  Affect: Neutral  Mood: Congruent  Alert : Awake  Suicidal ideation: Absent  Homicidal ideation: Absent  Orientation: X 3  Comprehension: Sufficient  Generative thought content: Adequate.    Language: Intact  Gait and Ambulation: Gait and ambulation at baseline.    Musculoskeletal: No tonal abnormalities    BP (!) 153/97 (BP Location: Right arm)   Pulse 83   Temp 97.5  F (36.4  C) (Oral)   Resp 20   Ht 1.676 m (5' 6\")   Wt 55.2 kg (121 lb 11.2 oz)   SpO2 98%   BMI 19.64 kg/m    :     HISTORY OF PRESENT ILLNESS:   Presenting history to include: Per HMS/Specialists:   Patient denies any nausea.  She states she is " "very tired.  Denies any pain.  Denies shortness of breath.  Most of history was obtained from Mack, her significant other who is at bedside.  Patient has long history of alcoholism with treatment x2 in the past the last was 7 years ago with 5 years of sobriety.  Uncertain why she started drinking again but she tends to typically work during the day and then drink heavily at night.  She had a virtual court appointment in the for our meeting yesterday and because of that she had not had anything to drink for a number of hours and then started feeling poorly at that point and because of that she did not have anything else to drink so it has been about 48 hours and this continued to be worse and then Mack who was sleeping in another room heard a commotion in my exam and found her on the floor and unresponsive and called EMS.     Upon assessment, patient was noted to be sleeping but easily arousable.  She woke up remaining oriented to hospital environment.  She was pleasant throughout this assessment.  She acknowledged consumption of alcohol and while intoxicated falling out of bed.  She was brought into ER by her boyfriend.  She denied any hallucinations, delusions or paranoia.  She denied any daniel hypomania.  She did not report of any thoughts of self-harm or suicidality.  She denied any psychosis or daniel.  History of chemical dependency treatment x2 to no avail.  Longest sobriety 5 years.  She often uses alcohol to target and treat her anxiety but mostly \"not really\".  She denies any worsening hallucinations or delusions after consumption of alcohol.  Home medications to include but not limited to Lexapro 20 mg which she has taken for more than 6 months.  She wants to continue with her medications.  She feels safe returning to home.  She believes her anxiety is fairly well controlled in hospital.    Review of Systems:As per HPI. Remainders of 12 point review of systems negative.  Psychiatric ROS:  Patient denies any " "concerns for psychiatric review of system.      PFSH reviewed  and not pertinent to chief complaint/reason for visit  BP (!) 151/99 (BP Location: Right arm)   Pulse 109   Temp 98.1  F (36.7  C) (Oral)   Resp 20   Ht 1.676 m (5' 6\")   Wt 55.2 kg (121 lb 11.2 oz)   SpO2 97%   BMI 19.64 kg/m    Alcohol, Blood (mg/dL)   Date Value   08/27/2021 <10     @24HOURRESULTS@  Recent Results (from the past 72 hour(s))   Lactic Acid STAT    Collection Time: 08/28/21  9:58 AM   Result Value Ref Range    Lactic Acid 0.7 0.7 - 2.0 mmol/L   Potassium    Collection Time: 08/28/21  2:57 PM   Result Value Ref Range    Potassium 3.8 3.5 - 5.0 mmol/L   Extra Red Top Tube    Collection Time: 08/28/21  2:57 PM   Result Value Ref Range    Hold Specimen JIC    Phosphorus    Collection Time: 08/29/21  5:41 AM   Result Value Ref Range    Phosphorus 2.4 (L) 2.5 - 4.5 mg/dL   Magnesium    Collection Time: 08/29/21  5:41 AM   Result Value Ref Range    Magnesium 1.6 (L) 1.8 - 2.6 mg/dL   Hepatic function panel    Collection Time: 08/29/21  5:41 AM   Result Value Ref Range    Bilirubin Total 1.5 (H) 0.0 - 1.0 mg/dL    Bilirubin Direct 0.7 (H) <=0.5 mg/dL    Protein Total 6.0 6.0 - 8.0 g/dL    Albumin 3.0 (L) 3.5 - 5.0 g/dL    Alkaline Phosphatase 201 (H) 45 - 120 U/L    AST 79 (H) 0 - 40 U/L    ALT 34 0 - 45 U/L   Potassium    Collection Time: 08/29/21  5:41 AM   Result Value Ref Range    Potassium 3.4 (L) 3.5 - 5.0 mmol/L   Potassium    Collection Time: 08/29/21  3:01 PM   Result Value Ref Range    Potassium 3.8 3.5 - 5.0 mmol/L   Hepatic function panel    Collection Time: 08/30/21  4:55 AM   Result Value Ref Range    Bilirubin Total 1.2 (H) 0.0 - 1.0 mg/dL    Bilirubin Direct 0.5 <=0.5 mg/dL    Protein Total 6.7 6.0 - 8.0 g/dL    Albumin 3.4 (L) 3.5 - 5.0 g/dL    Alkaline Phosphatase 197 (H) 45 - 120 U/L    AST 55 (H) 0 - 40 U/L    ALT 35 0 - 45 U/L   Magnesium    Collection Time: 08/30/21  4:55 AM   Result Value Ref Range    Magnesium 1.3 " (L) 1.8 - 2.6 mg/dL   Potassium    Collection Time: 08/30/21  4:55 AM   Result Value Ref Range    Potassium 3.5 3.5 - 5.0 mmol/L   Phosphorus    Collection Time: 08/30/21  4:55 AM   Result Value Ref Range    Phosphorus 2.6 2.5 - 4.5 mg/dL   Hepatic function panel    Collection Time: 08/31/21  5:02 AM   Result Value Ref Range    Bilirubin Total 0.9 0.0 - 1.0 mg/dL    Bilirubin Direct 0.4 <=0.5 mg/dL    Protein Total 6.7 6.0 - 8.0 g/dL    Albumin 3.3 (L) 3.5 - 5.0 g/dL    Alkaline Phosphatase 190 (H) 45 - 120 U/L    AST 39 0 - 40 U/L    ALT 30 0 - 45 U/L   Potassium    Collection Time: 08/31/21  5:02 AM   Result Value Ref Range    Potassium 3.4 (L) 3.5 - 5.0 mmol/L   Phosphorus    Collection Time: 08/31/21  5:02 AM   Result Value Ref Range    Phosphorus 4.5 2.5 - 4.5 mg/dL       PMH:   Past Medical History:   Diagnosis Date     Acne      Alcohol withdrawal seizure (H)      Alcoholism /alcohol abuse (H)      Allergic rhinitis      Anxiety      Hypokalemia      Hypomagnesemia            Current Medications:Scheduled Meds:    [Held by provider] cloNIDine  0.1 mg Oral Q8H     cloNIDine   Transdermal Q8H     cloNIDine  1 patch Transdermal Weekly     - MEDICATION INSTRUCTIONS -   Does not apply See Admin Instructions     escitalopram  20 mg Oral Daily     folic acid  1 mg Oral Daily     levETIRAcetam  500 mg Oral BID     magnesium oxide  400 mg Oral BID     metoprolol tartrate  50 mg Oral BID     multivitamin w/minerals  1 tablet Oral Daily     nicotine  1 patch Transdermal Daily     nicotine   Transdermal Q8H     pantoprazole  40 mg Oral QAM AC     sodium chloride (PF)  3 mL Intracatheter Q8H     thiamine  100 mg Oral Daily     Continuous Infusions:  PRN Meds:.acetaminophen **OR** acetaminophen, carboxymethylcellulose PF, flumazenil, OLANZapine zydis **OR** haloperidol lactate, lidocaine 4%, lidocaine (buffered or not buffered), LORazepam **OR** LORazepam, melatonin, polyethylene glycol, prochlorperazine, sodium chloride  (PF), traZODone                Family History: PERSONALLY REVIEWED.No family history on file.  Pertinent Family hx not pertinent to Chief Complaint or reason for visit.     Social History:  PERSONALLY REVIEWED.  Social History     Socioeconomic History     Marital status:      Spouse name: Not on file     Number of children: Not on file     Years of education: Not on file     Highest education level: Not on file   Occupational History     Not on file   Tobacco Use     Smoking status: Current Every Day Smoker     Packs/day: 1.00     Smokeless tobacco: Never Used   Substance and Sexual Activity     Alcohol use: No     Drug use: No     Sexual activity: Not on file   Other Topics Concern     Not on file   Social History Narrative     Not on file     Social Determinants of Health     Financial Resource Strain:      Difficulty of Paying Living Expenses:    Food Insecurity:      Worried About Running Out of Food in the Last Year:      Ran Out of Food in the Last Year:    Transportation Needs:      Lack of Transportation (Medical):      Lack of Transportation (Non-Medical):    Physical Activity:      Days of Exercise per Week:      Minutes of Exercise per Session:    Stress:      Feeling of Stress :    Social Connections:      Frequency of Communication with Friends and Family:      Frequency of Social Gatherings with Friends and Family:      Attends [employer] Services:      Active Member of Clubs or Organizations:      Attends Club or Organization Meetings:      Marital Status:    Intimate Partner Violence:      Fear of Current or Ex-Partner:      Emotionally Abused:      Physically Abused:      Sexually Abused:     not pertinent to Chief Complaint or reason for visit.             Allergies as of 06/01/2014 Reviewed     Review of Systems:As per HPI. Remainders of 12 point review of systems negative.    Review of Pertinent Laboratory:      PERSONALLY REVIEWED.    Physical Exam: Temp:  [97.2  F (36.2  C)-98.6  F (37   C)] 98.1  F (36.7  C)  Pulse:  [] 109  Resp:  [16-22] 20  BP: (144-174)/() 151/99  SpO2:  [95 %-99 %] 97 %   Vitals: reviewed in chart     Physical exam as per medical team: reviewed in chart      diagnoses, risk and benefits of medications discussed with staff. Care coordination with care management team.   Thank you for this consultation.       Jeana Thompson; NP  Mental health & Addiction Services        This note was created with the help of Dragon dictation system. Grammatical and typing errors are not intentional.

## 2021-08-31 NOTE — PLAN OF CARE
Problem: Alcohol Withdrawal  Goal: Alcohol Withdrawal Symptom Control  Outcome: Improving   CIWA at beginning of shift 3 and an hour and half later pt became agitated (pacing in room), anxious and tremulous. Gave 2mg PO Ativan per CIWA score of 17 and after 30 minutes pt scores a 2 and was calm and relaxed. After 3 hours pt scored a 14 on CIWA for same reasons and was given 2mg PO ativan which was effective and CIWA score a 1 after that.   Problem: Substance Misuse (Alcohol Withdrawal)  Goal: Readiness for Change Identified  Outcome: Improving   Pt states that she wants to quit drinking and start going back to her AA meetings. Significant other at bedside and supportive.   Pt eating, drinking and voiding. Easily redirected when agitated and anxious.   NSR on tele.

## 2021-08-31 NOTE — PROGRESS NOTES
AVS instructions reviewed with pt and questions answered. Packet given to pt. All belongings packed and verified by pt.

## 2021-08-31 NOTE — CONSULTS
3:29 PM    SW met with patient to give resources per psych RN request.  SW gave information for [facility] for MH/CD tx options as well as places to go for Rule 25 assessment.  Pt had no further questions/needs for SW.    Jeannine Anderson, LICSW  8/31/2021

## 2021-09-01 ENCOUNTER — HOSPITAL ENCOUNTER (EMERGENCY)
Facility: CLINIC | Age: 51
Discharge: HOME | End: 2021-09-01
Attending: EMERGENCY MEDICINE | Admitting: EMERGENCY MEDICINE
Payer: COMMERCIAL

## 2021-09-01 VITALS
SYSTOLIC BLOOD PRESSURE: 137 MMHG | HEART RATE: 97 BPM | TEMPERATURE: 98 F | DIASTOLIC BLOOD PRESSURE: 90 MMHG | OXYGEN SATURATION: 98 % | BODY MASS INDEX: 19.69 KG/M2 | RESPIRATION RATE: 18 BRPM | WEIGHT: 122 LBS

## 2021-09-01 DIAGNOSIS — G40.89 OTHER SEIZURES (H): ICD-10-CM

## 2021-09-01 LAB
ANION GAP SERPL CALCULATED.3IONS-SCNC: 13 MMOL/L (ref 5–18)
BASOPHILS # BLD AUTO: 0 10E3/UL (ref 0–0.2)
BASOPHILS NFR BLD AUTO: 1 %
BUN SERPL-MCNC: 6 MG/DL (ref 8–22)
CALCIUM SERPL-MCNC: 9.7 MG/DL (ref 8.5–10.5)
CHLORIDE BLD-SCNC: 103 MMOL/L (ref 98–107)
CO2 SERPL-SCNC: 25 MMOL/L (ref 22–31)
CREAT SERPL-MCNC: 0.62 MG/DL (ref 0.6–1.1)
EOSINOPHIL # BLD AUTO: 0.1 10E3/UL (ref 0–0.7)
EOSINOPHIL NFR BLD AUTO: 1 %
ERYTHROCYTE [DISTWIDTH] IN BLOOD BY AUTOMATED COUNT: 13.9 % (ref 10–15)
GFR SERPL CREATININE-BSD FRML MDRD: >90 ML/MIN/1.73M2
GLUCOSE BLD-MCNC: 112 MG/DL (ref 70–125)
HCT VFR BLD AUTO: 31.5 % (ref 35–47)
HGB BLD-MCNC: 10.8 G/DL (ref 11.7–15.7)
IMM GRANULOCYTES # BLD: 0 10E3/UL
IMM GRANULOCYTES NFR BLD: 1 %
LYMPHOCYTES # BLD AUTO: 1.5 10E3/UL (ref 0.8–5.3)
LYMPHOCYTES NFR BLD AUTO: 26 %
MCH RBC QN AUTO: 36.5 PG (ref 26.5–33)
MCHC RBC AUTO-ENTMCNC: 34.3 G/DL (ref 31.5–36.5)
MCV RBC AUTO: 106 FL (ref 78–100)
MONOCYTES # BLD AUTO: 0.7 10E3/UL (ref 0–1.3)
MONOCYTES NFR BLD AUTO: 12 %
NEUTROPHILS # BLD AUTO: 3.5 10E3/UL (ref 1.6–8.3)
NEUTROPHILS NFR BLD AUTO: 59 %
NRBC # BLD AUTO: 0 10E3/UL
NRBC BLD AUTO-RTO: 0 /100
PLATELET # BLD AUTO: 247 10E3/UL (ref 150–450)
POTASSIUM BLD-SCNC: 3.1 MMOL/L (ref 3.5–5)
RBC # BLD AUTO: 2.96 10E6/UL (ref 3.8–5.2)
SODIUM SERPL-SCNC: 141 MMOL/L (ref 136–145)
WBC # BLD AUTO: 5.8 10E3/UL (ref 4–11)

## 2021-09-01 PROCEDURE — 85004 AUTOMATED DIFF WBC COUNT: CPT | Performed by: EMERGENCY MEDICINE

## 2021-09-01 PROCEDURE — 80048 BASIC METABOLIC PNL TOTAL CA: CPT | Performed by: EMERGENCY MEDICINE

## 2021-09-01 PROCEDURE — 258N000003 HC RX IP 258 OP 636: Performed by: EMERGENCY MEDICINE

## 2021-09-01 PROCEDURE — 250N000011 HC RX IP 250 OP 636: Performed by: EMERGENCY MEDICINE

## 2021-09-01 PROCEDURE — 99283 EMERGENCY DEPT VISIT LOW MDM: CPT | Mod: 25

## 2021-09-01 PROCEDURE — 36415 COLL VENOUS BLD VENIPUNCTURE: CPT | Performed by: EMERGENCY MEDICINE

## 2021-09-01 PROCEDURE — 80177 DRUG SCRN QUAN LEVETIRACETAM: CPT | Performed by: EMERGENCY MEDICINE

## 2021-09-01 RX ORDER — LEVETIRACETAM 500 MG/1
500 TABLET ORAL 2 TIMES DAILY
Qty: 60 TABLET | Refills: 0 | Status: ON HOLD | OUTPATIENT
Start: 2021-09-01 | End: 2022-01-17

## 2021-09-01 RX ADMIN — LEVETIRACETAM 1000 MG: 100 INJECTION, SOLUTION INTRAVENOUS at 21:14

## 2021-09-02 ENCOUNTER — PATIENT OUTREACH (OUTPATIENT)
Dept: CARE COORDINATION | Facility: CLINIC | Age: 51
End: 2021-09-02

## 2021-09-02 DIAGNOSIS — Z71.89 OTHER SPECIFIED COUNSELING: ICD-10-CM

## 2021-09-02 LAB — LEVETIRACETAM (KEPPRA): <2 UG/ML (ref 6–46)
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
## 2021-09-02 NOTE — ED PROVIDER NOTES
EMERGENCY DEPARTMENT ENCOUNTER      NAME: Evelyn Hudson  AGE: 50 year old female  YOB: 1970  MRN: [id number]  EVALUATION DATE & TIME: 9/1/2021  8:00 PM    PCP: Maribell Betancur    ED PROVIDER: Montrell Adams D.O.      Chief Complaint   Patient presents with     Seizures         HPI    Patient information was obtained from: Patient    Use of : N/A      Evelyn Hudson is a 50 year old female with a history of alcohol withdrawal seizure who presents to the ED for evaluation of seizure.    Per chart review, the patient was admitted to this hospital on 8/27 for alcohol withdrawal seizure. Patient had 1 seizure prior to ED arrival and 1 seizure shortly after arrival in ED. Treated with lorazepam 2 mg and IV levetiracetam 1000 mg. On 8/28 the patient failed bedside swallow evaluation. On 8/29 the patient tolerated medication and diet. On 8/30 withdrawal symptoms improved but she was still having anxiety. Patient started on naltrexone. Normal EEG and Keppra was stopped.    Patient reports she was on her way to  her prescription when she had a seizure in her car. She reports no trauma during seizure. She felt confused and dizzy after the seizure. The patient was discharged yesterday after a 4 day stay in this hospital for alcohol withdrawal and seizures. She denies chest pain, shortness of breath, cough, congestion, sore throat, nausea, vomiting, diarrhea, abdominal pain, or any other complaints at this time.    REVIEW OF SYSTEMS  Constitutional:  Denies fever, chills, weight loss or weakness  Eyes:  No pain, discharge, redness  HENT:  Denies sore throat, ear pain, congestion  Respiratory: No SOB, wheeze or cough  Cardiovascular:  No CP, palpitations  GI:  Denies abdominal pain, nausea, vomiting, diarrhea  : Denies dysuria, hematuria  Musculoskeletal:  Denies any new muscle/joint pain, swelling or loss of function.  Skin:  Denies rash, pallor  Neurologic:  Denies headache, focal weakness  or sensory changes. Endorses seizure, dizziness, confusion.  Lymph: Denies swollen nodes    All other systems negative unless noted in HPI.    PAST MEDICAL HISTORY:  Past Medical History:   Diagnosis Date     Acne      Alcohol withdrawal seizure (H)      Alcoholism /alcohol abuse (H)      Allergic rhinitis      Anxiety      Hypokalemia      Hypomagnesemia        PAST SURGICAL HISTORY:  Past Surgical History:   Procedure Laterality Date     COLON SURGERY       INSERT INTRACORONARY STENT N/A 5/8/2017    Procedure: EXCISION BACK AND RIGHT ARM LIPOMA;  Surgeon: Rickey Anne MD;  Location: [facility];  Service:      IR INTERCOSTAL STEROID INJECTION SINGLE LEVEL  4/30/2020     IR INTERCOSTAL STEROID INJECTION SINGLE LEVEL  4/30/2020     RECTAL POLYPECTOMY       TUBAL LIGATION Bilateral 6/3/2014    Procedure: BILATERAL LAPAROSCOPIC TUBAL LIGATION ;  Surgeon: Lorena Jiménez MD;  Location: [facility];  Service:          CURRENT MEDICATIONS:    No current facility-administered medications for this encounter.     Current Outpatient Medications   Medication     levETIRAcetam (KEPPRA) 500 MG tablet     carboxymethylcellulose (REFRESH LIQUIGEL) 1 % ophthalmic solution     escitalopram oxalate (LEXAPRO) 20 MG tablet     folic acid (FOLVITE) 1 MG tablet     glucosamine-chondroitin 500-400 mg cap     hydrOXYzine (ATARAX) 25 MG tablet     Lactobacillus rhamnosus GG (CULTURELLE) 10-15 Billion cell capsule     loratadine (CLARITIN) 10 MG tablet     multivitamin capsule     naltrexone (DEPADE/REVIA) 50 MG tablet     nicotine (NICODERM CQ) 21 MG/24HR 24 hr patch     nicotine polacrilex (NICORETTE) 4 MG gum     omeprazole (PRILOSEC) 20 MG capsule     polyethylene glycol (MIRALAX) 17 gram packet     traZODone (DESYREL) 50 MG tablet         ALLERGIES:  Allergies   Allergen Reactions     Ciprofloxacin Anaphylaxis     Throat swelling       FAMILY HISTORY:  History reviewed. No pertinent family history.    SOCIAL  HISTORY:  Social History     Socioeconomic History     Marital status:      Spouse name: Not on file     Number of children: Not on file     Years of education: Not on file     Highest education level: Not on file   Occupational History     Not on file   Tobacco Use     Smoking status: Current Every Day Smoker     Packs/day: 1.00     Smokeless tobacco: Never Used   Substance and Sexual Activity     Alcohol use: No     Drug use: No     Sexual activity: Not on file   Other Topics Concern     Not on file   Social History Narrative     Not on file     Social Determinants of Health     Financial Resource Strain:      Difficulty of Paying Living Expenses:    Food Insecurity:      Worried About Running Out of Food in the Last Year:      Ran Out of Food in the Last Year:    Transportation Needs:      Lack of Transportation (Medical):      Lack of Transportation (Non-Medical):    Physical Activity:      Days of Exercise per Week:      Minutes of Exercise per Session:    Stress:      Feeling of Stress :    Social Connections:      Frequency of Communication with Friends and Family:      Frequency of Social Gatherings with Friends and Family:      Attends Synagogue Services:      Active Member of Clubs or Organizations:      Attends Club or Organization Meetings:      Marital Status:    Intimate Partner Violence:      Fear of Current or Ex-Partner:      Emotionally Abused:      Physically Abused:      Sexually Abused:        VITALS:  Patient Vitals for the past 24 hrs:   BP Temp Temp src Pulse Resp SpO2 Weight   09/01/21 2230 (!) 137/90 -- -- 97 18 98 % --   09/01/21 2200 138/81 -- -- 101 18 98 % --   09/01/21 2130 (!) 144/92 -- -- 96 18 97 % --   09/01/21 2115 (!) 147/90 -- -- 102 18 96 % --   09/01/21 2006 (!) 163/107 98  F (36.7  C) Oral (!) 122 16 98 % 55.3 kg (122 lb)       PHYSICAL EXAM    VITAL SIGNS: BP (!) 137/90   Pulse 97   Temp 98  F (36.7  C) (Oral)   Resp 18   Wt 55.3 kg (122 lb)   SpO2 98%   BMI  19.69 kg/m      General Appearance: Well-appearing, well-nourished, no acute distress   Head:  Normocephalic, without obvious abnormality, atraumatic  Eyes:  PERRL, conjunctiva/corneas clear, EOM's intact,  ENT:  Lips, mucosa, and tongue normal, membranes are moist without pallor  Neck:  Normal ROM, symmetrical, trachea midline    Cardio:  Regular rate and rhythm, no murmur, rub or gallop, 2+ pulses symmetric in all extremities  Pulm:  Clear to auscultation bilaterally, respirations unlabored,  Abdomen:  Soft, non-tender, no rebound or guarding.  Musculoskeletal: Full ROM, no edema, no cyanosis, good ROM of major joints  Integument:  Warm, Dry, No erythema, No rash.    Neurologic:  Alert & oriented.  No focal deficits appreciated.  Ambulatory.  Psychiatric:  Affect normal, Judgment normal, Mood normal.      LABS  Results for orders placed or performed during the hospital encounter of 09/01/21 (from the past 24 hour(s))   CBC with platelets + differential    Narrative    The following orders were created for panel order CBC with platelets + differential.  Procedure                               Abnormality         Status                     ---------                               -----------         ------                     CBC with platelets and d...[[id number]]  Abnormal            Final result                 Please view results for these tests on the individual orders.   Basic metabolic panel   Result Value Ref Range    Sodium 141 136 - 145 mmol/L    Potassium 3.1 (L) 3.5 - 5.0 mmol/L    Chloride 103 98 - 107 mmol/L    Carbon Dioxide (CO2) 25 22 - 31 mmol/L    Anion Gap 13 5 - 18 mmol/L    Urea Nitrogen 6 (L) 8 - 22 mg/dL    Creatinine 0.62 0.60 - 1.10 mg/dL    Calcium 9.7 8.5 - 10.5 mg/dL    Glucose 112 70 - 125 mg/dL    GFR Estimate >90 >60 mL/min/1.73m2   CBC with platelets and differential   Result Value Ref Range    WBC Count 5.8 4.0 - 11.0 10e3/uL    RBC Count 2.96 (L) 3.80 - 5.20 10e6/uL    Hemoglobin  10.8 (L) 11.7 - 15.7 g/dL    Hematocrit 31.5 (L) 35.0 - 47.0 %     (H) 78 - 100 fL    MCH 36.5 (H) 26.5 - 33.0 pg    MCHC 34.3 31.5 - 36.5 g/dL    RDW 13.9 10.0 - 15.0 %    Platelet Count 247 150 - 450 10e3/uL    % Neutrophils 59 %    % Lymphocytes 26 %    % Monocytes 12 %    % Eosinophils 1 %    % Basophils 1 %    % Immature Granulocytes 1 %    NRBCs per 100 WBC 0 <1 /100    Absolute Neutrophils 3.5 1.6 - 8.3 10e3/uL    Absolute Lymphocytes 1.5 0.8 - 5.3 10e3/uL    Absolute Monocytes 0.7 0.0 - 1.3 10e3/uL    Absolute Eosinophils 0.1 0.0 - 0.7 10e3/uL    Absolute Basophils 0.0 0.0 - 0.2 10e3/uL    Absolute Immature Granulocytes 0.0 <=0.0 10e3/uL    Absolute NRBCs 0.0 10e3/uL         RADIOLOGY  No orders to display        FINAL IMPRESSION:  1. Other seizures (H)          ED COURSE & MEDICAL DECISION MAKIN:18 PM I met with the patient to gather history and to perform my initial exam. I discussed the plan for care while in the Emergency Department.  8:28 PM I paged for neurology.  8:35 PM I spoke with neurology, Dr. Arce, to discuss the patient's case. He recommended 1000 mg Keppra, and then 500 mg BID with discharge home and follow up with neurology.  8:50 PM I updated the patient on neurology consult and plan for likely discharge after lab results.  10:23 PM I rechecked and updated the patient. We discussed the plan for discharge and the patient is agreeable. Reviewed supportive cares, symptomatic treatment, outpatient follow up, and reasons to return to the Emergency Department. Patient to be discharged by ED RN.     Pertinent Labs & Imaging studies reviewed. (See chart for details)  50 year old female presents to the Emergency Department for evaluation of seizure activity.  Patient was recently hospitalized for alcohol withdrawal seizures, had a negative EEG, therefore she is not discharged on seizure medications.  Her seizure tonight was witnessed by her  and did have definitive postictal  period.  She was mildly tachycardic initially when she arrived, but this did improve somewhat prior to my initial evaluation, and she was fully recovered from her postictal state at that point.  Exam was otherwise largely unremarkable in this patient, she was not show any signs of withdrawal.  I consulted with neurology, and the recommendation was for outpatient follow-up with them, as well as started the patient on Keppra.  He did recommend a single IV dose of Keppra of 1000 mg, followed by 500 twice daily orally going forward.  Patient was agreeable with this plan.  Additional imaging was not indicated.  Labs are largely unremarkable on patient.  A referral has been placed for this patient, and she will follow-up with her primary care provider as an outpatient as well.  Return precautions discussed.    At the conclusion of the encounter I discussed the results of all of the tests and the disposition. The questions were answered. The patient or family acknowledged understanding and was agreeable with the care plan.      MEDICATIONS GIVEN IN THE EMERGENCY:  Medications   levETIRAcetam (KEPPRA) 1,000 mg in sodium chloride 0.9 % 100 mL intermittent infusion (0 mg Intravenous Stopped 9/1/21 2129)       NEW PRESCRIPTIONS STARTED AT TODAY'S ER VISIT  Discharge Medication List as of 9/1/2021 10:21 PM      START taking these medications    Details   levETIRAcetam (KEPPRA) 500 MG tablet Take 1 tablet (500 mg) by mouth 2 times daily, Disp-60 tablet, R-0, Local Print              Montrell Adams D.O.  Emergency Medicine  [facility] EMERGENCY ROOM  [address] [phone]  Dept: [phone]     Montrell Adams,   09/02/21 0051

## 2021-09-02 NOTE — ED TRIAGE NOTES
Pt presents via EMS after having seizure-like activity lasting 3 minutes while picking up prescriptions from [facility]. Pt reports she was discharged a day ago after spending 4 days in the hospital for withdrawal. PT denies feeling unwell. GCS 15 A&O x 4. Speech slightly slurred

## 2021-09-15 ENCOUNTER — TRANSFERRED RECORDS (OUTPATIENT)
Dept: HEALTH INFORMATION MANAGEMENT | Facility: CLINIC | Age: 51
End: 2021-09-15
Payer: COMMERCIAL

## 2021-11-11 ENCOUNTER — HOSPITAL ENCOUNTER (OUTPATIENT)
Dept: MAMMOGRAPHY | Facility: CLINIC | Age: 51
Discharge: HOME | End: 2021-11-11
Attending: NURSE PRACTITIONER | Admitting: NURSE PRACTITIONER
Payer: COMMERCIAL

## 2021-11-11 DIAGNOSIS — Z12.31 VISIT FOR SCREENING MAMMOGRAM: ICD-10-CM

## 2021-11-11 PROCEDURE — 77067 SCR MAMMO BI INCL CAD: CPT

## 2021-11-25 ENCOUNTER — APPOINTMENT (OUTPATIENT)
Dept: CT IMAGING | Facility: CLINIC | Age: 51
End: 2021-11-25
Attending: EMERGENCY MEDICINE
Payer: COMMERCIAL

## 2021-11-25 ENCOUNTER — HOSPITAL ENCOUNTER (EMERGENCY)
Facility: CLINIC | Age: 51
Discharge: HOME | End: 2021-11-25
Attending: EMERGENCY MEDICINE | Admitting: EMERGENCY MEDICINE
Payer: COMMERCIAL

## 2021-11-25 VITALS
RESPIRATION RATE: 18 BRPM | HEIGHT: 66 IN | BODY MASS INDEX: 22.02 KG/M2 | DIASTOLIC BLOOD PRESSURE: 92 MMHG | HEART RATE: 96 BPM | TEMPERATURE: 98.1 F | SYSTOLIC BLOOD PRESSURE: 146 MMHG | OXYGEN SATURATION: 100 % | WEIGHT: 137 LBS

## 2021-11-25 DIAGNOSIS — R91.1 NODULE OF LEFT LUNG: ICD-10-CM

## 2021-11-25 DIAGNOSIS — M54.6 ACUTE LEFT-SIDED THORACIC BACK PAIN: ICD-10-CM

## 2021-11-25 LAB
ANION GAP SERPL CALCULATED.3IONS-SCNC: 11 MMOL/L (ref 5–18)
BASOPHILS # BLD AUTO: 0.1 10E3/UL (ref 0–0.2)
BASOPHILS NFR BLD AUTO: 1 %
BUN SERPL-MCNC: 13 MG/DL (ref 8–22)
CALCIUM SERPL-MCNC: 10 MG/DL (ref 8.5–10.5)
CHLORIDE BLD-SCNC: 101 MMOL/L (ref 98–107)
CO2 SERPL-SCNC: 27 MMOL/L (ref 22–31)
CREAT SERPL-MCNC: 0.78 MG/DL (ref 0.6–1.1)
D DIMER PPP FEU-MCNC: 0.53 UG/ML FEU (ref 0–0.5)
EOSINOPHIL # BLD AUTO: 0.6 10E3/UL (ref 0–0.7)
EOSINOPHIL NFR BLD AUTO: 5 %
ERYTHROCYTE [DISTWIDTH] IN BLOOD BY AUTOMATED COUNT: 12.2 % (ref 10–15)
GFR SERPL CREATININE-BSD FRML MDRD: 89 ML/MIN/1.73M2
GLUCOSE BLD-MCNC: 82 MG/DL (ref 70–125)
HCT VFR BLD AUTO: 39.1 % (ref 35–47)
HGB BLD-MCNC: 13.3 G/DL (ref 11.7–15.7)
IMM GRANULOCYTES # BLD: 0 10E3/UL
IMM GRANULOCYTES NFR BLD: 0 %
LIPASE SERPL-CCNC: 52 U/L (ref 0–52)
LYMPHOCYTES # BLD AUTO: 2.6 10E3/UL (ref 0.8–5.3)
LYMPHOCYTES NFR BLD AUTO: 23 %
MCH RBC QN AUTO: 32.4 PG (ref 26.5–33)
MCHC RBC AUTO-ENTMCNC: 34 G/DL (ref 31.5–36.5)
MCV RBC AUTO: 95 FL (ref 78–100)
MONOCYTES # BLD AUTO: 1.1 10E3/UL (ref 0–1.3)
MONOCYTES NFR BLD AUTO: 10 %
NEUTROPHILS # BLD AUTO: 7.1 10E3/UL (ref 1.6–8.3)
NEUTROPHILS NFR BLD AUTO: 61 %
NRBC # BLD AUTO: 0 10E3/UL
NRBC BLD AUTO-RTO: 0 /100
PLATELET # BLD AUTO: 363 10E3/UL (ref 150–450)
POTASSIUM BLD-SCNC: 4 MMOL/L (ref 3.5–5)
RBC # BLD AUTO: 4.1 10E6/UL (ref 3.8–5.2)
SODIUM SERPL-SCNC: 139 MMOL/L (ref 136–145)
TROPONIN I SERPL-MCNC: <0.01 NG/ML (ref 0–0.29)
WBC # BLD AUTO: 11.4 10E3/UL (ref 4–11)

## 2021-11-25 PROCEDURE — 85379 FIBRIN DEGRADATION QUANT: CPT | Performed by: EMERGENCY MEDICINE

## 2021-11-25 PROCEDURE — 72128 CT CHEST SPINE W/O DYE: CPT

## 2021-11-25 PROCEDURE — 80048 BASIC METABOLIC PNL TOTAL CA: CPT | Performed by: EMERGENCY MEDICINE

## 2021-11-25 PROCEDURE — 84484 ASSAY OF TROPONIN QUANT: CPT | Performed by: EMERGENCY MEDICINE

## 2021-11-25 PROCEDURE — 93005 ELECTROCARDIOGRAM TRACING: CPT | Performed by: EMERGENCY MEDICINE

## 2021-11-25 PROCEDURE — 71275 CT ANGIOGRAPHY CHEST: CPT

## 2021-11-25 PROCEDURE — 250N000011 HC RX IP 250 OP 636: Performed by: EMERGENCY MEDICINE

## 2021-11-25 PROCEDURE — 250N000013 HC RX MED GY IP 250 OP 250 PS 637: Performed by: EMERGENCY MEDICINE

## 2021-11-25 PROCEDURE — 99285 EMERGENCY DEPT VISIT HI MDM: CPT | Mod: 25

## 2021-11-25 PROCEDURE — 83690 ASSAY OF LIPASE: CPT | Performed by: EMERGENCY MEDICINE

## 2021-11-25 PROCEDURE — 85004 AUTOMATED DIFF WBC COUNT: CPT | Performed by: EMERGENCY MEDICINE

## 2021-11-25 PROCEDURE — 36415 COLL VENOUS BLD VENIPUNCTURE: CPT | Performed by: EMERGENCY MEDICINE

## 2021-11-25 RX ORDER — OXYCODONE AND ACETAMINOPHEN 5; 325 MG/1; MG/1
1 TABLET ORAL ONCE
Status: COMPLETED | OUTPATIENT
Start: 2021-11-25 | End: 2021-11-25

## 2021-11-25 RX ORDER — CYCLOBENZAPRINE HCL 10 MG
10 TABLET ORAL 3 TIMES DAILY PRN
Qty: 20 TABLET | Refills: 0 | Status: ON HOLD | OUTPATIENT
Start: 2021-11-25 | End: 2022-01-17

## 2021-11-25 RX ORDER — IOPAMIDOL 755 MG/ML
75 INJECTION, SOLUTION INTRAVASCULAR ONCE
Status: COMPLETED | OUTPATIENT
Start: 2021-11-25 | End: 2021-11-25

## 2021-11-25 RX ORDER — CYCLOBENZAPRINE HCL 10 MG
10 TABLET ORAL ONCE
Status: COMPLETED | OUTPATIENT
Start: 2021-11-25 | End: 2021-11-25

## 2021-11-25 RX ADMIN — CYCLOBENZAPRINE 10 MG: 10 TABLET, FILM COATED ORAL at 18:39

## 2021-11-25 RX ADMIN — IOPAMIDOL 75 ML: 755 INJECTION, SOLUTION INTRAVENOUS at 18:50

## 2021-11-25 RX ADMIN — OXYCODONE HYDROCHLORIDE AND ACETAMINOPHEN 1 TABLET: 5; 325 TABLET ORAL at 17:19

## 2021-11-25 ASSESSMENT — ENCOUNTER SYMPTOMS
ARTHRALGIAS: 0
BLOOD IN STOOL: 0
SHORTNESS OF BREATH: 0
FEVER: 0
CHILLS: 0
DIARRHEA: 0
HEADACHES: 0
HEMATURIA: 0
CONFUSION: 0
ABDOMINAL PAIN: 0
NUMBNESS: 0
BACK PAIN: 1
LIGHT-HEADEDNESS: 0
SORE THROAT: 0
NAUSEA: 0
DIZZINESS: 0
COUGH: 0
DYSURIA: 0
VOMITING: 0

## 2021-11-25 ASSESSMENT — MIFFLIN-ST. JEOR: SCORE: 1250.24

## 2021-11-25 NOTE — ED PROVIDER NOTES
EMERGENCY DEPARTMENT ENCOUNTER      NAME: Evelyn Hudson  AGE: 50 year old female  YOB: 1970  MRN: [id number]  EVALUATION DATE & TIME: 2021  4:11 PM    PCP: Maribell Betancur        Chief Complaint   Patient presents with     Back Pain         FINAL IMPRESSION:  1. Acute left-sided thoracic back pain    2. Nodule of left lung          ED COURSE & MEDICAL DECISION MAKIN:00 PM I met with the patient, obtained history, performed an initial exam, and discussed options and plan for diagnostics and treatment here in the ED.  6:25 PM Per nurse, patient is still having muscle spasms. Will order flexeril   7:27 PM I reevaluated patient and dicussed plans for discharge. Patient was comfortable with plan.    Pertinent Labs & Imaging studies reviewed. (See chart for details)  50 year old female presents to the Emergency Department for evaluation of nontraumatic left-sided thoracic back pain     ED Course as of 21   Thu 2021   1707 Patient is here with nontraumatic left posterior thorax pain that is quite severe that is making her cry.  There is a history of thrombophilia.  Not currently anticoagulated.  History of alcoholism but currently sober.  History of rib fractures years ago but no recent trauma.  Bilateral breath sounds makes pneumothorax unlikely.  No radiation of pain therefore renal colic less likely.  No visible rash to suggest shingles   170 Differential includes PE, pneumonia, thoracic compression fracture, rib fracture, ACS, pulled muscle   170 Plan for Percocet, labs, EKG, imaging depending on labs   180 D-Dimer Quantitative(!): 0.53  Elevated plan for CT chest about her pulmonary emboli especially since she has thrombophilia    Troponin I: <0.01  ACS unlikely    Lipase: 52    Creatinine: 0.78   1848 Given Percocet and Flexeril for pain with improvement in symptoms    CT shows spiculated nodule with malignancy in the differential.  Discussed with  patient needs close follow-up.  No blood clot or pneumonia. CT bolus suboptial for PE, however based on YEARS criteria PE unlikley and repeat contrast imaging more likely to be harmful.    1923 No fracture seen on CT imaging.  Without evidence of pulmonary emboli or dissection.   1924 We will send home with Flexeril.  When attempting to order oxycodone for discharge a Epic warning popped up stating patient has history of opiate dependence or abuse in her chart which I did not see however will refrain from narcotics prescription since opiates should not be routinely used for back pain. Pt upset by this. I told pt I could not see where this Epic warning was coming from but does query other health systems.    1924 Recommend lidocaine patches in addition to Flexeril and I Profen and Tylenol and close follow-up with primary care doctor         At the conclusion of the encounter I discussed the results of all of the tests and the disposition. The questions were answered. The patient or family acknowledged understanding and was agreeable with the care plan.           MEDICATIONS GIVEN IN THE EMERGENCY:  Medications   oxyCODONE-acetaminophen (PERCOCET) 5-325 MG per tablet 1 tablet (1 tablet Oral Given 11/25/21 1719)   cyclobenzaprine (FLEXERIL) tablet 10 mg (10 mg Oral Given 11/25/21 1839)   iopamidol (ISOVUE-370) solution 75 mL (75 mLs Intravenous Given 11/25/21 1850)       NEW PRESCRIPTIONS STARTED AT TODAY'S ER VISIT  Discharge Medication List as of 11/25/2021  7:31 PM      START taking these medications    Details   cyclobenzaprine (FLEXERIL) 10 MG tablet Take 1 tablet (10 mg) by mouth 3 times daily as needed for muscle spasms, Disp-20 tablet, R-0, Local Print                  =================================================================    HPI    Patient information was obtained from: patient    Use of Intrepreter: N/A         Evelyn Hudson is a 50 year old female with a pertinent history of multiple rib  "fractures, HTN, alcoholism (1 year sober), who presents to this ED for evaluation of back pain.     A couple days ago patient started having left sided middle back pain that has escalated from baseline. She reports feeling numbness of both hands. She always has had back pain but never as severe as right now. Yesterday afternoon (11/24), patient was walking while carrying a purse, but her purse started feeling very heavy. Now patient states that it \"hurts to breath.\" Denies any chest pain or abdominal pain. No associated urinary symptoms like dysuria or hematuria. She denies any known relieving or provoking factors. Patient has been taking Aleve for pain control, without relief. Patient broke both of her ribs on two separate occasions about a year ago, secondary to fall. She denies any previous back s/p. Patient also is one year sober.   She reports chronic back pain and has been seeing a chiropractor since before age 9.  Does have history of tobacco abuse.    REVIEW OF SYSTEMS   Review of Systems   Constitutional: Negative for chills and fever.   HENT: Negative for congestion and sore throat.    Eyes: Negative for visual disturbance.   Respiratory: Negative for cough and shortness of breath.    Cardiovascular: Negative for chest pain.   Gastrointestinal: Negative for abdominal pain, blood in stool, diarrhea, nausea and vomiting.   Genitourinary: Negative for dysuria and hematuria. Urgency:          Musculoskeletal: Positive for back pain (left sided middle). Negative for arthralgias.   Skin: Negative for rash.   Neurological: Negative for dizziness, light-headedness, numbness and headaches.   Psychiatric/Behavioral: Negative for confusion.       PAST MEDICAL HISTORY:  Past Medical History:   Diagnosis Date     Acne      Alcohol withdrawal seizure (H)      Alcoholism /alcohol abuse (H)      Allergic rhinitis      Anxiety      Hypokalemia      Hypomagnesemia        PAST SURGICAL HISTORY:  Past Surgical History: "   Procedure Laterality Date     COLON SURGERY       INSERT INTRACORONARY STENT N/A 5/8/2017    Procedure: EXCISION BACK AND RIGHT ARM LIPOMA;  Surgeon: Rickey Anne MD;  Location: [facility] Main OR;  Service:      IR INTERCOSTAL STEROID INJECTION SINGLE LEVEL  4/30/2020     IR INTERCOSTAL STEROID INJECTION SINGLE LEVEL  4/30/2020     RECTAL POLYPECTOMY       TUBAL LIGATION Bilateral 6/3/2014    Procedure: BILATERAL LAPAROSCOPIC TUBAL LIGATION ;  Surgeon: Lorena Jiménez MD;  Location: [facility] Main OR;  Service:            CURRENT MEDICATIONS:    Discharge Medication List as of 11/25/2021  7:31 PM      START taking these medications    Details   cyclobenzaprine (FLEXERIL) 10 MG tablet Take 1 tablet (10 mg) by mouth 3 times daily as needed for muscle spasms, Disp-20 tablet, R-0, Local Print         CONTINUE these medications which have NOT CHANGED    Details   carboxymethylcellulose (REFRESH LIQUIGEL) 1 % ophthalmic solution [CARBOXYMETHYLCELLULOSE (REFRESH LIQUIGEL) 1 % OPHTHALMIC SOLUTION] Apply 1 drop to eye 2 (two) times a day as needed., Historical      escitalopram oxalate (LEXAPRO) 20 MG tablet [ESCITALOPRAM OXALATE (LEXAPRO) 20 MG TABLET] Take 20 mg by mouth daily., Historical      folic acid (FOLVITE) 1 MG tablet Take 1 tablet (1 mg) by mouth daily, Disp-30 tablet, R-0, E-Prescribe      glucosamine-chondroitin 500-400 mg cap Take 1 capsule by mouth daily as needed , Historical      hydrOXYzine (ATARAX) 25 MG tablet Take 1 tablet (25 mg) by mouth 3 times daily as needed for anxiety, Disp-60 tablet, R-0, E-Prescribe      Lactobacillus rhamnosus GG (CULTURELLE) 10-15 Billion cell capsule Take 1 capsule by mouth daily as needed , Historical      levETIRAcetam (KEPPRA) 500 MG tablet Take 1 tablet (500 mg) by mouth 2 times daily, Disp-60 tablet, R-0, Local Print      loratadine (CLARITIN) 10 MG tablet Take 10 mg by mouth daily as needed for allergies, Historical      multivitamin capsule [MULTIVITAMIN  CAPSULE] Take 1 capsule by mouth at bedtime. , Historical      naltrexone (DEPADE/REVIA) 50 MG tablet Take 1 tablet (50 mg) by mouth At Bedtime Take 1/2 tab WITH FOOD EACH NIGHT FOR 3 NIGHTS, THE TAKE 1 TAB EACH NIGHT., Disp-30 tablet, R-0, No Print Out      nicotine (NICODERM CQ) 21 MG/24HR 24 hr patch Place 1 patch onto the skin daily, Disp-28 patch, R-0, E-Prescribe      nicotine polacrilex (NICORETTE) 4 MG gum Place 4 mg inside cheek as needed , Historical      omeprazole (PRILOSEC) 20 MG capsule [OMEPRAZOLE (PRILOSEC) 20 MG CAPSULE] Take 1 capsule (20 mg total) by mouth 2 (two) times a day before meals., Disp-60 capsule, R-0, Normal      polyethylene glycol (MIRALAX) 17 gram packet [POLYETHYLENE GLYCOL (MIRALAX) 17 GRAM PACKET] Take 1 packet (17 g total) by mouth daily as needed., R-0, OTC      traZODone (DESYREL) 50 MG tablet Take  mg by mouth At Bedtime , Historical             ALLERGIES:  Allergies   Allergen Reactions     Ciprofloxacin Anaphylaxis     Throat swelling       FAMILY HISTORY:  No family history on file.    SOCIAL HISTORY:   Social History     Socioeconomic History     Marital status:      Spouse name: Not on file     Number of children: Not on file     Years of education: Not on file     Highest education level: Not on file   Occupational History     Not on file   Tobacco Use     Smoking status: Current Every Day Smoker     Packs/day: 1.00     Smokeless tobacco: Never Used   Substance and Sexual Activity     Alcohol use: No     Drug use: No     Sexual activity: Not on file   Other Topics Concern     Not on file   Social History Narrative     Not on file     Social Determinants of Health     Financial Resource Strain: Not on file   Food Insecurity: Not on file   Transportation Needs: Not on file   Physical Activity: Not on file   Stress: Not on file   Social Connections: Not on file   Intimate Partner Violence: Not on file   Housing Stability: Not on file       VITALS:  Patient  "Vitals for the past 24 hrs:   BP Temp Temp src Pulse Resp SpO2 Height Weight   11/25/21 1942 (!) 146/92 -- -- 96 18 100 % -- --   11/25/21 1605 130/84 98.1  F (36.7  C) Oral 103 22 100 % 1.664 m (5' 5.5\") 62.1 kg (137 lb)       PHYSICAL EXAM      Vitals: BP (!) 146/92   Pulse 96   Temp 98.1  F (36.7  C) (Oral)   Resp 18   Ht 1.664 m (5' 5.5\")   Wt 62.1 kg (137 lb)   SpO2 100%   BMI 22.45 kg/m    General:  Visibly uncomfortable appearing, leaning forward in pain, tearful. awake, alert, interactive.  Eyes: Conjunctivae non-injected. Sclera anicteric.  HENT: Atraumatic.  Neck: Supple.  Respiratory/Chest: Bilateral breath sounds. Respiration unlabored.  Heart: RRR  Abdomen: Soft. non distended  Musculoskeletal:  Tenderness to left rib cage, posteriorly. Normal extremities. No edema or erythema.  Back: tender to left posterior thoracic back with focal spasm. No midline tenderness. No rash.   Skin: Normal color. No rash or diaphoresis.  Neurologic: Face symmetric, moves all extremities spontaneously. Speech clear.  Psychiatric: Oriented to person, place, and time. Affect appropriate.    LAB:  All pertinent labs reviewed and interpreted.  Results for orders placed or performed during the hospital encounter of 11/25/21   CT Chest Pulmonary Embolism w Contrast    Impression    IMPRESSION:  1.  Indeterminate 5 x 7 mm spiculated centrally cavitary nodule in left upper lobe. Prior would be most helpful, if none, recommend follow-up in 3 months to ensure stability as malignant neoplasm would be in the differential. Underlying emphysema.  2.  Timing of the contrast bolus suboptimal to evaluate for pulmonary emboli. No large or central emboli identified. If indicated repeat would be helpful. No aortic aneurysm.    NOTE: ABNORMAL REPORT    THE DICTATION ABOVE DESCRIBES AN ABNORMALITY FOR WHICH FOLLOW-UP IS NEEDED.    CT Thoracic Spine w/o Contrast    Impression    IMPRESSION:  1.  Normal CT thoracic spine.     D dimer " quantitative   Result Value Ref Range    D-Dimer Quantitative 0.53 (H) 0.00 - 0.50 ug/mL FEU   Result Value Ref Range    Lipase 52 0 - 52 U/L   Troponin I (now)   Result Value Ref Range    Troponin I <0.01 0.00 - 0.29 ng/mL   Basic metabolic panel   Result Value Ref Range    Sodium 139 136 - 145 mmol/L    Potassium 4.0 3.5 - 5.0 mmol/L    Chloride 101 98 - 107 mmol/L    Carbon Dioxide (CO2) 27 22 - 31 mmol/L    Anion Gap 11 5 - 18 mmol/L    Urea Nitrogen 13 8 - 22 mg/dL    Creatinine 0.78 0.60 - 1.10 mg/dL    Calcium 10.0 8.5 - 10.5 mg/dL    Glucose 82 70 - 125 mg/dL    GFR Estimate 89 >60 mL/min/1.73m2   CBC with platelets and differential   Result Value Ref Range    WBC Count 11.4 (H) 4.0 - 11.0 10e3/uL    RBC Count 4.10 3.80 - 5.20 10e6/uL    Hemoglobin 13.3 11.7 - 15.7 g/dL    Hematocrit 39.1 35.0 - 47.0 %    MCV 95 78 - 100 fL    MCH 32.4 26.5 - 33.0 pg    MCHC 34.0 31.5 - 36.5 g/dL    RDW 12.2 10.0 - 15.0 %    Platelet Count 363 150 - 450 10e3/uL    % Neutrophils 61 %    % Lymphocytes 23 %    % Monocytes 10 %    % Eosinophils 5 %    % Basophils 1 %    % Immature Granulocytes 0 %    NRBCs per 100 WBC 0 <1 /100    Absolute Neutrophils 7.1 1.6 - 8.3 10e3/uL    Absolute Lymphocytes 2.6 0.8 - 5.3 10e3/uL    Absolute Monocytes 1.1 0.0 - 1.3 10e3/uL    Absolute Eosinophils 0.6 0.0 - 0.7 10e3/uL    Absolute Basophils 0.1 0.0 - 0.2 10e3/uL    Absolute Immature Granulocytes 0.0 <=0.0 10e3/uL    Absolute NRBCs 0.0 10e3/uL       RADIOLOGY:  Reviewed all pertinent imaging. Please see official radiology report.  CT Chest Pulmonary Embolism w Contrast   Final Result   IMPRESSION:   1.  Indeterminate 5 x 7 mm spiculated centrally cavitary nodule in left upper lobe. Prior would be most helpful, if none, recommend follow-up in 3 months to ensure stability as malignant neoplasm would be in the differential. Underlying emphysema.   2.  Timing of the contrast bolus suboptimal to evaluate for pulmonary emboli. No large or central  emboli identified. If indicated repeat would be helpful. No aortic aneurysm.      NOTE: ABNORMAL REPORT      THE DICTATION ABOVE DESCRIBES AN ABNORMALITY FOR WHICH FOLLOW-UP IS NEEDED.       CT Thoracic Spine w/o Contrast   Final Result   IMPRESSION:   1.  Normal CT thoracic spine.             EKG:    Performed at: 25-NOV-2021  17:16:49    Impression:  Normal sinus rhythm.     Rate: 84  BPM  Rhythm:  Sinus rhythm.   Axis: 81  AR Interval: 128 ms  QRS Interval: 90 ms  QTc Interval: 467 ms   ST Changes: No ST Deviations.   Comparison: None available for comparison.     I have independently reviewed and interpreted the EKG(s) documented above.    PROCEDURES:   None.       I, Sara Behmanesh , am serving as a scribe to document services personally performed by Dr. Harp based on my observation and the provider's statements to me. I, Christiano Harp MD attest that Sara Behmanesh is acting in a scribe capacity, has observed my performance of the services and has documented them in accordance with my direction.    Christiano Harp M.D.  Emergency Medicine  [facility] EMERGENCY ROOM  [facility] [facility] [address]  [phone]  Dept: [phone]     Jamel Harp MD  11/25/21 1952       Jamel Harp MD  11/25/21 2000

## 2021-11-25 NOTE — ED TRIAGE NOTES
Began having right side mid back pain last night.  Has progressively gotten worse and now unbearable. Has been applying Ice and took aleve without relief. No UTI symptoms. States pain is radiating everywhere and states hands feel numb.  Scheduled for abd surgery in January, something to do with her colon. Slow to amubulate

## 2021-11-26 NOTE — DISCHARGE INSTRUCTIONS
As we discussed you need to follow-up with your primary care doctor for repeat imaging of your lungs to make sure this spiculated nodule is not getting larger which would suggest a possible cancer.  This is unlikely the cause of your pain.    Recommend Flexeril twice a day.  Recommend lidocaine patches available over the counter.  Recommend ibuprofen and Tylenol in addition.

## 2021-12-01 ENCOUNTER — LAB REQUISITION (OUTPATIENT)
Dept: LAB | Facility: CLINIC | Age: 51
End: 2021-12-01

## 2021-12-01 DIAGNOSIS — L65.9 NONSCARRING HAIR LOSS, UNSPECIFIED: ICD-10-CM

## 2021-12-01 LAB — TSH SERPL DL<=0.005 MIU/L-ACNC: 0.97 UIU/ML (ref 0.3–5)

## 2021-12-01 PROCEDURE — 82670 ASSAY OF TOTAL ESTRADIOL: CPT | Performed by: NURSE PRACTITIONER

## 2021-12-01 PROCEDURE — 84443 ASSAY THYROID STIM HORMONE: CPT | Performed by: NURSE PRACTITIONER

## 2021-12-01 PROCEDURE — 84403 ASSAY OF TOTAL TESTOSTERONE: CPT | Performed by: NURSE PRACTITIONER

## 2021-12-02 LAB — ESTRADIOL SERPL-MCNC: <10 PG/ML

## 2021-12-03 LAB — TESTOST SERPL-MCNC: 14 NG/DL (ref 12–36)

## 2021-12-05 ENCOUNTER — HEALTH MAINTENANCE LETTER (OUTPATIENT)
Age: 51
End: 2021-12-05

## 2021-12-07 DIAGNOSIS — Z11.59 ENCOUNTER FOR SCREENING FOR OTHER VIRAL DISEASES: ICD-10-CM

## 2021-12-12 LAB
ATRIAL RATE - MUSE: 84 BPM
DIASTOLIC BLOOD PRESSURE - MUSE: NORMAL MMHG
INTERPRETATION ECG - MUSE: NORMAL
P AXIS - MUSE: 73 DEGREES
PR INTERVAL - MUSE: 128 MS
QRS DURATION - MUSE: 90 MS
QT - MUSE: 396 MS
QTC - MUSE: 467 MS
R AXIS - MUSE: 81 DEGREES
SYSTOLIC BLOOD PRESSURE - MUSE: NORMAL MMHG
T AXIS - MUSE: 67 DEGREES
VENTRICULAR RATE- MUSE: 84 BPM

## 2021-12-14 ENCOUNTER — OFFICE VISIT (OUTPATIENT)
Dept: NEUROLOGY | Facility: CLINIC | Age: 51
End: 2021-12-14
Attending: EMERGENCY MEDICINE
Payer: COMMERCIAL

## 2021-12-14 VITALS
WEIGHT: 142.8 LBS | SYSTOLIC BLOOD PRESSURE: 122 MMHG | HEART RATE: 105 BPM | BODY MASS INDEX: 23.4 KG/M2 | DIASTOLIC BLOOD PRESSURE: 84 MMHG

## 2021-12-14 DIAGNOSIS — G40.89 OTHER SEIZURES (H): ICD-10-CM

## 2021-12-14 PROCEDURE — 99205 OFFICE O/P NEW HI 60 MIN: CPT | Performed by: PSYCHIATRY & NEUROLOGY
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
## 2021-12-14 NOTE — NURSING NOTE
Chief Complaint   Patient presents with     Seizures     overall, feels good       PhoJOS Martinez on 12/14/2021 at 9:34 AM

## 2021-12-17 ENCOUNTER — LAB REQUISITION (OUTPATIENT)
Dept: LAB | Facility: CLINIC | Age: 51
End: 2021-12-17

## 2021-12-17 DIAGNOSIS — R21 RASH AND OTHER NONSPECIFIC SKIN ERUPTION: ICD-10-CM

## 2021-12-17 PROCEDURE — 87205 SMEAR GRAM STAIN: CPT | Performed by: STUDENT IN AN ORGANIZED HEALTH CARE EDUCATION/TRAINING PROGRAM

## 2021-12-20 LAB
BACTERIA SPEC CULT: NORMAL
GRAM STAIN RESULT: NORMAL
GRAM STAIN RESULT: NORMAL

## 2021-12-22 ENCOUNTER — APPOINTMENT (OUTPATIENT)
Dept: RADIOLOGY | Facility: CLINIC | Age: 51
End: 2021-12-22
Attending: EMERGENCY MEDICINE
Payer: COMMERCIAL

## 2021-12-22 ENCOUNTER — HOSPITAL ENCOUNTER (EMERGENCY)
Facility: CLINIC | Age: 51
Discharge: HOME | End: 2021-12-22
Admitting: EMERGENCY MEDICINE
Payer: COMMERCIAL

## 2021-12-22 VITALS
DIASTOLIC BLOOD PRESSURE: 103 MMHG | BODY MASS INDEX: 22.5 KG/M2 | OXYGEN SATURATION: 98 % | RESPIRATION RATE: 20 BRPM | SYSTOLIC BLOOD PRESSURE: 131 MMHG | WEIGHT: 140 LBS | HEIGHT: 66 IN

## 2021-12-22 DIAGNOSIS — Z53.21 PATIENT LEFT WITHOUT BEING SEEN: ICD-10-CM

## 2021-12-22 LAB
ALBUMIN SERPL-MCNC: 4 G/DL (ref 3.5–5)
ALP SERPL-CCNC: 147 U/L (ref 45–120)
ALT SERPL W P-5'-P-CCNC: 13 U/L (ref 0–45)
ANION GAP SERPL CALCULATED.3IONS-SCNC: 12 MMOL/L (ref 5–18)
APTT PPP: 35 SECONDS (ref 22–38)
AST SERPL W P-5'-P-CCNC: 27 U/L (ref 0–40)
BILIRUB SERPL-MCNC: 0.4 MG/DL (ref 0–1)
BUN SERPL-MCNC: 7 MG/DL (ref 8–22)
CALCIUM SERPL-MCNC: 9.3 MG/DL (ref 8.5–10.5)
CHLORIDE BLD-SCNC: 99 MMOL/L (ref 98–107)
CO2 SERPL-SCNC: 25 MMOL/L (ref 22–31)
CREAT SERPL-MCNC: 0.62 MG/DL (ref 0.6–1.1)
ERYTHROCYTE [DISTWIDTH] IN BLOOD BY AUTOMATED COUNT: 12.5 % (ref 10–15)
GFR SERPL CREATININE-BSD FRML MDRD: >90 ML/MIN/1.73M2
GLUCOSE BLD-MCNC: 90 MG/DL (ref 70–125)
HCT VFR BLD AUTO: 39.1 % (ref 35–47)
HGB BLD-MCNC: 13.9 G/DL (ref 11.7–15.7)
INR PPP: 0.98 (ref 0.85–1.15)
MCH RBC QN AUTO: 32.3 PG (ref 26.5–33)
MCHC RBC AUTO-ENTMCNC: 35.5 G/DL (ref 31.5–36.5)
MCV RBC AUTO: 91 FL (ref 78–100)
PLATELET # BLD AUTO: 286 10E3/UL (ref 150–450)
POTASSIUM BLD-SCNC: 3.7 MMOL/L (ref 3.5–5)
PROT SERPL-MCNC: 7.8 G/DL (ref 6–8)
RBC # BLD AUTO: 4.31 10E6/UL (ref 3.8–5.2)
SODIUM SERPL-SCNC: 136 MMOL/L (ref 136–145)
WBC # BLD AUTO: 7.7 10E3/UL (ref 4–11)

## 2021-12-22 PROCEDURE — 85027 COMPLETE CBC AUTOMATED: CPT | Performed by: EMERGENCY MEDICINE

## 2021-12-22 PROCEDURE — 999N000104 HC STATISTIC NO CHARGE

## 2021-12-22 PROCEDURE — 36415 COLL VENOUS BLD VENIPUNCTURE: CPT | Performed by: EMERGENCY MEDICINE

## 2021-12-22 PROCEDURE — 73060 X-RAY EXAM OF HUMERUS: CPT | Mod: RT

## 2021-12-22 PROCEDURE — 85730 THROMBOPLASTIN TIME PARTIAL: CPT | Performed by: EMERGENCY MEDICINE

## 2021-12-22 PROCEDURE — 85610 PROTHROMBIN TIME: CPT | Performed by: EMERGENCY MEDICINE

## 2021-12-22 PROCEDURE — 80053 COMPREHEN METABOLIC PANEL: CPT | Performed by: EMERGENCY MEDICINE

## 2021-12-22 ASSESSMENT — MIFFLIN-ST. JEOR: SCORE: 1263.85

## 2021-12-22 NOTE — ED TRIAGE NOTES
Patient states her right arm has bruising, noticed Saturday afternoon, she states she jumped out of bed and slipped, doesn't think she landed on arm.  Went to chiropractor and came here.  Pain in distal upper arm. No blood thinners. Patient is everyday drinker, states last drink 1 hour ago, seizures in the past.  Poor historian.

## 2021-12-22 NOTE — ED PROVIDER NOTES
Without any nursing staff being made aware, this patient was apparently left without being seen from triage.  I had initially put in orders in order to get things moving on the patient in triage but had not yet seen the patient.  She called and the nursing staff asked me how to proceed with the phone call because there were some results that needed follow-up with the patient was no longer here.  The for that reason I did speak with her briefly on the phone.  I did inform her of the rib fractures but that we are not able to evaluate the lungs which is what we would really want to do if there were no rib fractures and asked if she had any pain.  She tells me that she has no rib pain and that probably is an old injury for her but that she was mostly concerned with her arm.  Her arm x-ray was reviewed and I did tell her that there is no humeral fracture.  Strangely, she was upset by that and stated that the whole experience was a nightmare and was not planning to return for further evaluation as part of the concerns brought up apparently by the outside clinic where the amount of bruising.  Her lab work is unremarkable but I did not ever see the patient in order to do an evaluation so I did advise that she please follow-up with her primary care clinic if she does not feel that she would like to come back in here to finish her evaluation today.    lwbs from triage.  No diagnosis is able to be made as the patient was never seen as an ER patient by myself.     Allison Moe MD  12/22/21 9703

## 2021-12-23 ENCOUNTER — LAB REQUISITION (OUTPATIENT)
Dept: LAB | Facility: CLINIC | Age: 51
End: 2021-12-23

## 2021-12-23 DIAGNOSIS — Z01.818 ENCOUNTER FOR OTHER PREPROCEDURAL EXAMINATION: ICD-10-CM

## 2021-12-23 LAB
ALBUMIN SERPL-MCNC: 4.2 G/DL (ref 3.5–5)
ALP SERPL-CCNC: 153 U/L (ref 45–120)
ALT SERPL W P-5'-P-CCNC: 18 U/L (ref 0–45)
ANION GAP SERPL CALCULATED.3IONS-SCNC: 17 MMOL/L (ref 5–18)
AST SERPL W P-5'-P-CCNC: 33 U/L (ref 0–40)
BILIRUB SERPL-MCNC: 0.4 MG/DL (ref 0–1)
BUN SERPL-MCNC: 7 MG/DL (ref 8–22)
CALCIUM SERPL-MCNC: 9.6 MG/DL (ref 8.5–10.5)
CHLORIDE BLD-SCNC: 102 MMOL/L (ref 98–107)
CO2 SERPL-SCNC: 21 MMOL/L (ref 22–31)
CREAT SERPL-MCNC: 0.63 MG/DL (ref 0.6–1.1)
GFR SERPL CREATININE-BSD FRML MDRD: >90 ML/MIN/1.73M2
GLUCOSE BLD-MCNC: 88 MG/DL (ref 70–125)
POTASSIUM BLD-SCNC: 3.8 MMOL/L (ref 3.5–5)
PROT SERPL-MCNC: 7.7 G/DL (ref 6–8)
SODIUM SERPL-SCNC: 140 MMOL/L (ref 136–145)

## 2021-12-23 PROCEDURE — 82040 ASSAY OF SERUM ALBUMIN: CPT | Performed by: NURSE PRACTITIONER

## 2021-12-29 ENCOUNTER — HOSPITAL ENCOUNTER (EMERGENCY)
Facility: CLINIC | Age: 51
Discharge: HOME | End: 2021-12-29
Attending: EMERGENCY MEDICINE | Admitting: EMERGENCY MEDICINE
Payer: COMMERCIAL

## 2021-12-29 VITALS
WEIGHT: 140 LBS | SYSTOLIC BLOOD PRESSURE: 138 MMHG | RESPIRATION RATE: 18 BRPM | OXYGEN SATURATION: 100 % | HEART RATE: 97 BPM | TEMPERATURE: 98.3 F | BODY MASS INDEX: 22.94 KG/M2 | DIASTOLIC BLOOD PRESSURE: 92 MMHG

## 2021-12-29 DIAGNOSIS — S40.021A HEMATOMA OF ARM, RIGHT, INITIAL ENCOUNTER: ICD-10-CM

## 2021-12-29 DIAGNOSIS — F10.10 ALCOHOL ABUSE: ICD-10-CM

## 2021-12-29 PROCEDURE — 99283 EMERGENCY DEPT VISIT LOW MDM: CPT

## 2021-12-29 RX ORDER — DIAZEPAM 5 MG
5 TABLET ORAL EVERY 6 HOURS PRN
Qty: 10 TABLET | Refills: 0 | Status: ON HOLD | OUTPATIENT
Start: 2021-12-29 | End: 2022-01-17

## 2021-12-30 NOTE — ED PROVIDER NOTES
EMERGENCY DEPARTMENT ENCOUNTER      NAME: Evelyn Hudson  AGE: 50 year old female  YOB: 1970  MRN: [id number]  EVALUATION DATE & TIME: 12/29/2021  9:13 PM    PCP: Maribell Betancur    ED PROVIDER: Joy Bach MD    Chief Complaint   Patient presents with     Arm Pain         FINAL IMPRESSION:  1. Hematoma of arm, right, initial encounter    2. Alcohol abuse          ED COURSE & MEDICAL DECISION MAKING:    Pertinent Labs & Imaging studies reviewed. (See chart for details)  50 year old female with history of pain to a hematoma in her right arm that is not resolving after a fall recently.  On examination patient has ecchymosis to the right upper extremity and a golf ball sized palpable hematoma.  There is no evidence of erythema swelling warmth or secondary infection.  Patient counseled that it will take often times several weeks for the hematoma to resolve.  Can alternate ice and heat which may help this break up faster.  Additionally she admits to drinking, and is concerned for withdrawal having previous withdrawal seizures.  She is not tachycardic tremulous, etc. and does not warrant emergent evaluation for alcohol withdrawal having admittedly just drink today.  Offered to go to detox but declines, wanting to go home instead.  Discharged home with small prescription for Valium for any withdrawal symptoms.      ED Course as of 12/30/21 0109   Wed Dec 29, 2021   2140 I met with the patient to gather history and to perform my initial exam. I discussed the plan for care while in the Emergency Department. PPE (gloves, eye protection, surgical cap, N95 mask, and surgical mask) was worn by me during patient encounters while patient wore mask.    2146 I re-evaluated and updated patient. Offered detox but patient declined. Will discharge with a small prescription for valium.           At the conclusion of the encounter I discussed the results of all of the tests and the disposition. The questions were  answered. The patient or family acknowledged understanding and was agreeable with the care plan.      MEDICATIONS GIVEN IN THE EMERGENCY:  Medications - No data to display    NEW PRESCRIPTIONS STARTED AT TODAY'S ER VISIT  Discharge Medication List as of 12/29/2021  9:52 PM      START taking these medications    Details   diazepam (VALIUM) 5 MG tablet Take 1 tablet (5 mg) by mouth every 6 hours as needed for withdrawal, Disp-10 tablet, R-0, Local Print                =================================================================    HPI    Patient information was obtained from: Patient    Use of : N/A        Evelyn Hudson is a 50 year old female with pertinent medical history of alcohol abuse, HTN, anxiety, who presents for evaluation of right arm pain.    Patient reports with concerned for swelling and bruising to her right upper extremity near her inner elbow and additional bruises to right hand and right upper extremity. She states she sustained the injury several weeks ago after a fall. She additionally notes occasionally having some bleeding from a bad tooth when she brushes her teeth. Per chart review, patient had blood drawn 7 days ago with normal platelet count of 286. Patient denies additional medical concerns or complaints at this time.      Patient requests alcohol detox and reports she has a history of withdrawal seizures. Patient has been drinking today. She does not have any visible tremors.    REVIEW OF SYSTEMS  Constitutional:  Denies fever, chills, weight loss or weakness  Cardiovascular:  No CP, palpitations  GI:  Denies abdominal pain, nausea, vomiting, diarrhea  Musculoskeletal:  Denies any new muscle/joint pain, swelling or loss of function.  Skin: Positive for golf ball sized hematoma to right upper extremity near inner elbow and bruising to right upper extremity and right hand. Denies rash, pallor  Neurologic:  Denies headache, tremors, focal weakness or sensory changes  All  other systems negative unless noted in HPI.      PAST MEDICAL HISTORY:  Past Medical History:   Diagnosis Date     Acne      Alcohol withdrawal seizure (H)      Alcoholism /alcohol abuse      Allergic rhinitis      Anxiety      Hypokalemia      Hypomagnesemia        PAST SURGICAL HISTORY:  Past Surgical History:   Procedure Laterality Date     COLON SURGERY       INSERT INTRACORONARY STENT N/A 5/8/2017    Procedure: EXCISION BACK AND RIGHT ARM LIPOMA;  Surgeon: Rickey Anne MD;  Location: [facility];  Service:      IR INTERCOSTAL STEROID INJECTION SINGLE LEVEL  4/30/2020     IR INTERCOSTAL STEROID INJECTION SINGLE LEVEL  4/30/2020     RECTAL POLYPECTOMY       TUBAL LIGATION Bilateral 6/3/2014    Procedure: BILATERAL LAPAROSCOPIC TUBAL LIGATION ;  Surgeon: Lorena Jiménez MD;  Location: [facility];  Service:        CURRENT MEDICATIONS:    Prior to Admission Medications   Prescriptions Last Dose Informant Patient Reported? Taking?   Lactobacillus rhamnosus GG (CULTURELLE) 10-15 Billion cell capsule   Yes No   Sig: Take 1 capsule by mouth daily as needed    carboxymethylcellulose (REFRESH LIQUIGEL) 1 % ophthalmic solution   Yes No   Sig: [CARBOXYMETHYLCELLULOSE (REFRESH LIQUIGEL) 1 % OPHTHALMIC SOLUTION] Apply 1 drop to eye 2 (two) times a day as needed.   cyclobenzaprine (FLEXERIL) 10 MG tablet   No No   Sig: Take 1 tablet (10 mg) by mouth 3 times daily as needed for muscle spasms   escitalopram oxalate (LEXAPRO) 20 MG tablet   Yes No   Sig: [ESCITALOPRAM OXALATE (LEXAPRO) 20 MG TABLET] Take 20 mg by mouth daily.   folic acid (FOLVITE) 1 MG tablet   No No   Sig: Take 1 tablet (1 mg) by mouth daily   glucosamine-chondroitin 500-400 mg cap   Yes No   Sig: Take 1 capsule by mouth daily as needed    hydrOXYzine (ATARAX) 25 MG tablet   No No   Sig: Take 1 tablet (25 mg) by mouth 3 times daily as needed for anxiety   levETIRAcetam (KEPPRA) 500 MG tablet   No No   Sig: Take 1 tablet (500 mg) by mouth 2  times daily   loratadine (CLARITIN) 10 MG tablet   Yes No   Sig: Take 10 mg by mouth daily as needed for allergies   multivitamin capsule   Yes No   Sig: [MULTIVITAMIN CAPSULE] Take 1 capsule by mouth at bedtime.    naltrexone (DEPADE/REVIA) 50 MG tablet   No No   Sig: Take 1 tablet (50 mg) by mouth At Bedtime Take 1/2 tab WITH FOOD EACH NIGHT FOR 3 NIGHTS, THE TAKE 1 TAB EACH NIGHT.   nicotine (NICODERM CQ) 21 MG/24HR 24 hr patch   No No   Sig: Place 1 patch onto the skin daily   Patient not taking: Reported on 12/14/2021   nicotine polacrilex (NICORETTE) 4 MG gum   Yes No   Sig: Place 4 mg inside cheek as needed    Patient not taking: Reported on 12/14/2021   omeprazole (PRILOSEC) 20 MG capsule   No No   Sig: [OMEPRAZOLE (PRILOSEC) 20 MG CAPSULE] Take 1 capsule (20 mg total) by mouth 2 (two) times a day before meals.   Patient not taking: Reported on 12/14/2021   polyethylene glycol (MIRALAX) 17 gram packet   No No   Sig: [POLYETHYLENE GLYCOL (MIRALAX) 17 GRAM PACKET] Take 1 packet (17 g total) by mouth daily as needed.   Patient not taking: Reported on 12/14/2021   traZODone (DESYREL) 50 MG tablet   Yes No   Sig: Take  mg by mouth At Bedtime       Facility-Administered Medications: None       ALLERGIES:  Allergies   Allergen Reactions     Ciprofloxacin Anaphylaxis     Throat swelling       FAMILY HISTORY:  History reviewed. No pertinent family history.    SOCIAL HISTORY:  Social History     Tobacco Use     Smoking status: Current Every Day Smoker     Packs/day: 1.00     Smokeless tobacco: Never Used   Substance Use Topics     Alcohol use: No     Drug use: No        VITALS:  Patient Vitals for the past 24 hrs:   BP Temp Temp src Pulse Resp SpO2 Weight   12/29/21 2155 (!) 138/92 -- -- 97 18 100 % --   12/29/21 2122 (!) 147/85 -- -- 98 -- 99 % --   12/29/21 1940 (!) 149/98 98.3  F (36.8  C) Oral 110 19 96 % 63.5 kg (140 lb)       PHYSICAL EXAM    General Appearance: Well-appearing, well-nourished, no acute  distress   Head:  Normocephalic  Eyes:  conjunctiva/corneas clear  ENT:  membranes are moist without pallor  Neck:  Supple  Cardio:  Regular rate and rhythm  Pulm:  No respiratory distress  Extremities: Moves all extremities normally, normal gait  Skin: Old appearing ecchymosis to right upper extremity with golf ball sized hematoma to medial biceps area. Skin warm, dry, no rashes  Neuro:  Alert and oriented ×3, moving all extremities, no gross sensory defects. No tremors     The creation of this record is based on the scribe s observations of the work being performed by Joy Bach MD and the provider s statements to them. It was created on his behalf by Criss Kirkland, a trained medical scribe. This document has been checked and approved by the attending provider.    Joy Bach MD  Emergency Medicine  [facility] EMERGENCY ROOM  [address], [place] [id number] [phone]  Dept: [phone]     Joy Bach MD  12/30/21 0109

## 2021-12-30 NOTE — DISCHARGE INSTRUCTIONS
The x-rays that you had previously of your arm were negative for any fracture in the arm.  On exam you have bruising with an associated hematoma.  That said a golf ball sized area that is hardened.  This is a blood collection underneath the soft tissue.  This is not a blood clot in the vein, but rather clotted blood within the soft tissue.  Alternating ice and heat to this area can help your body reabsorb the area of hematoma faster, but ultimately this takes time, often several weeks to go away.    Substance Use Treatment (Rule 25) Information -     To access chemical dependency treatment you must have an assessment complete or have an updatedassessment within 30 days of starting any program.    Information for this to be completed and to secure funding if you have medical assistance or no insurance can be found through your [facility]'s chemical health intakeline.     If you have private insurance contact the customer service number on the back of your insurance card to determine the required steps for accessingservices through your insurance provider.     [facility] RULE 25 INFORMATION -   [place] - [phone]  [place] - [phone]  [facility] [phone]  [phone]  [facility] [phone]  [facility] [phone]  [place] - [phone]  [facility] [phone]    Once approved for funding you canconnect with a facility that does Rule 25 assessment.     The following facilities offer Rule 25 assessments -     [facility]  [phone]  [facility]   [address] [place] [address]    [facility] Drug Abuse [facility] ([facility])  [phone]  [address] [place] [address]  *Walk in assessments also available M-F starting at 8 am.     [facility]   [phone]  [facility]  [address] [facility] [facility] [address]    [facility]   [phone] [address] [place] [address]    [facility]  [phone] - press [facility]   [phone]  [address]  [facility]    [facility]  [phone]  [address] [id number]    [facility]  [phone]  [address] [facility] [address]    If youare intoxicated  You may be required to detox at a detox facility before starting treatment.    [facility]- [address] [place] [address].  [place], [place].    [phone]    [facility]: [phone]  [facility]: [phone]  [facility]: [phone]  [place]     *All information subject to change by facility.

## 2021-12-30 NOTE — ED NOTES
Pt has two masses on her R arm, one above the elbow and one on her wrist. Pt states that they are not painful, just bothersome with her broken ribs.

## 2021-12-30 NOTE — ED TRIAGE NOTES
Pt arrives with c/o hematoma to upper arm from a fall a few weeks ago that has not has not resolved. Pt has been drinking but main concern is the hematoma.

## 2022-01-12 ENCOUNTER — HOSPITAL ENCOUNTER (INPATIENT)
Facility: CLINIC | Age: 52
LOS: 4 days | Discharge: HOME | DRG: 897 | End: 2022-01-17
Attending: EMERGENCY MEDICINE | Admitting: PSYCHIATRY & NEUROLOGY
Payer: COMMERCIAL

## 2022-01-12 DIAGNOSIS — F33.41 RECURRENT MAJOR DEPRESSIVE DISORDER, IN PARTIAL REMISSION (H): ICD-10-CM

## 2022-01-12 DIAGNOSIS — R56.9 ALCOHOL WITHDRAWAL SEIZURE WITHOUT COMPLICATION (H): ICD-10-CM

## 2022-01-12 DIAGNOSIS — Z20.822 CONTACT WITH AND (SUSPECTED) EXPOSURE TO COVID-19: ICD-10-CM

## 2022-01-12 DIAGNOSIS — M62.838 MUSCLE SPASM: Primary | ICD-10-CM

## 2022-01-12 DIAGNOSIS — F10.930 ALCOHOL WITHDRAWAL SYNDROME WITHOUT COMPLICATION (H): ICD-10-CM

## 2022-01-12 DIAGNOSIS — F10.930 ALCOHOL WITHDRAWAL SEIZURE WITHOUT COMPLICATION (H): ICD-10-CM

## 2022-01-12 LAB
ALBUMIN SERPL-MCNC: 3.8 G/DL (ref 3.4–5)
ALCOHOL BREATH TEST: 0.28 (ref 0–0.01)
ALP SERPL-CCNC: 188 U/L (ref 40–150)
ALT SERPL W P-5'-P-CCNC: 42 U/L (ref 0–50)
AMPHETAMINES UR QL SCN: ABNORMAL
ANION GAP SERPL CALCULATED.3IONS-SCNC: 7 MMOL/L (ref 3–14)
AST SERPL W P-5'-P-CCNC: 53 U/L (ref 0–45)
BARBITURATES UR QL: ABNORMAL
BASOPHILS # BLD AUTO: 0.1 10E3/UL (ref 0–0.2)
BASOPHILS NFR BLD AUTO: 2 %
BENZODIAZ UR QL: ABNORMAL
BILIRUB SERPL-MCNC: 0.2 MG/DL (ref 0.2–1.3)
BUN SERPL-MCNC: 6 MG/DL (ref 7–30)
CALCIUM SERPL-MCNC: 9.1 MG/DL (ref 8.5–10.1)
CANNABINOIDS UR QL SCN: ABNORMAL
CHLORIDE BLD-SCNC: 97 MMOL/L (ref 94–109)
CO2 SERPL-SCNC: 29 MMOL/L (ref 20–32)
COCAINE UR QL: ABNORMAL
CREAT SERPL-MCNC: 0.57 MG/DL (ref 0.52–1.04)
EOSINOPHIL # BLD AUTO: 0.1 10E3/UL (ref 0–0.7)
EOSINOPHIL NFR BLD AUTO: 1 %
ERYTHROCYTE [DISTWIDTH] IN BLOOD BY AUTOMATED COUNT: 15.7 % (ref 10–15)
GFR SERPL CREATININE-BSD FRML MDRD: >90 ML/MIN/1.73M2
GLUCOSE BLD-MCNC: 132 MG/DL (ref 70–99)
HCG UR QL: NEGATIVE
HCT VFR BLD AUTO: 41.1 % (ref 35–47)
HGB BLD-MCNC: 14.3 G/DL (ref 11.7–15.7)
IMM GRANULOCYTES # BLD: 0 10E3/UL
IMM GRANULOCYTES NFR BLD: 0 %
LYMPHOCYTES # BLD AUTO: 2.4 10E3/UL (ref 0.8–5.3)
LYMPHOCYTES NFR BLD AUTO: 52 %
MCH RBC QN AUTO: 33.3 PG (ref 26.5–33)
MCHC RBC AUTO-ENTMCNC: 34.8 G/DL (ref 31.5–36.5)
MCV RBC AUTO: 96 FL (ref 78–100)
MONOCYTES # BLD AUTO: 0.4 10E3/UL (ref 0–1.3)
MONOCYTES NFR BLD AUTO: 9 %
NEUTROPHILS # BLD AUTO: 1.7 10E3/UL (ref 1.6–8.3)
NEUTROPHILS NFR BLD AUTO: 36 %
NRBC # BLD AUTO: 0 10E3/UL
NRBC BLD AUTO-RTO: 0 /100
OPIATES UR QL SCN: ABNORMAL
PLATELET # BLD AUTO: 386 10E3/UL (ref 150–450)
POTASSIUM BLD-SCNC: 3.3 MMOL/L (ref 3.4–5.3)
PROT SERPL-MCNC: 8.1 G/DL (ref 6.8–8.8)
RBC # BLD AUTO: 4.29 10E6/UL (ref 3.8–5.2)
SARS-COV-2 RNA RESP QL NAA+PROBE: NEGATIVE
SODIUM SERPL-SCNC: 133 MMOL/L (ref 133–144)
WBC # BLD AUTO: 4.7 10E3/UL (ref 4–11)

## 2022-01-12 PROCEDURE — 81025 URINE PREGNANCY TEST: CPT | Performed by: EMERGENCY MEDICINE

## 2022-01-12 PROCEDURE — 84439 ASSAY OF FREE THYROXINE: CPT | Performed by: PSYCHIATRY & NEUROLOGY

## 2022-01-12 PROCEDURE — 82977 ASSAY OF GGT: CPT | Performed by: PSYCHIATRY & NEUROLOGY

## 2022-01-12 PROCEDURE — 250N000013 HC RX MED GY IP 250 OP 250 PS 637: Performed by: EMERGENCY MEDICINE

## 2022-01-12 PROCEDURE — 82075 ASSAY OF BREATH ETHANOL: CPT | Performed by: EMERGENCY MEDICINE

## 2022-01-12 PROCEDURE — 82040 ASSAY OF SERUM ALBUMIN: CPT | Performed by: EMERGENCY MEDICINE

## 2022-01-12 PROCEDURE — 84443 ASSAY THYROID STIM HORMONE: CPT | Performed by: PSYCHIATRY & NEUROLOGY

## 2022-01-12 PROCEDURE — 80053 COMPREHEN METABOLIC PANEL: CPT | Performed by: EMERGENCY MEDICINE

## 2022-01-12 PROCEDURE — 80061 LIPID PANEL: CPT | Performed by: PSYCHIATRY & NEUROLOGY

## 2022-01-12 PROCEDURE — 80307 DRUG TEST PRSMV CHEM ANLYZR: CPT | Performed by: EMERGENCY MEDICINE

## 2022-01-12 PROCEDURE — 36415 COLL VENOUS BLD VENIPUNCTURE: CPT | Performed by: EMERGENCY MEDICINE

## 2022-01-12 PROCEDURE — U0005 INFEC AGEN DETEC AMPLI PROBE: HCPCS | Performed by: EMERGENCY MEDICINE

## 2022-01-12 PROCEDURE — 99284 EMERGENCY DEPT VISIT MOD MDM: CPT | Performed by: EMERGENCY MEDICINE

## 2022-01-12 PROCEDURE — 85025 COMPLETE CBC W/AUTO DIFF WBC: CPT | Performed by: EMERGENCY MEDICINE

## 2022-01-12 PROCEDURE — 99285 EMERGENCY DEPT VISIT HI MDM: CPT | Performed by: EMERGENCY MEDICINE

## 2022-01-12 PROCEDURE — C9803 HOPD COVID-19 SPEC COLLECT: HCPCS | Performed by: EMERGENCY MEDICINE

## 2022-01-12 RX ORDER — DIAZEPAM 5 MG
5-20 TABLET ORAL EVERY 30 MIN PRN
Status: DISCONTINUED | OUTPATIENT
Start: 2022-01-12 | End: 2022-01-17 | Stop reason: HOSPADM

## 2022-01-12 RX ORDER — TRIAMCINOLONE ACETONIDE 5 MG/G
CREAM TOPICAL 2 TIMES DAILY PRN
COMMUNITY
Start: 2021-12-17

## 2022-01-12 RX ORDER — FOLIC ACID 1 MG/1
1 TABLET ORAL ONCE
Status: COMPLETED | OUTPATIENT
Start: 2022-01-12 | End: 2022-01-12

## 2022-01-12 RX ORDER — MULTIPLE VITAMINS W/ MINERALS TAB 9MG-400MCG
1 TAB ORAL ONCE
Status: COMPLETED | OUTPATIENT
Start: 2022-01-12 | End: 2022-01-12

## 2022-01-12 RX ORDER — NYSTATIN 100000 U/G
CREAM TOPICAL DAILY PRN
COMMUNITY
Start: 2021-12-17

## 2022-01-12 RX ORDER — POTASSIUM CHLORIDE 750 MG/1
40 TABLET, EXTENDED RELEASE ORAL ONCE
Status: COMPLETED | OUTPATIENT
Start: 2022-01-12 | End: 2022-01-12

## 2022-01-12 RX ORDER — AMOXICILLIN 500 MG/1
500 CAPSULE ORAL 2 TIMES DAILY
COMMUNITY

## 2022-01-12 RX ADMIN — POTASSIUM CHLORIDE 40 MEQ: 750 TABLET, EXTENDED RELEASE ORAL at 23:05

## 2022-01-12 RX ADMIN — DIAZEPAM 10 MG: 5 TABLET ORAL at 20:21

## 2022-01-12 RX ADMIN — FOLIC ACID 1 MG: 1 TABLET ORAL at 20:22

## 2022-01-12 RX ADMIN — THIAMINE HCL TAB 100 MG 100 MG: 100 TAB at 20:21

## 2022-01-12 RX ADMIN — MULTIPLE VITAMINS W/ MINERALS TAB 1 TABLET: TAB at 20:22

## 2022-01-12 RX ADMIN — NICOTINE POLACRILEX 4 MG: 2 GUM, CHEWING BUCCAL at 20:32

## 2022-01-12 ASSESSMENT — ENCOUNTER SYMPTOMS: NERVOUS/ANXIOUS: 1

## 2022-01-12 ASSESSMENT — MIFFLIN-ST. JEOR: SCORE: 1223.7

## 2022-01-13 PROBLEM — F10.930 ALCOHOL WITHDRAWAL SYNDROME WITHOUT COMPLICATION (H): Status: ACTIVE | Noted: 2022-01-13

## 2022-01-13 LAB
CHOLEST SERPL-MCNC: 250 MG/DL
GGT SERPL-CCNC: 154 U/L (ref 0–40)
HDLC SERPL-MCNC: 118 MG/DL
LDLC SERPL CALC-MCNC: 104 MG/DL
MAGNESIUM SERPL-MCNC: 1.9 MG/DL (ref 1.6–2.3)
NONHDLC SERPL-MCNC: 132 MG/DL
POTASSIUM BLD-SCNC: 4.3 MMOL/L (ref 3.4–5.3)
T4 FREE SERPL-MCNC: 0.83 NG/DL (ref 0.76–1.46)
TRIGL SERPL-MCNC: 139 MG/DL
TSH SERPL DL<=0.005 MIU/L-ACNC: 0.26 MU/L (ref 0.4–4)

## 2022-01-13 PROCEDURE — 99232 SBSQ HOSP IP/OBS MODERATE 35: CPT | Performed by: PHYSICIAN ASSISTANT

## 2022-01-13 PROCEDURE — 250N000013 HC RX MED GY IP 250 OP 250 PS 637: Performed by: PSYCHIATRY & NEUROLOGY

## 2022-01-13 PROCEDURE — 250N000013 HC RX MED GY IP 250 OP 250 PS 637: Performed by: EMERGENCY MEDICINE

## 2022-01-13 PROCEDURE — 84132 ASSAY OF SERUM POTASSIUM: CPT | Performed by: PHYSICIAN ASSISTANT

## 2022-01-13 PROCEDURE — 36415 COLL VENOUS BLD VENIPUNCTURE: CPT | Performed by: PHYSICIAN ASSISTANT

## 2022-01-13 PROCEDURE — 128N000004 HC R&B CD ADULT

## 2022-01-13 PROCEDURE — HZ2ZZZZ DETOXIFICATION SERVICES FOR SUBSTANCE ABUSE TREATMENT: ICD-10-PCS | Performed by: PSYCHIATRY & NEUROLOGY

## 2022-01-13 PROCEDURE — H2032 ACTIVITY THERAPY, PER 15 MIN: HCPCS

## 2022-01-13 PROCEDURE — 83735 ASSAY OF MAGNESIUM: CPT | Performed by: PHYSICIAN ASSISTANT

## 2022-01-13 PROCEDURE — 250N000011 HC RX IP 250 OP 636: Performed by: PSYCHIATRY & NEUROLOGY

## 2022-01-13 PROCEDURE — 99207 PR CONSULT E&M CHANGED TO SUBSEQUENT LEVEL: CPT | Performed by: PHYSICIAN ASSISTANT

## 2022-01-13 PROCEDURE — 99223 1ST HOSP IP/OBS HIGH 75: CPT | Mod: AI | Performed by: PSYCHIATRY & NEUROLOGY

## 2022-01-13 RX ORDER — LEVETIRACETAM 500 MG/1
500 TABLET ORAL 2 TIMES DAILY
Status: DISCONTINUED | OUTPATIENT
Start: 2022-01-13 | End: 2022-01-17 | Stop reason: HOSPADM

## 2022-01-13 RX ORDER — HYDROXYZINE HYDROCHLORIDE 25 MG/1
25 TABLET, FILM COATED ORAL 3 TIMES DAILY PRN
Status: DISCONTINUED | OUTPATIENT
Start: 2022-01-13 | End: 2022-01-13

## 2022-01-13 RX ORDER — ESCITALOPRAM OXALATE 10 MG/1
20 TABLET ORAL DAILY
Status: DISCONTINUED | OUTPATIENT
Start: 2022-01-13 | End: 2022-01-17 | Stop reason: HOSPADM

## 2022-01-13 RX ORDER — TRAZODONE HYDROCHLORIDE 50 MG/1
50 TABLET, FILM COATED ORAL
Status: DISCONTINUED | OUTPATIENT
Start: 2022-01-13 | End: 2022-01-17 | Stop reason: HOSPADM

## 2022-01-13 RX ORDER — LORATADINE 10 MG/1
10 TABLET ORAL DAILY PRN
Status: DISCONTINUED | OUTPATIENT
Start: 2022-01-13 | End: 2022-01-17 | Stop reason: HOSPADM

## 2022-01-13 RX ORDER — ATENOLOL 50 MG/1
50 TABLET ORAL DAILY PRN
Status: DISCONTINUED | OUTPATIENT
Start: 2022-01-13 | End: 2022-01-17 | Stop reason: HOSPADM

## 2022-01-13 RX ORDER — ONDANSETRON 4 MG/1
4 TABLET, ORALLY DISINTEGRATING ORAL EVERY 6 HOURS PRN
Status: DISCONTINUED | OUTPATIENT
Start: 2022-01-13 | End: 2022-01-17 | Stop reason: HOSPADM

## 2022-01-13 RX ORDER — ACETAMINOPHEN 325 MG/1
650 TABLET ORAL EVERY 4 HOURS PRN
Status: DISCONTINUED | OUTPATIENT
Start: 2022-01-13 | End: 2022-01-17 | Stop reason: HOSPADM

## 2022-01-13 RX ORDER — LOPERAMIDE HCL 2 MG
2 CAPSULE ORAL 4 TIMES DAILY PRN
Status: DISCONTINUED | OUTPATIENT
Start: 2022-01-13 | End: 2022-01-17 | Stop reason: HOSPADM

## 2022-01-13 RX ORDER — NICOTINE 21 MG/24HR
1 PATCH, TRANSDERMAL 24 HOURS TRANSDERMAL DAILY
Status: DISCONTINUED | OUTPATIENT
Start: 2022-01-13 | End: 2022-01-17 | Stop reason: HOSPADM

## 2022-01-13 RX ORDER — TRAZODONE HYDROCHLORIDE 50 MG/1
50-100 TABLET, FILM COATED ORAL AT BEDTIME
Status: DISCONTINUED | OUTPATIENT
Start: 2022-01-13 | End: 2022-01-17 | Stop reason: HOSPADM

## 2022-01-13 RX ORDER — FOLIC ACID 1 MG/1
1 TABLET ORAL DAILY
Status: DISCONTINUED | OUTPATIENT
Start: 2022-01-13 | End: 2022-01-17 | Stop reason: HOSPADM

## 2022-01-13 RX ORDER — CYCLOBENZAPRINE HCL 10 MG
10 TABLET ORAL 3 TIMES DAILY PRN
Status: DISCONTINUED | OUTPATIENT
Start: 2022-01-13 | End: 2022-01-17 | Stop reason: HOSPADM

## 2022-01-13 RX ORDER — DIAZEPAM 5 MG
5-20 TABLET ORAL EVERY 30 MIN PRN
Status: DISCONTINUED | OUTPATIENT
Start: 2022-01-13 | End: 2022-01-13

## 2022-01-13 RX ORDER — MULTIPLE VITAMINS W/ MINERALS TAB 9MG-400MCG
1 TAB ORAL DAILY
Status: DISCONTINUED | OUTPATIENT
Start: 2022-01-13 | End: 2022-01-17 | Stop reason: HOSPADM

## 2022-01-13 RX ORDER — MAGNESIUM HYDROXIDE/ALUMINUM HYDROXICE/SIMETHICONE 120; 1200; 1200 MG/30ML; MG/30ML; MG/30ML
30 SUSPENSION ORAL EVERY 4 HOURS PRN
Status: DISCONTINUED | OUTPATIENT
Start: 2022-01-13 | End: 2022-01-17 | Stop reason: HOSPADM

## 2022-01-13 RX ORDER — AMOXICILLIN 250 MG
1 CAPSULE ORAL 2 TIMES DAILY PRN
Status: DISCONTINUED | OUTPATIENT
Start: 2022-01-13 | End: 2022-01-17 | Stop reason: HOSPADM

## 2022-01-13 RX ORDER — HYDROXYZINE HYDROCHLORIDE 25 MG/1
25 TABLET, FILM COATED ORAL EVERY 4 HOURS PRN
Status: DISCONTINUED | OUTPATIENT
Start: 2022-01-13 | End: 2022-01-17 | Stop reason: HOSPADM

## 2022-01-13 RX ADMIN — THIAMINE HCL TAB 100 MG 100 MG: 100 TAB at 08:45

## 2022-01-13 RX ADMIN — LEVETIRACETAM 500 MG: 500 TABLET, FILM COATED ORAL at 08:45

## 2022-01-13 RX ADMIN — ESCITALOPRAM OXALATE 20 MG: 10 TABLET ORAL at 08:45

## 2022-01-13 RX ADMIN — NICOTINE POLACRILEX 2 MG: 2 GUM, CHEWING ORAL at 16:17

## 2022-01-13 RX ADMIN — FOLIC ACID 1 MG: 1 TABLET ORAL at 08:45

## 2022-01-13 RX ADMIN — DIAZEPAM 10 MG: 5 TABLET ORAL at 01:07

## 2022-01-13 RX ADMIN — LOPERAMIDE HYDROCHLORIDE 2 MG: 2 CAPSULE ORAL at 18:44

## 2022-01-13 RX ADMIN — NICOTINE POLACRILEX 2 MG: 2 GUM, CHEWING ORAL at 22:01

## 2022-01-13 RX ADMIN — NICOTINE POLACRILEX 2 MG: 2 GUM, CHEWING ORAL at 14:07

## 2022-01-13 RX ADMIN — NICOTINE POLACRILEX 2 MG: 2 GUM, CHEWING ORAL at 20:07

## 2022-01-13 RX ADMIN — TRAZODONE HYDROCHLORIDE 100 MG: 50 TABLET ORAL at 21:58

## 2022-01-13 RX ADMIN — HYDROXYZINE HYDROCHLORIDE 25 MG: 25 TABLET, FILM COATED ORAL at 17:50

## 2022-01-13 RX ADMIN — LOPERAMIDE HYDROCHLORIDE 2 MG: 2 CAPSULE ORAL at 16:17

## 2022-01-13 RX ADMIN — NICOTINE 1 PATCH: 21 PATCH, EXTENDED RELEASE TRANSDERMAL at 08:45

## 2022-01-13 RX ADMIN — ATENOLOL 50 MG: 50 TABLET ORAL at 12:19

## 2022-01-13 RX ADMIN — DIAZEPAM 10 MG: 5 TABLET ORAL at 16:17

## 2022-01-13 RX ADMIN — DIAZEPAM 10 MG: 5 TABLET ORAL at 08:45

## 2022-01-13 RX ADMIN — LEVETIRACETAM 500 MG: 500 TABLET, FILM COATED ORAL at 20:07

## 2022-01-13 RX ADMIN — ONDANSETRON 4 MG: 4 TABLET, ORALLY DISINTEGRATING ORAL at 12:18

## 2022-01-13 RX ADMIN — HYDROXYZINE HYDROCHLORIDE 25 MG: 25 TABLET, FILM COATED ORAL at 21:59

## 2022-01-13 RX ADMIN — DIAZEPAM 10 MG: 5 TABLET ORAL at 03:59

## 2022-01-13 RX ADMIN — DIAZEPAM 10 MG: 5 TABLET ORAL at 20:07

## 2022-01-13 RX ADMIN — TRAZODONE HYDROCHLORIDE 50 MG: 50 TABLET ORAL at 01:07

## 2022-01-13 RX ADMIN — HYDROXYZINE HYDROCHLORIDE 25 MG: 25 TABLET, FILM COATED ORAL at 12:18

## 2022-01-13 RX ADMIN — MULTIPLE VITAMINS W/ MINERALS TAB 1 TABLET: TAB at 08:45

## 2022-01-13 RX ADMIN — DIAZEPAM 10 MG: 5 TABLET ORAL at 12:19

## 2022-01-13 ASSESSMENT — ACTIVITIES OF DAILY LIVING (ADL)
NUMBER_OF_TIMES_PATIENT_HAS_FALLEN_WITHIN_LAST_SIX_MONTHS: 1
DRESSING/BATHING_DIFFICULTY: NO
WEAR_GLASSES_OR_BLIND: YES
CONCENTRATING,_REMEMBERING_OR_MAKING_DECISIONS_DIFFICULTY: NO
LAUNDRY: WITH SUPERVISION
DRESS: INDEPENDENT
WALKING_OR_CLIMBING_STAIRS_DIFFICULTY: NO
TOILETING_ISSUES: NO
DIFFICULTY_EATING/SWALLOWING: NO
DRESS: STREET CLOTHES;INDEPENDENT
HYGIENE/GROOMING: INDEPENDENT
HYGIENE/GROOMING: HANDWASHING;INDEPENDENT
HEARING_DIFFICULTY_OR_DEAF: NO
VISION_MANAGEMENT: GLASSES AND CONTACTS
ORAL_HYGIENE: INDEPENDENT
FALL_HISTORY_WITHIN_LAST_SIX_MONTHS: YES
DOING_ERRANDS_INDEPENDENTLY_DIFFICULTY: NO
ORAL_HYGIENE: INDEPENDENT
DIFFICULTY_COMMUNICATING: NO

## 2022-01-13 NOTE — ED PROVIDER NOTES
[facility] [place] EMERGENCY DEPARTMENT ([facility])       1/12/22  History     Chief Complaint   Patient presents with     Alcohol Intoxication     Patient is here for detox for alcohol, history of seizures     Suicidal     Thoughts, no plan      The history is provided by the patient and medical records.     Evelyn Hudson is a 51 year old female with a past medical history significant for alcohol abuse and alcoholic withdrawal seizures. who presents to the Emergency Department requesting alcohol detox. Patient reports buying a 12 pack of white claw and drinking 6 of them today right before ED arrival, and also reports withdrawal. Patient is requesting detox and has made arrangements for treatment afterwards. She has been drinking her whole life but reports relapsing 1 month ago and now drinks 18 white claws daily. She states she wakes up, drinks, and goes back to sleep. Patient has a history of withdrawal seizures, with last seizure occurring at the end of August 2021. Patient has been in 4 outpatient treatment programs total. She denies suicidal or homicidal ideation.       Past Medical History  Past Medical History:   Diagnosis Date     Acne      Alcohol withdrawal seizure (H)      Alcoholism /alcohol abuse      Allergic rhinitis      Anxiety      Hypokalemia      Hypomagnesemia      Past Surgical History:   Procedure Laterality Date     COLON SURGERY       INSERT INTRACORONARY STENT N/A 5/8/2017    Procedure: EXCISION BACK AND RIGHT ARM LIPOMA;  Surgeon: Rickey Anne MD;  Location: [facility] OR;  Service:      IR INTERCOSTAL STEROID INJECTION SINGLE LEVEL  4/30/2020     IR INTERCOSTAL STEROID INJECTION SINGLE LEVEL  4/30/2020     RECTAL POLYPECTOMY       TUBAL LIGATION Bilateral 6/3/2014    Procedure: BILATERAL LAPAROSCOPIC TUBAL LIGATION ;  Surgeon: Lorena Jiménez MD;  Location: [facility] OR;  Service:      carboxymethylcellulose (REFRESH LIQUIGEL) 1 % ophthalmic solution  cyclobenzaprine  "(FLEXERIL) 10 MG tablet  diazepam (VALIUM) 5 MG tablet  escitalopram oxalate (LEXAPRO) 20 MG tablet  folic acid (FOLVITE) 1 MG tablet  glucosamine-chondroitin 500-400 mg cap  hydrOXYzine (ATARAX) 25 MG tablet  Lactobacillus rhamnosus GG (CULTURELLE) 10-15 Billion cell capsule  levETIRAcetam (KEPPRA) 500 MG tablet  loratadine (CLARITIN) 10 MG tablet  multivitamin capsule  naltrexone (DEPADE/REVIA) 50 MG tablet  nicotine (NICODERM CQ) 21 MG/24HR 24 hr patch  nicotine polacrilex (NICORETTE) 4 MG gum  omeprazole (PRILOSEC) 20 MG capsule  polyethylene glycol (MIRALAX) 17 gram packet  traZODone (DESYREL) 50 MG tablet      Allergies   Allergen Reactions     Ciprofloxacin Anaphylaxis     Throat swelling     Family History  No family history on file.  Social History   Social History     Tobacco Use     Smoking status: Current Every Day Smoker     Packs/day: 1.00     Smokeless tobacco: Never Used   Substance Use Topics     Alcohol use: No     Drug use: No      Past medical history, past surgical history, medications, allergies, family history, and social history were reviewed with the patient. No additional pertinent items.     I have reviewed the Medications, Allergies, Past Medical and Surgical History, and Social History in the Epic system.    Review of Systems   Psychiatric/Behavioral: Negative for suicidal ideas. The patient is nervous/anxious.    All other systems reviewed and are negative.    A complete review of systems was performed with pertinent positives and negatives noted in the HPI, and all other systems negative.    Physical Exam   BP: 121/84  Pulse: 82  Temp: 97.5  F (36.4  C)  Resp: 18  Height: 157.5 cm (5' 2\")  Weight: 65.5 kg (144 lb 8 oz)  SpO2: 100 %      Physical Exam  Vitals and nursing note reviewed.   Constitutional:       General: She is not in acute distress.     Appearance: Normal appearance. She is well-developed. She is not ill-appearing or diaphoretic.   HENT:      Head: Normocephalic and " atraumatic.      Nose: Nose normal.   Eyes:      General: No scleral icterus.     Conjunctiva/sclera: Conjunctivae normal.   Cardiovascular:      Rate and Rhythm: Normal rate.   Pulmonary:      Effort: Pulmonary effort is normal. No respiratory distress.      Breath sounds: No stridor.   Abdominal:      General: There is no distension.   Musculoskeletal:         General: No deformity or signs of injury. Normal range of motion.      Cervical back: Normal range of motion and neck supple. No rigidity.   Skin:     General: Skin is warm and dry.      Coloration: Skin is not jaundiced or pale.      Findings: No rash.   Neurological:      General: No focal deficit present.      Mental Status: She is alert and oriented to person, place, and time.   Psychiatric:         Attention and Perception: Attention normal.         Mood and Affect: Mood is anxious. Affect is labile and tearful.         Behavior: Behavior normal. Behavior is cooperative.         Thought Content: Thought content does not include homicidal or suicidal ideation.         ED Course   At 6:42 PM the patient was seen and examined by Caryn Rosado MD in [facility].        Procedures                     Results for orders placed or performed during the hospital encounter of 01/12/22 (from the past 24 hour(s))   Alcohol breath test POCT   Result Value Ref Range    Alcohol Breath Test 0.276 (A) 0.00 - 0.01   HCG qualitative urine (UPT)   Result Value Ref Range    hCG Urine Qualitative Negative Negative   Urine Drugs of Abuse Screen    Narrative    The following orders were created for panel order Urine Drugs of Abuse Screen.  Procedure                               Abnormality         Status                     ---------                               -----------         ------                     Drug abuse screen 1 urin...[[id number]]  Abnormal            Final result                 Please view results for these tests on the individual orders.   Drug abuse screen 1  urine (ED)   Result Value Ref Range    Amphetamines Urine Screen Negative Screen Negative    Barbiturates Urine Screen Negative Screen Negative    Benzodiazepines Urine Screen Positive (A) Screen Negative    Cannabinoids Urine Screen Negative Screen Negative    Cocaine Urine Screen Negative Screen Negative    Opiates Urine Screen Negative Screen Negative   CBC with platelets differential    Narrative    The following orders were created for panel order CBC with platelets differential.  Procedure                               Abnormality         Status                     ---------                               -----------         ------                     CBC with platelets and d...[[id number]]  Abnormal            Final result                 Please view results for these tests on the individual orders.   Comprehensive metabolic panel   Result Value Ref Range    Sodium 133 133 - 144 mmol/L    Potassium 3.3 (L) 3.4 - 5.3 mmol/L    Chloride 97 94 - 109 mmol/L    Carbon Dioxide (CO2) 29 20 - 32 mmol/L    Anion Gap 7 3 - 14 mmol/L    Urea Nitrogen 6 (L) 7 - 30 mg/dL    Creatinine 0.57 0.52 - 1.04 mg/dL    Calcium 9.1 8.5 - 10.1 mg/dL    Glucose 132 (H) 70 - 99 mg/dL    Alkaline Phosphatase 188 (H) 40 - 150 U/L    AST 53 (H) 0 - 45 U/L    ALT 42 0 - 50 U/L    Protein Total 8.1 6.8 - 8.8 g/dL    Albumin 3.8 3.4 - 5.0 g/dL    Bilirubin Total 0.2 0.2 - 1.3 mg/dL    GFR Estimate >90 >60 mL/min/1.73m2   CBC with platelets and differential   Result Value Ref Range    WBC Count 4.7 4.0 - 11.0 10e3/uL    RBC Count 4.29 3.80 - 5.20 10e6/uL    Hemoglobin 14.3 11.7 - 15.7 g/dL    Hematocrit 41.1 35.0 - 47.0 %    MCV 96 78 - 100 fL    MCH 33.3 (H) 26.5 - 33.0 pg    MCHC 34.8 31.5 - 36.5 g/dL    RDW 15.7 (H) 10.0 - 15.0 %    Platelet Count 386 150 - 450 10e3/uL    % Neutrophils 36 %    % Lymphocytes 52 %    % Monocytes 9 %    % Eosinophils 1 %    % Basophils 2 %    % Immature Granulocytes 0 %    NRBCs per 100 WBC 0 <1 /100     Absolute Neutrophils 1.7 1.6 - 8.3 10e3/uL    Absolute Lymphocytes 2.4 0.8 - 5.3 10e3/uL    Absolute Monocytes 0.4 0.0 - 1.3 10e3/uL    Absolute Eosinophils 0.1 0.0 - 0.7 10e3/uL    Absolute Basophils 0.1 0.0 - 0.2 10e3/uL    Absolute Immature Granulocytes 0.0 <=0.4 10e3/uL    Absolute NRBCs 0.0 10e3/uL     Medications   diazepam (VALIUM) tablet 5-20 mg (10 mg Oral Given 1/12/22 2021)   potassium chloride ER (KLOR-CON M) CR tablet 40 mEq (has no administration in time range)   thiamine (B-1) tablet 100 mg (100 mg Oral Given 1/12/22 2021)   folic acid (FOLVITE) tablet 1 mg (1 mg Oral Given 1/12/22 2022)   multivitamin w/minerals (THERA-VIT-M) tablet 1 tablet (1 tablet Oral Given 1/12/22 2022)   nicotine (NICORETTE) gum 4 mg (4 mg Buccal Given 1/12/22 2032)             Assessments & Plan (with Medical Decision Making)   Evelyn Hudson is a 51 year old female with a past medical history significant for alcohol abuse and alcoholic withdrawal seizures. who presents to the Emergency Department requesting alcohol detox.    Ddx: alcohol dependence/abuse, acute withdrawal, acute intoxication, polysubstance abuse, alcoholic hepatitis/gastritis      Patient's labs within normal limits, as above.  Breath alcohol 0.276 on arrival.  MSSA protocol ordered and patient got dosed with 10 mg of Valium.  Given vitamins and Nicorette.  Patient is voluntary for admission to detox.  Medically cleared.  Mental health intake given handoff report.        I have reviewed the nursing notes.    I have reviewed the findings, diagnosis, plan and need for follow up with the patient.    New Prescriptions    No medications on file       Final diagnoses:   Alcohol withdrawal syndrome without complication (H)       IMonica am serving as a trained medical scribe to document services personally performed by Caryn Rosado MD, based on the provider's statements to me.      I, Caryn Rosado MD, was physically present and have reviewed and  verified the accuracy of this note documented by Monica Maloney.     Caryn Rosado MD  1/12/2022   [facility] EMERGENCY DEPARTMENT     Caryn Rosado MD  01/12/22 [id number]

## 2022-01-13 NOTE — H&P
Evelyn Hudson is a 51 year old female       History was provided by BAILEE who was a fair historian.   CHIEF COMPLAINT: Alcohol    HISTORY OF PRESENT ILLNESS:    Patient is a 51-year-old  female who came to the emergency room wanting help for her alcoholism patient reports this is a 3-month relapse..  She is having numerous stressors she believes her boyfriend is cheating on her and she has money problems she has strained relationship with her neighbor.  She is drinking approximately 18 white class when she wakes up she drinks and goes back to sleep.  She is having withdrawals she does have a history of seizures and this is what prompted her to get help.  Alcohol is her drug of choice    Patient has been using the following substances: Alcohol  Started at age 13, became a problem at 20    Patient has tolerance, withdrawal, progressive use, loss of control, spending more time and more amount than intended. Patient has made attempts to quit, is experiencing cravings, and reports negative consequences.         Patient denies any history of DTs or seizures                     Denies thoughts of suicide or harming others.      Denies auditory or visual hallucinations.     Patient smokes 2 packs/day    Patient denied any gambling    Substance Age first use First became regular or problematic Most recent use   Alcohol         Cannabis      Cocaine NONE       Stimulants NONE       Opioids NONE       Sedatives NONE       Hallucinogens NONE       Inhalants NONE       Other         OTC drugs NONE       Nicotine  2 packs/day        Patient does not have a history of overdose.  Patient does not have a history of IV use.  Patient does not have a history of hepatitis, HIV,  PSYCHIATRIC REVIEW OF SYSTEMS:         Psychiatric Review of Systems:   Depression:    Patient has depression she feels like she cannot function sleeping a lot lack of energy lack of motivation or interest poor appetite.  Patient does not have any  active suicidal plan or intent  Ying:       Denies: sleeplessness, increased goal-directed activities, abrupt increase in energy, pressured speech   impulsiveness, racing thoughts, increased goal-directed activities, pressured speech, increase in energy  Ying Feeling euphoric,Distractible,Impulsive,Grandiose,Talking excessively,Have energy without sleeping,Mood swings,Irritability  Psychosis:     Denies: visual hallucinations, auditory hallucinations, paranoia  Anxiety:    Denied: excessive worries that are difficult to control for the past 6 months,   Chronic anxiety , not able to stop worrying impacting sleep, poor conc, irritable , muscle tension fatigue    Denied panic attacks      PTSD: Patient was exposed to a traumatic event  Reports: re-experiencing past trauma, nightmares, trust issues, flashbacks,increased arousal, avoidance of traumatic stimuli, impaired function.  Negative cognition  hypervigilance    OCD:     Denies: obsessions, checking, symmetry, cleaning, skin picking.  ED:     Denies: restriction, binging, purging.         patient denies :symptoms of attention deficit disorder include a failure to pay attention to detail, a pattern of careless mistakes, a pattern of inattentive listening, a failure to follow through with projects, poor personal organization, losing necessary objects, distractibility, forgetfulness.                PSYCHIATRIC HISTORY     Previous diagnoses:     Patient was never psychiatrically hospitalized  Patient's was in chemical dependency treatment at [facility] in 2013 2014 she was sober for some years after that.    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              Family History:   FAMILY HISTORY:   Family History   Problem Relation Age of Onset     Chronic Obstructive Pulmonary Disease Father      Family Mental Health History-   mother having depression    Substance Use Problems - present for alcoholism in father             PTA Medications:     Medications Prior to Admission   Medication Sig Dispense Refill Last Dose     amoxicillin (AMOXIL) 500 MG capsule TAKE 1 CAPSULE BY MOUTH TWICE DAILY FOR ACNE   1/12/2022 at Unknown time     carboxymethylcellulose (REFRESH LIQUIGEL) 1 % ophthalmic solution [CARBOXYMETHYLCELLULOSE (REFRESH LIQUIGEL) 1 % OPHTHALMIC SOLUTION] Apply 1 drop to eye 2 (two) times a day as needed.   1/12/2022 at Unknown time     cyclobenzaprine (FLEXERIL) 10 MG tablet Take 1 tablet (10 mg) by mouth 3 times daily as needed for muscle spasms 20 tablet 0 Unknown at Unknown time     diazepam (VALIUM) 5 MG tablet Take 1 tablet (5 mg) by mouth every 6 hours as needed for withdrawal 10 tablet 0 1/11/2022 at Unknown time     escitalopram oxalate (LEXAPRO) 20 MG tablet [ESCITALOPRAM OXALATE (LEXAPRO) 20 MG TABLET] Take 20 mg by mouth daily.   1/11/2022 at Unknown time     hydrOXYzine (ATARAX) 25 MG tablet Take 1 tablet (25 mg) by mouth 3 times daily as needed for anxiety 60 tablet 0 1/11/2022 at Unknown time     levETIRAcetam (KEPPRA) 500 MG tablet Take 1 tablet (500 mg) by mouth 2 times daily 60 tablet 0 1/12/2022 at Unknown time     loratadine (CLARITIN) 10 MG tablet Take 10 mg by mouth daily as needed for allergies   1/12/2022 at Unknown time     nicotine polacrilex (NICORETTE) 4 MG gum Place 4 mg inside cheek as needed    1/12/2022 at Unknown time     nystatin (MYCOSTATIN) 414904 UNIT/GM external cream APPLY TOPICALLY TWICE DAILY   1/12/2022 at Unknown time     traZODone (DESYREL) 50 MG tablet Take  mg by mouth At Bedtime    1/11/2022 at Unknown time     triamcinolone (ARISTOCORT HP) 0.5 % external cream APPLY TOPICALLY TWICE DAILY    1/12/2022 at Unknown time     folic acid (FOLVITE) 1 MG tablet Take 1 tablet (1 mg) by mouth daily 30 tablet 0      glucosamine-chondroitin 500-400 mg cap Take 1 capsule by mouth daily as needed         naltrexone (DEPADE/REVIA) 50 MG tablet Take 1 tablet (50 mg) by mouth At Bedtime Take 1/2 tab WITH FOOD EACH NIGHT FOR 3 NIGHTS, THE TAKE 1 TAB EACH NIGHT. 30 tablet 0      nicotine (NICODERM CQ) 21 MG/24HR 24 hr patch Place 1 patch onto the skin daily (Patient not taking: Reported on 12/14/2021) 28 patch 0           Allergies:     Allergies   Allergen Reactions     Ciprofloxacin Anaphylaxis     Throat swelling          Labs:     Recent Results (from the past 48 hour(s))   Alcohol breath test POCT    Collection Time: 01/12/22  6:19 PM   Result Value Ref Range    Alcohol Breath Test 0.276 (A) 0.00 - 0.01   HCG qualitative urine (UPT)    Collection Time: 01/12/22  8:02 PM   Result Value Ref Range    hCG Urine Qualitative Negative Negative   Drug abuse screen 1 urine (ED)    Collection Time: 01/12/22  8:02 PM   Result Value Ref Range    Amphetamines Urine Screen Negative Screen Negative    Barbiturates Urine Screen Negative Screen Negative    Benzodiazepines Urine Screen Positive (A) Screen Negative    Cannabinoids Urine Screen Negative Screen Negative    Cocaine Urine Screen Negative Screen Negative    Opiates Urine Screen Negative Screen Negative   Comprehensive metabolic panel    Collection Time: 01/12/22  8:08 PM   Result Value Ref Range    Sodium 133 133 - 144 mmol/L    Potassium 3.3 (L) 3.4 - 5.3 mmol/L    Chloride 97 94 - 109 mmol/L    Carbon Dioxide (CO2) 29 20 - 32 mmol/L    Anion Gap 7 3 - 14 mmol/L    Urea Nitrogen 6 (L) 7 - 30 mg/dL    Creatinine 0.57 0.52 - 1.04 mg/dL    Calcium 9.1 8.5 - 10.1 mg/dL    Glucose 132 (H) 70 - 99 mg/dL    Alkaline Phosphatase 188 (H) 40 - 150 U/L    AST 53 (H) 0 - 45 U/L    ALT 42 0 - 50 U/L    Protein Total 8.1 6.8 - 8.8 g/dL    Albumin 3.8 3.4 - 5.0 g/dL    Bilirubin Total  0.2 0.2 - 1.3 mg/dL    GFR Estimate >90 >60 mL/min/1.73m2   CBC with platelets and differential    Collection Time: 01/12/22  8:08 PM   Result Value Ref Range    WBC Count 4.7 4.0 - 11.0 10e3/uL    RBC Count 4.29 3.80 - 5.20 10e6/uL    Hemoglobin 14.3 11.7 - 15.7 g/dL    Hematocrit 41.1 35.0 - 47.0 %    MCV 96 78 - 100 fL    MCH 33.3 (H) 26.5 - 33.0 pg    MCHC 34.8 31.5 - 36.5 g/dL    RDW 15.7 (H) 10.0 - 15.0 %    Platelet Count 386 150 - 450 10e3/uL    % Neutrophils 36 %    % Lymphocytes 52 %    % Monocytes 9 %    % Eosinophils 1 %    % Basophils 2 %    % Immature Granulocytes 0 %    NRBCs per 100 WBC 0 <1 /100    Absolute Neutrophils 1.7 1.6 - 8.3 10e3/uL    Absolute Lymphocytes 2.4 0.8 - 5.3 10e3/uL    Absolute Monocytes 0.4 0.0 - 1.3 10e3/uL    Absolute Eosinophils 0.1 0.0 - 0.7 10e3/uL    Absolute Basophils 0.1 0.0 - 0.2 10e3/uL    Absolute Immature Granulocytes 0.0 <=0.4 10e3/uL    Absolute NRBCs 0.0 10e3/uL   GGT    Collection Time: 01/12/22  8:08 PM   Result Value Ref Range     (H) 0 - 40 U/L   TSH with free T4 reflex    Collection Time: 01/12/22  8:08 PM   Result Value Ref Range    TSH 0.26 (L) 0.40 - 4.00 mU/L   Lipid Profile    Collection Time: 01/12/22  8:08 PM   Result Value Ref Range    Cholesterol 250 (H) <200 mg/dL    Triglycerides 139 <150 mg/dL    Direct Measure  >=50 mg/dL    LDL Cholesterol Calculated 104 (H) <=100 mg/dL    Non HDL Cholesterol 132 (H) <130 mg/dL   T4 free    Collection Time: 01/12/22  8:08 PM   Result Value Ref Range    Free T4 0.83 0.76 - 1.46 ng/dL   Asymptomatic COVID-19 Virus (Coronavirus) by PCR Nasopharyngeal    Collection Time: 01/12/22  8:09 PM    Specimen: Nasopharyngeal; Swab   Result Value Ref Range    SARS CoV2 PCR Negative Negative, Testing sent to reference lab. Results will be returned via unsolicited result   Potassium    Collection Time: 01/13/22  8:35 AM   Result Value Ref Range    Potassium 4.3 3.4 - 5.3 mmol/L   Magnesium    Collection Time:  "01/13/22  8:35 AM   Result Value Ref Range    Magnesium 1.9 1.6 - 2.3 mg/dL         BP (!) 135/96 (BP Location: Left arm)   Pulse (!) 123   Temp 97.6  F (36.4  C) (Temporal)   Resp 16   Ht 1.575 m (5' 2\")   Wt 65.5 kg (144 lb 8 oz)   SpO2 95%   BMI 26.43 kg/m    Weight is 144 lbs 8 oz  Body mass index is 26.43 kg/m .    Physical Exam:     ROS: 10 point ROS neg other than the symptoms noted above in the HPI.            Past Medical History:   PAST MEDICAL HISTORY:   Past Medical History:   Diagnosis Date     Acne      Alcohol withdrawal seizure (H)      Alcoholism /alcohol abuse      Allergic rhinitis      Anxiety      Hypokalemia      Hypomagnesemia        PAST SURGICAL HISTORY:   Past Surgical History:   Procedure Laterality Date     COLON SURGERY       INSERT INTRACORONARY STENT N/A 5/8/2017    Procedure: EXCISION BACK AND RIGHT ARM LIPOMA;  Surgeon: Rickey Anne MD;  Location: [facility];  Service:      IR INTERCOSTAL STEROID INJECTION SINGLE LEVEL  4/30/2020     IR INTERCOSTAL STEROID INJECTION SINGLE LEVEL  4/30/2020     RECTAL POLYPECTOMY       TUBAL LIGATION Bilateral 6/3/2014    Procedure: BILATERAL LAPAROSCOPIC TUBAL LIGATION ;  Surgeon: Lorena Jiménez MD;  Location: [facility] OR;  Service:        -    -           MENTAL STATUS EXAM:      Constitutional: General appearance of patient:  Appearance:  awake, alert, appeared as age stated, adequate groomed and slightly unkempt  Attitude:  cooperative  Eye Contact:  good  Mood:   Anxious  Affect:  congruent   Speech:  clear, coherent normal rate   Psychomotor Behavior:  no evidence of tardive dyskinesia, dystonia, or tics  Thought Process:  logical, linear and goal oriented  Associations:  no loose associations  Thought Content:  no evidence of psychotic thought and active suicidal ideation present  Denied any active suicidal /homicidation ideation plan intent   Insight:  fair  Judgment:  fair  Oriented to:  time, person, and " place  Attention Span and Concentration:  intact  Recent and Remote Memory:  intact  Language:  english with appropriate syntax and vocabulary  Fund of Knowledge: appropriate  Muscle Strength and Tone: normal  Gait and Station: Normal     There are no abnormal or psychotic thoughts, no preoccupations, no overvalued ideas, no rumination, no obsessions, no compulsions, no somatic concerns, no hypochrondriasis, no ideas of reference, and no delusions.  Patient denies homicidal thoughts.   Patient denies suicidal thoughts.  Patient appears to have good judgment and good insight.     Musculoskeletal: Patient shows no abnormalities of motor activity: there is no tremor, no tic, and no dystonia.  There is no apparent muscle atrophy, strength and tone appear normal, and there are no abnormal movements.  Patient has normal gait and stance.    DISCUSSION:         Assessment:         Biological disposition alcoholism and depression  Patient has these particular stressors Money relationship   psychologically abusing alcohol smoking and having neurovegetative symptoms of depression as patient has symptoms of PTSD  Patient has chronic illness exacerbation leading to hospitalization progression as described.     Patient has been unable to stop using drugs in the community due to both physical and psychological symptoms.  Continued use will put the patient at risk for medical and/or psychiatric complications.      Inpatient psychiatric hospitalization is warranted at this time for safety, stabilization, and possible adjustment in medications.       Diagnoses:    Alcohol use disorder severe  Alcohol withdrawal severe with history of seizures   Nicotine use disorder severe   major depressive disorder moderate recurrent without psychosis  PTSD chronic         Plan:   Problem list  1#alcohol use disorder severe alcohol withdrawal severe     - [facility] protocol using Valium for management of alcohol withdrawal  Patient is a pulse of 123  elevated blood pressure 135/96  Patient has eating disturbance tremor sweats  Patient received 20 mg of diazepam patient scored 15  - Continue thiamine, folate, and multivitamin daily    2#for depression and anxiety and PTSD patient will continue Lexapro 20 mg    3#patient has hypokalemia potassium is 3.3 report internal medicine consult  4#patient has elevated liver enzymes AST 53  most likely from alcoholism.  Put internal medicine consult  5#patient has TSH low 0.26 T4 is normal  Patient has elevated alkaline phosphatase of 188 and potential medicine consult    - Consider anti-craving medications prior to discharge. Pt willing to review additional information about both naltrexone and Antabuse.    Alcohol withdrawal nausea prn Zofran as needed for nausea     hydroxyzine 25 mg q4h prn for acute anxiety  Trazodone 50 mg at bedtime prn for sleep disturbances       Patient has been unable to stop using drugs in the community due to both physical and psychological symptoms.  Continued use will put the patient at risk for medical and/or psychiatric complications.    I HAVE REVIEWED LABS WITH PT AND TALKED ABOUT RESULTS WITH PT  I HAVE REVIEWED AND SUMMARIZED OLD RECORDS including his medication reconcilation of his home medications  and PDMP   I HAVE SPOKEN WITH RN ABOUT MEDICATIONS AND DETOX SCORES  I HAVE SPOKEN WITH CM ABOUT PTS TREATMENT OPTIONS     Discussed in detail about patient's smoking patient was advised to quit patient was told about the impact of smoking.  Patient's willingness to quit was assessed.  I provided methods and skills for cessation including medication management nicotine gum patch.  Patient did not set a quit date.  Patient is interested in quitting .we discussed pharmacotherapy options .patient agreed to take nicotine gum patch lozenge.  We discussed behavioral change techniques when craving nicotine including deep breathing drinking glass of water, taking a  walk.            Laboratory/Imaging:    Liver Function Studies -   Recent Labs   Lab Test 01/12/22 2008   PROTTOTAL 8.1   ALBUMIN 3.8   BILITOTAL 0.2   ALKPHOS 188*   AST 53*   ALT 42      Last Comprehensive Metabolic Panel:  Sodium   Date Value Ref Range Status   01/12/2022 133 133 - 144 mmol/L Final     Potassium   Date Value Ref Range Status   01/13/2022 4.3 3.4 - 5.3 mmol/L Final     Chloride   Date Value Ref Range Status   01/12/2022 97 94 - 109 mmol/L Final     Carbon Dioxide (CO2)   Date Value Ref Range Status   01/12/2022 29 20 - 32 mmol/L Final     Anion Gap   Date Value Ref Range Status   01/12/2022 7 3 - 14 mmol/L Final     Glucose   Date Value Ref Range Status   01/12/2022 132 (H) 70 - 99 mg/dL Final     Urea Nitrogen   Date Value Ref Range Status   01/12/2022 6 (L) 7 - 30 mg/dL Final     Creatinine   Date Value Ref Range Status   01/12/2022 0.57 0.52 - 1.04 mg/dL Final     GFR Estimate   Date Value Ref Range Status   01/12/2022 >90 >60 mL/min/1.73m2 Final     Comment:     Effective December 21, 2021 eGFRcr in adults is calculated using the 2021 CKD-EPI creatinine equation which includes age and gender (Annel et al., NEJ, DOI: 10.1056/UWAMgw8001326)   04/29/2020 >60 >60 mL/min/1.73m2 Final     Calcium   Date Value Ref Range Status   01/12/2022 9.1 8.5 - 10.1 mg/dL Final     Bilirubin Total   Date Value Ref Range Status   01/12/2022 0.2 0.2 - 1.3 mg/dL Final     Alkaline Phosphatase   Date Value Ref Range Status   01/12/2022 188 (H) 40 - 150 U/L Final     ALT   Date Value Ref Range Status   01/12/2022 42 0 - 50 U/L Final     AST   Date Value Ref Range Status   01/12/2022 53 (H) 0 - 45 U/L Final                   Medical treatment/interventions:  Medical concerns: As above    - Consults: IM consult placed. Appreciate assistance.     Legal Status: Voluntary     Safety Assessment:   Checks: Status 15  Pt has not required locked seclusion or restraints in the past 24 hours to maintain safety, please refer  "to RN documentation for further details.    The risks, benefits, alternatives and side effects have been discussed and are understood by the patient.       Patient will be treated in therapeutic milieu with appropriate individual and group therapies as described.  Disposition: Pending clinical stabilization. Pt does  appear interested in COMPLETE DETOX AND DO TRT  Length of stay 3-5 days        \"Much or all of the text in this note was generated through the use of Dragon Dictate voice to text software. Errors in spelling or words which appear to be out of contact are unintentional, may be present due having escaped editing\"     "

## 2022-01-13 NOTE — PROGRESS NOTES
"SPIRITUAL HEALTH SERVICES   Spiritual Assessment Progress Note (Behavioral Health/CD Focus)  [facility] ([facility] [place]) 3AW    REFERRAL SOURCE: Pt request for spiritual care on admission.    On this visit, I met with \"Estela\" in the lounge for 10 minutes. Shared introduction to spiritual care, assessment of spiritual needs/resources.    EXPERIENCE OF ILLNESS/HOSPITALIZATION:  Estela checked in as feeling \"shaky and tired;\" talked about plans for treatment and an upcoming surgery.    MEANING-MAKING:  Estela was tearful as she spoke of her children, 25, 22(?), and 16. Two of them live with their father and one on their own. \"I need to get past this. I don't like to drink. I want to get better.\"    SPIRITUALITY/VALUES/Adventist:  Islam, has attended [facility].    COPING/SPIRITUAL PRACTICES: Estela identified prayer and attending [employer] as helpful practices that she has \"gotten away from and need to get back to.\"    SUPPORT SYSTEMS: mom, mom's friends/Congregation group who pray for her    PLAN: I will follow up 1-2x weekly.                                                                                                                                             Deja Gutierrez MDiv  Associate   Pager [phone]  Office [phone]  [facility] remains available 24/7 for emergent requests/referrals, either by having the switchboard page the on-call  or by entering an ASAP/STAT consult in Epic (this will also page the on-call ). Routine Epic consults receive an initial response within 24 hours.    "

## 2022-01-13 NOTE — CONSULTS
Internal Medicine Initial Visit      Evelyn Hudson MRN# [id number]   YOB: 1970 Age: 51 year old   Date of Admission: 1/12/2022  PCP: Maribell Betancur    Referring Provider: Behavioral Health - Shreyas Tan MD  Reason for Visit: General Medical Evaluation     Assessment and Recommendations:   Evelyn Hudson is a 51 year old year old woman with a history of alcohol use disorder, withdrawal seizures, HTN, depression who was admitted to [facility] seeking detoxification from alcohol.    Alcohol withdrawal   Alcohol use disorder  Hx of withdrawal seizures  Last drink on 1/12. She does have a history of withdrawal seizures, most recent seizure occure din September 2020. Is maintained on Keppra prophylaxis. Follows with Neurology and was planning for Keppra taper[facility]. Mag level wnl.    - Continue on MSSA protocol with benzodiazepines as indicated   - Continue Keppra 500 mg BID. Instructed patient to continue this dose until she can follow-up with Neurology.    - Seizure precautions   - Management per Psychiatry   - Agree with MVI, folic acid, and thiamine supplements   - Notify medicine for SBP >180 or DBP >110    Hypertension: Patient reports being on metoprolol and is unsure of the dose. Reviewed with Pharmacy, there are no fills of metoprolol within the past 1 year. BP is only slightly elevated at this time (SBP in 130s).   - Will hold on ordering metoprolol as it is unclear if she is actually prescribed this   - Continue to monitor blood pressure   - She will need to follow-up with primary care provider within 1 week of discharge for BP recheck     Hypokalemia, resolved: K 3.3 in ED, normalized wiith replacement.     Transaminitis: ALT 53, AST wnl, Tbili wnl, alk phos 188. Prior labs with similar elevations. 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 Asymptomatic.    - Recommend repeat hepatic panel in 1-2 weeks with PCP    Hyperlipidemia: Total cholesterol 250, ,  . However, was not fasting for cholesterol panel.   - Recommend repeat fasting lipid panel with PCP    Low TSH: TSH 0.26, T4 wnl. No prior history of thyroid dysfunction.    - Recommend repeat TFTs in 4-6 weeks with PCP    Currently the patient is medically stable and Medicine will sign off. Please do not hesitate to contact if new questions or concerns arise.       Marnie Jaramillo PA-C  [facility] [place]  Hospitalist Service  Pager: [phone]       Chief Complaint:   Alcohol withdrawal     History of Present Illness:     History is obtained from the patient and medical record.     Evelyn Hudson is a 51 year old year old woman with a history of alcohol use disorder, withdrawal seizures, HTN, depression who was admitted to [facility] seeking detoxification from alcohol. Last drink on 1/12. She reports a history of withdrawal seizures, most recent seizure occurred in September 2020. She is maintained on Keppra prophylaxis and follows with Neurology. Per recent Neurology note in December they planned to start tapering her Keppra, however, she was not drinking heavily at that time.     Current withdrawal symptoms: tremulous, nauseated, tired. Tolerating oral fluid intake. No vomiting, abdominal pain, chest pain, shortness of breath.             Review of Systems:   The 10 point Review of Systems is negative other than noted in the HPI or here.           Past Medical History:   Reviewed and updated in Epic.  Past Medical History:   Diagnosis Date     Acne      Alcohol withdrawal seizure (H)      Alcoholism /alcohol abuse      Allergic rhinitis      Anxiety      Hypokalemia      Hypomagnesemia              Past Surgical History:   Reviewed and updated in Epic.  Past Surgical History:   Procedure Laterality Date     COLON SURGERY       INSERT INTRACORONARY STENT N/A 5/8/2017    Procedure: EXCISION BACK AND RIGHT ARM LIPOMA;  Surgeon: Rickey Anne MD;  Location: [facility];   Service:      IR INTERCOSTAL STEROID INJECTION SINGLE LEVEL  4/30/2020     IR INTERCOSTAL STEROID INJECTION SINGLE LEVEL  4/30/2020     RECTAL POLYPECTOMY       TUBAL LIGATION Bilateral 6/3/2014    Procedure: BILATERAL LAPAROSCOPIC TUBAL LIGATION ;  Surgeon: Lorena Jiménez MD;  Location: [facility] OR;  Service:              Social History:     Social History     Tobacco Use     Smoking status: Current Every Day Smoker     Packs/day: 1.00     Smokeless tobacco: Never Used   Substance Use Topics     Alcohol use: Yes     Alcohol/week: 18.0 standard drinks     Types: 18 Standard drinks or equivalent per week     Comment: 12-18 White Claws daily     Drug use: No             Family History:   Reviewed and updated in Epic.  Family History   Problem Relation Age of Onset     Chronic Obstructive Pulmonary Disease Father              Allergies:     Allergies   Allergen Reactions     Ciprofloxacin Anaphylaxis     Throat swelling             Medications:     Medications Prior to Admission   Medication Sig Dispense Refill Last Dose     amoxicillin (AMOXIL) 500 MG capsule TAKE 1 CAPSULE BY MOUTH TWICE DAILY FOR ACNE   1/12/2022 at Unknown time     carboxymethylcellulose (REFRESH LIQUIGEL) 1 % ophthalmic solution [CARBOXYMETHYLCELLULOSE (REFRESH LIQUIGEL) 1 % OPHTHALMIC SOLUTION] Apply 1 drop to eye 2 (two) times a day as needed.   1/12/2022 at Unknown time     cyclobenzaprine (FLEXERIL) 10 MG tablet Take 1 tablet (10 mg) by mouth 3 times daily as needed for muscle spasms 20 tablet 0 Unknown at Unknown time     diazepam (VALIUM) 5 MG tablet Take 1 tablet (5 mg) by mouth every 6 hours as needed for withdrawal 10 tablet 0 1/11/2022 at Unknown time     escitalopram oxalate (LEXAPRO) 20 MG tablet [ESCITALOPRAM OXALATE (LEXAPRO) 20 MG TABLET] Take 20 mg by mouth daily.   1/11/2022 at Unknown time     hydrOXYzine (ATARAX) 25 MG tablet Take 1 tablet (25 mg) by mouth 3 times daily as needed for anxiety 60 tablet 0 1/11/2022 at  Unknown time     levETIRAcetam (KEPPRA) 500 MG tablet Take 1 tablet (500 mg) by mouth 2 times daily 60 tablet 0 1/12/2022 at Unknown time     loratadine (CLARITIN) 10 MG tablet Take 10 mg by mouth daily as needed for allergies   1/12/2022 at Unknown time     nicotine polacrilex (NICORETTE) 4 MG gum Place 4 mg inside cheek as needed    1/12/2022 at Unknown time     nystatin (MYCOSTATIN) 015867 UNIT/GM external cream APPLY TOPICALLY TWICE DAILY   1/12/2022 at Unknown time     traZODone (DESYREL) 50 MG tablet Take  mg by mouth At Bedtime    1/11/2022 at Unknown time     triamcinolone (ARISTOCORT HP) 0.5 % external cream APPLY TOPICALLY TWICE DAILY   1/12/2022 at Unknown time     folic acid (FOLVITE) 1 MG tablet Take 1 tablet (1 mg) by mouth daily 30 tablet 0      glucosamine-chondroitin 500-400 mg cap Take 1 capsule by mouth daily as needed         naltrexone (DEPADE/REVIA) 50 MG tablet Take 1 tablet (50 mg) by mouth At Bedtime Take 1/2 tab WITH FOOD EACH NIGHT FOR 3 NIGHTS, THE TAKE 1 TAB EACH NIGHT. 30 tablet 0      nicotine (NICODERM CQ) 21 MG/24HR 24 hr patch Place 1 patch onto the skin daily (Patient not taking: Reported on 12/14/2021) 28 patch 0         Current Facility-Administered Medications   Medication     acetaminophen (TYLENOL) tablet 650 mg     alum & mag hydroxide-simethicone (MAALOX) suspension 30 mL     atenolol (TENORMIN) tablet 50 mg     cyclobenzaprine (FLEXERIL) tablet 10 mg     diazepam (VALIUM) tablet 5-20 mg     escitalopram (LEXAPRO) tablet 20 mg     folic acid (FOLVITE) tablet 1 mg     hydrOXYzine (ATARAX) tablet 25 mg     levETIRAcetam (KEPPRA) tablet 500 mg     loperamide (IMODIUM) capsule 2 mg     loratadine (CLARITIN) tablet 10 mg     multivitamin w/minerals (THERA-VIT-M) tablet 1 tablet     nicotine (NICODERM CQ) 21 MG/24HR 24 hr patch 1 patch     nicotine (NICORETTE) gum 2 mg     nicotine Patch in Place     ondansetron (ZOFRAN-ODT) ODT tab 4 mg     senna-docusate  "(SENOKOT-S/PERICOLACE) 8.6-50 MG per tablet 1 tablet     thiamine (B-1) tablet 100 mg     traZODone (DESYREL) tablet 50 mg     traZODone (DESYREL) tablet  mg            Physical Exam:   Blood pressure (!) 139/95, pulse (!) 126, temperature 97.4  F (36.3  C), temperature source Temporal, resp. rate 16, height 1.575 m (5' 2\"), weight 65.5 kg (144 lb 8 oz), SpO2 95 %.  Body mass index is 26.43 kg/m .  Constitutional: Awake and alert, in no apparent distress.   Eyes: Sclera clear, anicteric   Respiratory: Breathing comfortably on room air. Clear to auscultation bilaterally with no crackles, wheezing, or rhonchi. Good air entry throughout.   Cardiovascular: Tachycardic, regular rhythm. normal S1/S2. No rubs or murmurs.   GI: Soft, non-tender, non-distended. Normoactive bowel sounds.   Skin:  Good color. No jaundice. No visible rashes, lesions, or bruising of concern.   Neurologic: Alert and oriented. No focal deficits.           Data:   CBC:  Recent Labs   Lab Test 01/12/22 2008   WBC 4.7   RBC 4.29   HGB 14.3   HCT 41.1   MCV 96   MCH 33.3*   MCHC 34.8   RDW 15.7*          CMP:  Recent Labs   Lab Test 01/13/22  0835 01/12/22 2008   NA  --  133   POTASSIUM 4.3 3.3*   CHLORIDE  --  97   YASMIN  --  9.1   CO2  --  29   BUN  --  6*   CR  --  0.57   GLC  --  132*   AST  --  53*   ALT  --  42   BILITOTAL  --  0.2   ALBUMIN  --  3.8   PROTTOTAL  --  8.1   ALKPHOS  --  188*       TSH:  TSH   Date Value Ref Range Status   01/12/2022 0.26 (L) 0.40 - 4.00 mU/L Final       Unresulted Labs Ordered in the Past 30 Days of this Admission     No orders found for last 31 day(s).                     "

## 2022-01-13 NOTE — CONSULTS
TELEPSYCHIATRY TELEPHONE ADMISSION NOTE    Discussed with nurse Sharonda.  51-year-old female presented with alcohol detox.  No SI/HI. .  Patient has been medically cleared.  PMH significant for acne, CAD s/p stent, s/p colon surgery.  Allergy to cipro.  History of withdrawal seizures.    Vital signs: tachycardic in ED. Lab findings: COVID negative. Urine drug screen: +benzodiazepines. Alcohol level: 0.276. Pregnancy test: negative.     PLAN:    --Disposition: Admit to detox.     --Medications:  1) Alcohol withdrawal protocol.  2) Continue home medications as restarted in ED.          Shreyas Tan MD  Psychiatrist

## 2022-01-13 NOTE — PROGRESS NOTES
[place] Prescription Monitoring Program  Evelyn Hudson, 51F  Refine Search  Contact the [facility]  YOB: 1970  Recent Address:  View Linked Records (2)  Other Tools/Metrics  NarxCare  Report generated on 01/13/2022. Report Date Range: 01/13/2021 - 01/13/2022  [place]  Narx Scores  Narcotic  050  Sedative  080  Stimulant  000  Explanation and Guidance  Overdose Risk Score  030  (Range 000-999)  Explanation and Guidance  State Indicators (0)  Details  RX Graph   Narcotic   Buprenorphine   Sedative   Stimulant   Other  Learn how to use graph  All Prescribers  Prescribers  2 - Maribell AIME Galo  1 - Joy Bach  Timeline  01/13  2m  6m  1y  2y  Disclaimer  Morphine Milligram Equivalent Prescribed Over Time  Last 30 Days  Last 60 Days  Last 90 Days  Last 1 Year  Last 2 Years  MME  Timeframe  0  1  2  12/15/21  12/30/21  1/13/22  0  MME per Day Avg.  0  MME per RX  Disclaimer  Lorazepam MgEq (LME) Prescribed Over Time  Last 30 Days  Last 60 Days  Last 90 Days  Last 1 Year  Last 2 Years  LME  Timeframe  0  1  2  12/15/21  12/30/21  1/13/22  0.2  LME Per Day Avg.  5  LME mg Per Rx  Disclaimer  Buprenorphine (mg) Prescribed Over Time  Last 30 Days  Last 60 Days  Last 90 Days  Last 1 Year  Last 2 Years  mg  Timeframe  0  1  2  12/15/21  12/30/21  1/13/22  0  mg Per Day Avg.  0  Avg mg Per Rx  Disclaimer  RX Summary  Summary  Total Prescriptions 7  Total Private Pay 0  Total Prescribers 2  Total Pharmacies 2  Opioids* (excluding Buprenorphine)  Current Qty 0  Current MME/day 0.00  30 Day Avg MME/day 0.00  Buprenorphine*  Current Qty 0  Current mg/day 0.00  30 Day Avg mg/day 0.00  RX Summary Expanded  Narcotics (excluding Buprenorphine)  30 Day Avg. MME 0.00  90 Day Avg. MME 0.00  Rx Count/12 Months 0  Prescriber #/6 Months 0  Pharmacy #/6 Months 0  Current Quantity 0  Buprenorphine  30 Day Avg. mg/day 0.00  90 Day Avg. mg/day 0.00  Rx Count/12 Months 0  Prescriber #/6  Months 0  Pharmacy #/6 Months 0  Current Quantity 0  Sedatives  30 Day Avg. LME 0.17  90 Day Avg. LME 0.06  Rx Count/12 Months 1  Prescriber #/6 Months 1  Pharmacy #/6 Months 1  Current Quantity 0  Stimulants  30 Day Avg. mg/day 0.00  90 Day Avg. mg/day 0.00  Rx Count/12 Months 0  Prescriber #/6 Months 0  Pharmacy #/6 Months 0  Current Quantity 0  Prescriptions  Total: 7  Private Pay: 0  Showing 1-7 of 7 Items View 15 Items  1 of 1   Filled  Written  Sold  ID  Drug  QTY  Days  Prescriber  RX #  Dispenser  Refill  Daily Dose*  Pymt Type     12/30/2021 12/29/2021 12/30/2021 2   Diazepam 5 Mg Tablet  10.00 3 Za Lisa [id number] Hy- (9074) 0/0 1.67 LME Medicaid [place]  12/06/2021 12/03/2021 12/06/2021 1 *   Wal-Itin D 10-240mg Tab  30.00 30 Be Wod [id number] Wal (4596) 0/0  Comm Ins [place]  09/07/2021 04/01/2021 09/08/2021 1   Wal-Itin D 24 Hour Tablet  30.00 30 Be Wod [id number] Wal (4596) 0/0  [place]  06/19/2021 04/01/2021 06/19/2021 1   Claritin-D 24 Hour Tablet  30.00 30 Be Wod [id number] Wal (4596) 0/1  [place]  04/01/2021 04/01/2021 04/01/2021 1   Wal-Itin D 24 Hour Tablet  30.00 30 Be Wod [id number] Wal (4596) 0/2  [place]  02/16/2021 11/23/2020 02/17/2021 1   Wal-Itin D 24 Hour Tablet  20.00 20 Be Wod [id number] Wal (4596) 3/2  [place]  01/22/2021 11/23/2020 01/22/2021 1   Wal-Itin D 24 Hour Tablet  30.00 30 Be Wod [id number] Wal (4596) 2/2  [place]  Disclaimer  Showing 1-7 of 7 Items View 15 Items  1 of 1   Providers  Total: 2  Showing 1-2 of 2 Items View 15 Items  1 of 1   Name  Address  City  State  Zipcode  Phone   Maribell AIME Betancur, [place] [address] [place] [address] [phone]  Joy Bahc [address] [facility] [address] [facility] [address] [phone]  Showing 1-2 of 2 Items View 15 Items  1 of 1   Pharmacies  Total: 2  Showing 1-2 of 2 Items View 15 Items  1 of 1   Name  Address  City  State  Zipcode  Phone   [employer] [phone]) [phone] [place] [address] [phone]  [employer]  [phone]) [phone] [place] [address] [phone]  Showing 1-2 of 2 Items View 15 Items  1 of 1   The report provided is based upon the search criteria entered and the corresponding data as it has been reported by dispenser(s). If erroneous information is identified or additional information is needed, please contact the dispenser or the prescriber provided on the report. Date Sold signifies the date the prescription was sold (left the pharmacy). The absence of Date Sold does not necessarily indicate the prescription was not dispensed. Fill Date represents the date the medication was filled or prepared by the pharmacy. Note, federal regulation (CFR Title 42: Part 2) requires patient consent prior to releasing certain patient data from federally funded opioid treatment programs (OTPs). As such, controlled substances dispensed from OTPs for medication-assisted treatment may not appear in the [place]  report. Morphine milligram equivalent (MME) conversion factors published by the CDC are used in the MME calculation. Per the CDC, the MME conversion factor is intended only for analytic purposes where prescription data are used to retrospectively calculate daily MME to inform analyses of risks associated with opioid prescribing. This value does not constitute clinical guidance or recommendations for converting patients from one form of opioid analgesic to another. Per the CDC, the conversion factors for drugs prescribed or provided, as part of medication-assisted treatment for opioid use disorder should not be used to benchmark against MME dosage thresholds meant for opioids prescribed for pain. Buprenorphine products listed in the CDC s MME file do not have an associated conversion factor. Lastly, the CDC notes, in clinical practice, calculating MME for methadone often involves a sliding-scale approach, whereby the conversion factor increases with increasing dose. The conversion factor of 3 for methadone presented in  this file could underestimate MME for a given patient. This report contains confidential information, including patient identifiers, and is not a public record. The information on this report must be treated as protected health information and is only to be disclosed to others as authorized by applicable state and Federal regulations.

## 2022-01-13 NOTE — ED TRIAGE NOTES
Patient states that she is intoxicated and here for detox, patient states to drink 9-12 white claws a day, history of seizures from withdrawals.

## 2022-01-13 NOTE — PROGRESS NOTES
22 0512   Patient Belongings   Did you bring any home meds/supplements to the hospital?  Yes   Disposition of meds  Sent to security/pharmacy per site process   Patient Belongings remains with patient;locker   Patient Belongings Remaining with Patient clothing;glasses   Patient Belongings Put in Hospital Secure Location (Security or Locker, etc.) cash/credit card;cell phone/electronics;clothing;keys;money (see comment);purse/wallet;wallet;shoes   Belongings Search Yes   Clothing Search Yes   Second Staff Dilia JEAN and Sharonda PRINCE   Comment All of patient's belongings are in a bin, her discharge bin and her room. Her important cards, passport and cash of $160.00 are in a security envelop.     BIN:  Coat, shoes and socks, a red purse with a wallet, perfume, cigarettes, lighters, chapstick, candy, and other personal stuff. A black purse with clothing and some personal stuff.starfish necklace    DISCHARGE BIN:  Phone and keys    ROOM:  Some clothing and glasses    SECURITY ENV # [id number] -  [employer],  [place] 's License, [facility]  Card, MasterCard and Cash of $160.00.    A               Admission:  I am responsible for any personal items that are not sent to the safe or pharmacy.  [facility] is not responsible for loss, theft or damage of any property in my possession.    Signature:  _________________________________ Date: _______  Time: _____                                              Staff Signature:  ____________________________ Date: ________  Time: _____      2nd Staff person, if patient is unable/unwilling to sign:    Signature: ________________________________ Date: ________  Time: _____     Discharge:  [facility] has returned all of my personal belongings:    Signature: _________________________________ Date: ________  Time: _____                                          Staff Signature:  ____________________________ Date: ________  Time: _____

## 2022-01-13 NOTE — PROGRESS NOTES
Met with pt for discharge planning.  Pt is requesting referral to [facility].  Pt reports assessment completed at [facility] recently.  Pt signed KANU and request for assessment faxed to [facility].  Pt has [facility].

## 2022-01-14 PROCEDURE — H2032 ACTIVITY THERAPY, PER 15 MIN: HCPCS

## 2022-01-14 PROCEDURE — 99232 SBSQ HOSP IP/OBS MODERATE 35: CPT | Performed by: PSYCHIATRY & NEUROLOGY

## 2022-01-14 PROCEDURE — 250N000013 HC RX MED GY IP 250 OP 250 PS 637: Performed by: PSYCHIATRY & NEUROLOGY

## 2022-01-14 PROCEDURE — 250N000011 HC RX IP 250 OP 636: Performed by: PSYCHIATRY & NEUROLOGY

## 2022-01-14 PROCEDURE — 250N000013 HC RX MED GY IP 250 OP 250 PS 637: Performed by: EMERGENCY MEDICINE

## 2022-01-14 PROCEDURE — H2035 A/D TX PROGRAM, PER HOUR: HCPCS | Mod: HQ

## 2022-01-14 PROCEDURE — 128N000004 HC R&B CD ADULT

## 2022-01-14 PROCEDURE — 0013A HC ADMIN COVID VAC MODERNA, 3RD DOSE IMM COMP PT: CPT | Performed by: PSYCHIATRY & NEUROLOGY

## 2022-01-14 PROCEDURE — 91301 HC RX IP 250 OP 636: CPT | Performed by: PSYCHIATRY & NEUROLOGY

## 2022-01-14 PROCEDURE — H0001 ALCOHOL AND/OR DRUG ASSESS: HCPCS

## 2022-01-14 RX ORDER — DIPHENHYDRAMINE HCL 50 MG
50 CAPSULE ORAL
Status: DISCONTINUED | OUTPATIENT
Start: 2022-01-14 | End: 2022-01-17 | Stop reason: HOSPADM

## 2022-01-14 RX ORDER — DIPHENHYDRAMINE HYDROCHLORIDE 50 MG/ML
50 INJECTION INTRAMUSCULAR; INTRAVENOUS
Status: DISCONTINUED | OUTPATIENT
Start: 2022-01-14 | End: 2022-01-17 | Stop reason: HOSPADM

## 2022-01-14 RX ADMIN — NICOTINE POLACRILEX 2 MG: 2 GUM, CHEWING ORAL at 10:43

## 2022-01-14 RX ADMIN — DIAZEPAM 10 MG: 5 TABLET ORAL at 16:13

## 2022-01-14 RX ADMIN — NICOTINE POLACRILEX 2 MG: 2 GUM, CHEWING ORAL at 12:46

## 2022-01-14 RX ADMIN — NICOTINE 1 PATCH: 21 PATCH, EXTENDED RELEASE TRANSDERMAL at 08:42

## 2022-01-14 RX ADMIN — DIAZEPAM 10 MG: 5 TABLET ORAL at 12:34

## 2022-01-14 RX ADMIN — HYDROXYZINE HYDROCHLORIDE 25 MG: 25 TABLET, FILM COATED ORAL at 12:46

## 2022-01-14 RX ADMIN — DIAZEPAM 10 MG: 5 TABLET ORAL at 01:08

## 2022-01-14 RX ADMIN — NICOTINE POLACRILEX 2 MG: 2 GUM, CHEWING ORAL at 21:43

## 2022-01-14 RX ADMIN — DIAZEPAM 5 MG: 5 TABLET ORAL at 08:40

## 2022-01-14 RX ADMIN — NICOTINE POLACRILEX 2 MG: 2 GUM, CHEWING ORAL at 18:45

## 2022-01-14 RX ADMIN — FOLIC ACID 1 MG: 1 TABLET ORAL at 08:41

## 2022-01-14 RX ADMIN — TRAZODONE HYDROCHLORIDE 100 MG: 50 TABLET ORAL at 22:05

## 2022-01-14 RX ADMIN — HYDROXYZINE HYDROCHLORIDE 25 MG: 25 TABLET, FILM COATED ORAL at 20:18

## 2022-01-14 RX ADMIN — CX-024414 0.25 ML: 0.2 INJECTION, SUSPENSION INTRAMUSCULAR at 12:48

## 2022-01-14 RX ADMIN — MULTIPLE VITAMINS W/ MINERALS TAB 1 TABLET: TAB at 08:40

## 2022-01-14 RX ADMIN — THIAMINE HCL TAB 100 MG 100 MG: 100 TAB at 08:40

## 2022-01-14 RX ADMIN — LEVETIRACETAM 500 MG: 500 TABLET, FILM COATED ORAL at 08:40

## 2022-01-14 RX ADMIN — DIAZEPAM 10 MG: 5 TABLET ORAL at 04:30

## 2022-01-14 RX ADMIN — HYDROXYZINE HYDROCHLORIDE 25 MG: 25 TABLET, FILM COATED ORAL at 16:13

## 2022-01-14 RX ADMIN — LEVETIRACETAM 500 MG: 500 TABLET, FILM COATED ORAL at 20:18

## 2022-01-14 RX ADMIN — LOPERAMIDE HYDROCHLORIDE 2 MG: 2 CAPSULE ORAL at 10:43

## 2022-01-14 RX ADMIN — NICOTINE POLACRILEX 2 MG: 2 GUM, CHEWING ORAL at 08:42

## 2022-01-14 RX ADMIN — HYDROXYZINE HYDROCHLORIDE 25 MG: 25 TABLET, FILM COATED ORAL at 08:40

## 2022-01-14 RX ADMIN — ESCITALOPRAM OXALATE 20 MG: 10 TABLET ORAL at 08:40

## 2022-01-14 RX ADMIN — ATENOLOL 50 MG: 50 TABLET ORAL at 20:18

## 2022-01-14 ASSESSMENT — ACTIVITIES OF DAILY LIVING (ADL)
HYGIENE/GROOMING: HANDWASHING;INDEPENDENT
ORAL_HYGIENE: INDEPENDENT
DRESS: STREET CLOTHES;INDEPENDENT

## 2022-01-14 NOTE — PROGRESS NOTES
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   The patient's care was discussed with the treatment team during the daily team meeting and/or staff's chart notes were reviewed.  Staff report patient has been visible in the milieu,  no acute eventsovernight.     Patient's mood is stressed  Energy Level:LOW  Sleep:No concerns, sleeps well through night  Appetite:fair  Improving motivation interest   deneid any Suicidal/homicidal ideation/plan intent.  Denied any psychosis  No prior suicde attempts  No access to gun  Pt is in alcohol withdrawal still being monitered every 4 hrs for it,   Pt mssa score are monitered  Tolerating meds and has no side effects.              Medications:     Current Facility-Administered Medications   Medication     acetaminophen (TYLENOL) tablet 650 mg     alum & mag hydroxide-simethicone (MAALOX) suspension 30 mL     atenolol (TENORMIN) tablet 50 mg     [START ON 1/15/2022] COVID-19 mRNA vacc (Moderna) injection 0.25 mL     cyclobenzaprine (FLEXERIL) tablet 10 mg     diazepam (VALIUM) tablet 5-20 mg     diphenhydrAMINE (BENADRYL) capsule 50 mg    Or     diphenhydrAMINE (BENADRYL) injection 50 mg     EPINEPHrine (ADRENALIN) kit 0.3 mg     escitalopram (LEXAPRO) tablet 20 mg     folic acid (FOLVITE) tablet 1 mg     hydrOXYzine (ATARAX) tablet 25 mg     levETIRAcetam (KEPPRA) tablet 500 mg     loperamide (IMODIUM) capsule 2 mg     loratadine (CLARITIN) tablet 10 mg     multivitamin w/minerals (THERA-VIT-M) tablet 1 tablet     nicotine (NICODERM CQ) 21 MG/24HR 24 hr patch 1 patch     nicotine (NICORETTE) gum 2 mg     nicotine Patch in Place     ondansetron (ZOFRAN-ODT) ODT tab 4 mg     senna-docusate  "(SENOKOT-S/PERICOLACE) 8.6-50 MG per tablet 1 tablet     thiamine (B-1) tablet 100 mg     traZODone (DESYREL) tablet 50 mg     traZODone (DESYREL) tablet  mg             Allergies:     Allergies   Allergen Reactions     Ciprofloxacin Anaphylaxis     Throat swelling            Psychiatric Examination:   Blood pressure 104/71, pulse 90, temperature 97.3  F (36.3  C), temperature source Temporal, resp. rate 16, height 1.575 m (5' 2\"), weight 65.5 kg (144 lb 8 oz), SpO2 97 %.  Weight is 144 lbs 8 oz  Body mass index is 26.43 kg/m .    Appearance:  awake, alert and adequately groomed  Attitude:  cooperative  Eye Contact:  good  Mood:  sad   Affect:  appropriate and in normal range and mood congruent  Speech:  clear, coherent rate /rhythm are good  Psychomotor Behavior:  no evidence of tardive dyskinesia, dystonia, or tics and intact station, gait and muscle tone  Throught Process:  logical  Associations:  no loose associations  Thought Content:  no evidence of suicidal ideation or homicidal ideation, no evidence of psychotic thought, no auditory hallucinations present and no visual hallucinations present  Insight:  limited  Judgement:  intact  Oriented to:  time, person, and place  Attention Span and Concentration:  intact  Recent and Remote Memory:  intact  Language fund of knowledge are adequate         Labs:   No results found for this or any previous visit (from the past 24 hour(s)).      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 Continue the same MSSA protocol as ordered. Continue thiamine 100 mg p.o. daily, M.V.I. one p.o. daily and folate 1 mg p.o. Daily  Will continue mssa protocal to detox off alcohol on valium,  Pt is c/o of termor , agitation poor sleep and poor " appetite, he has sweats, feels shakey  On mssa client scored 8scored today and  needed needed 5 mg po as of yet , total dose since admission was 95mg     MSSA    Eating Disturbances: ate and enjoyed all of it or not applicable  Tremor: 2  Sleep Disturbance: slept through the night or not applicable  Clouding of Sensorium: no evidence  Hallucinations: 0 - none  Quality of Contact: 0 - awareness of examiner and people around him/her  Agitation: 1 - somewhat more than normal activity  Paroxysmal Sweats: 1 - barely perceptible sweating  Temperature: 99.5 or below  Pulse: 3 - 90 to 99  Total MSSA Score: 8    Continue lexapro   Pt wanted  get the booster for covid  vaccine   moderna booster Ordered  Laboratory/Imaging: reviewed with patient   Consults: internal medicine consult reviewed  Patient will be treated in therapeutic milieu with appropriate individual and group therapies as described.  PDMP CHECKED     Supportive psychotheraoy provided, nicole talked about recovery enviroment, relapse prevention, triggers to use.  Discussed with patient many issues of addiction,triggers, relapse, and establishing a solid recovery program.  Asked pt to be med complinat   Medical diagnoses to be addressed this admission:    Plan:  Assessment and Recommendations:   Evelyn Hudson is a 51 year old year old woman with a history of alcohol use disorder, withdrawal seizures, HTN, depression who was admitted to station [facility] seeking detoxification from alcohol.     Alcohol withdrawal   Alcohol use disorder  Hx of withdrawal seizures  Last drink on 1/12. She does have a history of withdrawal seizures, most recent seizure occure din September 2020. Is maintained on Keppra prophylaxis. Follows with Neurology and was planning for Keppra taper. MSSA 10 this shift. Mag level wnl.    - Continue on MSSA protocol with benzodiazepines as indicated   - Continue Keppra 500 mg BID. Instructed patient to continue this dose until she can follow-up with  Neurology.    - Seizure precautions   - Management per Psychiatry   - Agree with MVI, folic acid, and thiamine supplements   - Notify medicine for SBP >180 or DBP >110     Hypertension: Patient reports being on metoprolol and is unsure of the dose. Reviewed with Pharmacy, there are no fills of metoprolol within the past 1 year. BP is only slightly elevated at this time (SBP in 130s).   - Will hold on ordering metoprolol as it is unclear if she is actually prescribed this   - Continue to monitor blood pressure   - She will need to follow-up with primary care provider within 1 week of discharge for BP recheck     Hypokalemia, resolved: K 3.3 in ED, normalized wiith replacement.      Transaminitis: ALT 53, AST wnl, Tbili wnl, alk phos 188. Prior labs with similar elevations. 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 Asymptomatic.    - Recommend repeat hepatic panel in 1-2 weeks with PCP     Hyperlipidemia: Total cholesterol 250, , . However, was not fasting for cholesterol panel.   - Recommend repeat fasting lipid panel with PCP     Low TSH: TSH 0.26, T4 wnl. No prior history of thyroid dysfunction.    - Recommend repeat TFTs in 4-6 weeks with PCP     Currently the patient is medically stable and Medicine will sign off. Please do not hesitate to contact if new questions or concerns arise.       Legal Status: voluntary    Safety Assessment:   Checks:  15 min  Precautions: withdrawal precautions  Pt has not required locked seclusion or restraints in the past 24 hours to maintain safety, please refer to RN documentation for further details.  Discussed with patient many issues of addiction,triggers, relapse, and establishing a solid recovery program.  Able to give informed consent:  YES   Discussed Risks/Benefits/Side Effects/Alternatives: YES    After discussion of the indications, risks, benefits, alternatives and consequences of no treatment, the patient elects to complete detox and do trt

## 2022-01-14 NOTE — PROGRESS NOTES
Pt was given the COVID vaccine in her left arm . Pt educated and monitor for 30 minutes . No reaction noted
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
## 2022-01-14 NOTE — PROGRESS NOTES
Pt approached writer and informed that she believes she completed an assessment at [facility] and [facility] in [place].  States she was scheduled to begin IOP with them but missed her start date.  Pt has now decided she wants to attend IOP at [facility] and [facility].  Provided Pt with number for program and instructed to call to establish start date.  Pt acknowledged.  Pt spoke with [facility].  Her appointment was for an assessment.  Pt in tears feeling overwhelmed.  Reassured Pt and coached her to complete paperwork for assessment and referral.

## 2022-01-14 NOTE — PROGRESS NOTES
Met with Pt and completed assessment.  Pt signed [place] and assessment faxed to [facility].  Pt to follow up from home or on Monday if still here.

## 2022-01-15 LAB — SARS-COV-2 RNA RESP QL NAA+PROBE: NEGATIVE

## 2022-01-15 PROCEDURE — 128N000004 HC R&B CD ADULT

## 2022-01-15 PROCEDURE — H2032 ACTIVITY THERAPY, PER 15 MIN: HCPCS

## 2022-01-15 PROCEDURE — 250N000013 HC RX MED GY IP 250 OP 250 PS 637: Performed by: PSYCHIATRY & NEUROLOGY

## 2022-01-15 PROCEDURE — U0005 INFEC AGEN DETEC AMPLI PROBE: HCPCS | Performed by: PHYSICIAN ASSISTANT

## 2022-01-15 PROCEDURE — 250N000013 HC RX MED GY IP 250 OP 250 PS 637: Performed by: EMERGENCY MEDICINE

## 2022-01-15 PROCEDURE — 250N000013 HC RX MED GY IP 250 OP 250 PS 637: Performed by: PHYSICIAN ASSISTANT

## 2022-01-15 RX ORDER — CARBOXYMETHYLCELLULOSE SODIUM 5 MG/ML
1 SOLUTION/ DROPS OPHTHALMIC
Status: DISCONTINUED | OUTPATIENT
Start: 2022-01-15 | End: 2022-01-17 | Stop reason: HOSPADM

## 2022-01-15 RX ORDER — IBUPROFEN 600 MG/1
600 TABLET, FILM COATED ORAL ONCE
Status: COMPLETED | OUTPATIENT
Start: 2022-01-15 | End: 2022-01-15

## 2022-01-15 RX ADMIN — NICOTINE 1 PATCH: 21 PATCH, EXTENDED RELEASE TRANSDERMAL at 08:43

## 2022-01-15 RX ADMIN — CYCLOBENZAPRINE 10 MG: 10 TABLET, FILM COATED ORAL at 16:20

## 2022-01-15 RX ADMIN — HYDROXYZINE HYDROCHLORIDE 25 MG: 25 TABLET, FILM COATED ORAL at 12:05

## 2022-01-15 RX ADMIN — CARBOXYMETHYLCELLULOSE SODIUM 1 DROP: 5 SOLUTION/ DROPS OPHTHALMIC at 10:43

## 2022-01-15 RX ADMIN — LEVETIRACETAM 500 MG: 500 TABLET, FILM COATED ORAL at 20:17

## 2022-01-15 RX ADMIN — NICOTINE POLACRILEX 2 MG: 2 GUM, CHEWING ORAL at 12:05

## 2022-01-15 RX ADMIN — FOLIC ACID 1 MG: 1 TABLET ORAL at 08:40

## 2022-01-15 RX ADMIN — ESCITALOPRAM OXALATE 20 MG: 10 TABLET ORAL at 08:40

## 2022-01-15 RX ADMIN — DIAZEPAM 10 MG: 5 TABLET ORAL at 08:40

## 2022-01-15 RX ADMIN — NICOTINE POLACRILEX 2 MG: 2 GUM, CHEWING ORAL at 08:40

## 2022-01-15 RX ADMIN — MULTIPLE VITAMINS W/ MINERALS TAB 1 TABLET: TAB at 08:40

## 2022-01-15 RX ADMIN — ACETAMINOPHEN 650 MG: 325 TABLET, FILM COATED ORAL at 20:17

## 2022-01-15 RX ADMIN — THIAMINE HCL TAB 100 MG 100 MG: 100 TAB at 08:40

## 2022-01-15 RX ADMIN — NICOTINE POLACRILEX 2 MG: 2 GUM, CHEWING ORAL at 13:12

## 2022-01-15 RX ADMIN — IBUPROFEN 600 MG: 600 TABLET, FILM COATED ORAL at 10:44

## 2022-01-15 RX ADMIN — TRAZODONE HYDROCHLORIDE 100 MG: 50 TABLET ORAL at 21:41

## 2022-01-15 RX ADMIN — HYDROXYZINE HYDROCHLORIDE 25 MG: 25 TABLET, FILM COATED ORAL at 08:40

## 2022-01-15 RX ADMIN — NICOTINE POLACRILEX 2 MG: 2 GUM, CHEWING ORAL at 19:05

## 2022-01-15 RX ADMIN — HYDROXYZINE HYDROCHLORIDE 25 MG: 25 TABLET, FILM COATED ORAL at 16:19

## 2022-01-15 RX ADMIN — ACETAMINOPHEN 650 MG: 325 TABLET, FILM COATED ORAL at 08:40

## 2022-01-15 RX ADMIN — NICOTINE POLACRILEX 2 MG: 2 GUM, CHEWING ORAL at 16:19

## 2022-01-15 RX ADMIN — LEVETIRACETAM 500 MG: 500 TABLET, FILM COATED ORAL at 08:40

## 2022-01-15 RX ADMIN — NICOTINE POLACRILEX 2 MG: 2 GUM, CHEWING ORAL at 20:17

## 2022-01-15 RX ADMIN — HYDROXYZINE HYDROCHLORIDE 25 MG: 25 TABLET, FILM COATED ORAL at 21:41

## 2022-01-15 ASSESSMENT — ACTIVITIES OF DAILY LIVING (ADL)
ORAL_HYGIENE: INDEPENDENT
HYGIENE/GROOMING: HANDWASHING;INDEPENDENT

## 2022-01-16 PROCEDURE — 128N000004 HC R&B CD ADULT

## 2022-01-16 PROCEDURE — 250N000013 HC RX MED GY IP 250 OP 250 PS 637: Performed by: EMERGENCY MEDICINE

## 2022-01-16 PROCEDURE — 250N000013 HC RX MED GY IP 250 OP 250 PS 637: Performed by: PSYCHIATRY & NEUROLOGY

## 2022-01-16 PROCEDURE — 250N000013 HC RX MED GY IP 250 OP 250 PS 637: Performed by: PHYSICIAN ASSISTANT

## 2022-01-16 RX ORDER — IBUPROFEN 600 MG/1
600 TABLET, FILM COATED ORAL ONCE
Status: COMPLETED | OUTPATIENT
Start: 2022-01-16 | End: 2022-01-16

## 2022-01-16 RX ADMIN — NICOTINE POLACRILEX 2 MG: 2 GUM, CHEWING ORAL at 18:53

## 2022-01-16 RX ADMIN — NICOTINE POLACRILEX 2 MG: 2 GUM, CHEWING ORAL at 14:38

## 2022-01-16 RX ADMIN — ESCITALOPRAM OXALATE 20 MG: 10 TABLET ORAL at 08:50

## 2022-01-16 RX ADMIN — LEVETIRACETAM 500 MG: 500 TABLET, FILM COATED ORAL at 21:41

## 2022-01-16 RX ADMIN — HYDROXYZINE HYDROCHLORIDE 25 MG: 25 TABLET, FILM COATED ORAL at 08:49

## 2022-01-16 RX ADMIN — NICOTINE POLACRILEX 2 MG: 2 GUM, CHEWING ORAL at 13:17

## 2022-01-16 RX ADMIN — ACETAMINOPHEN 650 MG: 325 TABLET, FILM COATED ORAL at 16:04

## 2022-01-16 RX ADMIN — HYDROXYZINE HYDROCHLORIDE 25 MG: 25 TABLET, FILM COATED ORAL at 21:41

## 2022-01-16 RX ADMIN — LORATADINE 10 MG: 10 TABLET ORAL at 18:54

## 2022-01-16 RX ADMIN — IBUPROFEN 600 MG: 600 TABLET ORAL at 08:54

## 2022-01-16 RX ADMIN — NICOTINE 1 PATCH: 21 PATCH, EXTENDED RELEASE TRANSDERMAL at 08:51

## 2022-01-16 RX ADMIN — NICOTINE POLACRILEX 2 MG: 2 GUM, CHEWING ORAL at 08:50

## 2022-01-16 RX ADMIN — TRAZODONE HYDROCHLORIDE 50 MG: 50 TABLET ORAL at 21:41

## 2022-01-16 RX ADMIN — LEVETIRACETAM 500 MG: 500 TABLET, FILM COATED ORAL at 08:49

## 2022-01-16 RX ADMIN — FOLIC ACID 1 MG: 1 TABLET ORAL at 08:49

## 2022-01-16 RX ADMIN — ATENOLOL 50 MG: 50 TABLET ORAL at 08:50

## 2022-01-16 RX ADMIN — ACETAMINOPHEN 650 MG: 325 TABLET, FILM COATED ORAL at 05:59

## 2022-01-16 RX ADMIN — CARBOXYMETHYLCELLULOSE SODIUM 1 DROP: 5 SOLUTION/ DROPS OPHTHALMIC at 08:50

## 2022-01-16 RX ADMIN — HYDROXYZINE HYDROCHLORIDE 25 MG: 25 TABLET, FILM COATED ORAL at 18:39

## 2022-01-16 RX ADMIN — THIAMINE HCL TAB 100 MG 100 MG: 100 TAB at 08:49

## 2022-01-16 RX ADMIN — CYCLOBENZAPRINE 10 MG: 10 TABLET, FILM COATED ORAL at 11:03

## 2022-01-16 RX ADMIN — NICOTINE POLACRILEX 2 MG: 2 GUM, CHEWING ORAL at 21:45

## 2022-01-16 RX ADMIN — NICOTINE POLACRILEX 2 MG: 2 GUM, CHEWING ORAL at 16:04

## 2022-01-16 RX ADMIN — TRAZODONE HYDROCHLORIDE 50 MG: 50 TABLET ORAL at 21:42

## 2022-01-16 RX ADMIN — NICOTINE POLACRILEX 2 MG: 2 GUM, CHEWING ORAL at 20:11

## 2022-01-16 RX ADMIN — HYDROXYZINE HYDROCHLORIDE 25 MG: 25 TABLET, FILM COATED ORAL at 13:17

## 2022-01-16 RX ADMIN — NICOTINE POLACRILEX 2 MG: 2 GUM, CHEWING ORAL at 11:03

## 2022-01-16 RX ADMIN — MULTIPLE VITAMINS W/ MINERALS TAB 1 TABLET: TAB at 08:49

## 2022-01-16 RX ADMIN — NICOTINE POLACRILEX 2 MG: 2 GUM, CHEWING ORAL at 12:03

## 2022-01-16 RX ADMIN — CYCLOBENZAPRINE 10 MG: 10 TABLET, FILM COATED ORAL at 18:39

## 2022-01-16 ASSESSMENT — ACTIVITIES OF DAILY LIVING (ADL)
DRESS: STREET CLOTHES;INDEPENDENT
HYGIENE/GROOMING: HANDWASHING;INDEPENDENT
ORAL_HYGIENE: INDEPENDENT

## 2022-01-17 VITALS
HEART RATE: 108 BPM | BODY MASS INDEX: 26.59 KG/M2 | TEMPERATURE: 97.6 F | OXYGEN SATURATION: 99 % | HEIGHT: 62 IN | DIASTOLIC BLOOD PRESSURE: 79 MMHG | SYSTOLIC BLOOD PRESSURE: 125 MMHG | RESPIRATION RATE: 16 BRPM | WEIGHT: 144.5 LBS

## 2022-01-17 PROCEDURE — 250N000013 HC RX MED GY IP 250 OP 250 PS 637: Performed by: PSYCHIATRY & NEUROLOGY

## 2022-01-17 PROCEDURE — 250N000013 HC RX MED GY IP 250 OP 250 PS 637: Performed by: EMERGENCY MEDICINE

## 2022-01-17 PROCEDURE — 99239 HOSP IP/OBS DSCHRG MGMT >30: CPT | Performed by: PSYCHIATRY & NEUROLOGY

## 2022-01-17 RX ORDER — NALTREXONE HYDROCHLORIDE 50 MG/1
50 TABLET, FILM COATED ORAL DAILY
Qty: 30 TABLET | Refills: 0 | Status: ON HOLD
Start: 2022-01-17 | End: 2022-02-03

## 2022-01-17 RX ORDER — CYCLOBENZAPRINE HCL 10 MG
10 TABLET ORAL 3 TIMES DAILY PRN
Qty: 20 TABLET | Refills: 0 | Status: SHIPPED | OUTPATIENT
Start: 2022-01-17

## 2022-01-17 RX ORDER — ESCITALOPRAM OXALATE 20 MG/1
20 TABLET ORAL DAILY
Qty: 30 TABLET | Refills: 0 | Status: SHIPPED | OUTPATIENT
Start: 2022-01-17

## 2022-01-17 RX ORDER — TRAZODONE HYDROCHLORIDE 50 MG/1
50-100 TABLET, FILM COATED ORAL AT BEDTIME
Qty: 60 TABLET | Refills: 0 | Status: SHIPPED | OUTPATIENT
Start: 2022-01-17

## 2022-01-17 RX ORDER — LANOLIN ALCOHOL/MO/W.PET/CERES
100 CREAM (GRAM) TOPICAL DAILY
Qty: 30 TABLET | Refills: 0 | Status: SHIPPED | OUTPATIENT
Start: 2022-01-18

## 2022-01-17 RX ORDER — LEVETIRACETAM 500 MG/1
500 TABLET ORAL 2 TIMES DAILY
Qty: 60 TABLET | Refills: 0 | Status: SHIPPED | OUTPATIENT
Start: 2022-01-17

## 2022-01-17 RX ADMIN — NICOTINE 1 PATCH: 21 PATCH, EXTENDED RELEASE TRANSDERMAL at 08:30

## 2022-01-17 RX ADMIN — ESCITALOPRAM OXALATE 20 MG: 10 TABLET ORAL at 08:29

## 2022-01-17 RX ADMIN — HYDROXYZINE HYDROCHLORIDE 25 MG: 25 TABLET, FILM COATED ORAL at 08:41

## 2022-01-17 RX ADMIN — ACETAMINOPHEN 650 MG: 325 TABLET, FILM COATED ORAL at 11:47

## 2022-01-17 RX ADMIN — THIAMINE HCL TAB 100 MG 100 MG: 100 TAB at 08:29

## 2022-01-17 RX ADMIN — MULTIPLE VITAMINS W/ MINERALS TAB 1 TABLET: TAB at 08:29

## 2022-01-17 RX ADMIN — FOLIC ACID 1 MG: 1 TABLET ORAL at 08:29

## 2022-01-17 RX ADMIN — LEVETIRACETAM 500 MG: 500 TABLET, FILM COATED ORAL at 08:29

## 2022-01-17 RX ADMIN — NICOTINE POLACRILEX 2 MG: 2 GUM, CHEWING ORAL at 10:50

## 2022-01-17 RX ADMIN — NICOTINE POLACRILEX 2 MG: 2 GUM, CHEWING ORAL at 08:29

## 2022-01-17 NOTE — DISCHARGE SUMMARY
Psychiatric Discharge Summary    Evelyn Hudson MRN# [id number]   Age: 51 year old YOB: 1970     Date of Admission:  1/12/2022  Date of Discharge:  1/17/2022  Admitting Physician:  Kendrick Lira MD  Discharge Physician:  Lis Sanchez MD (Contact: [phone])         Event Leading to Hospitalization:   Per H&P:    HISTORY OF PRESENT ILLNESS:    Patient is a 51-year-old  female who came to the emergency room wanting help for her alcoholism patient reports this is a 3-month relapse..  She is having numerous stressors she believes her boyfriend is cheating on her and she has money problems she has strained relationship with her neighbor.  She is drinking approximately 18 white class when she wakes up she drinks and goes back to sleep.  She is having withdrawals she does have a history of seizures and this is what prompted her to get help.  Alcohol is her drug of choice     Patient has been using the following substances: Alcohol  Started at age 13, became a problem at 20     Patient has tolerance, withdrawal, progressive use, loss of control, spending more time and more amount than intended. Patient has made attempts to quit, is experiencing cravings, and reports negative consequences.     Patient denies any history of DTs or seizures     Denies thoughts of suicide or harming others.       Denies auditory or visual hallucinations.      Patient smokes 2 packs/day     Patient denied any gambling       See Admission note by Kendrick Lira MD on 1/13/22 for additional details.          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  PTSD chronic         Labs:     Recent Results (from the past 168 hour(s))   Alcohol breath test POCT    Collection Time: 01/12/22  6:19 PM   Result Value Ref Range    Alcohol Breath Test 0.276 (A) 0.00 - 0.01    HCG qualitative urine (UPT)    Collection Time: 01/12/22  8:02 PM   Result Value Ref Range    hCG Urine Qualitative Negative Negative   Drug abuse screen 1 urine (ED)    Collection Time: 01/12/22  8:02 PM   Result Value Ref Range    Amphetamines Urine Screen Negative Screen Negative    Barbiturates Urine Screen Negative Screen Negative    Benzodiazepines Urine Screen Positive (A) Screen Negative    Cannabinoids Urine Screen Negative Screen Negative    Cocaine Urine Screen Negative Screen Negative    Opiates Urine Screen Negative Screen Negative   Comprehensive metabolic panel    Collection Time: 01/12/22  8:08 PM   Result Value Ref Range    Sodium 133 133 - 144 mmol/L    Potassium 3.3 (L) 3.4 - 5.3 mmol/L    Chloride 97 94 - 109 mmol/L    Carbon Dioxide (CO2) 29 20 - 32 mmol/L    Anion Gap 7 3 - 14 mmol/L    Urea Nitrogen 6 (L) 7 - 30 mg/dL    Creatinine 0.57 0.52 - 1.04 mg/dL    Calcium 9.1 8.5 - 10.1 mg/dL    Glucose 132 (H) 70 - 99 mg/dL    Alkaline Phosphatase 188 (H) 40 - 150 U/L    AST 53 (H) 0 - 45 U/L    ALT 42 0 - 50 U/L    Protein Total 8.1 6.8 - 8.8 g/dL    Albumin 3.8 3.4 - 5.0 g/dL    Bilirubin Total 0.2 0.2 - 1.3 mg/dL    GFR Estimate >90 >60 mL/min/1.73m2   CBC with platelets and differential    Collection Time: 01/12/22  8:08 PM   Result Value Ref Range    WBC Count 4.7 4.0 - 11.0 10e3/uL    RBC Count 4.29 3.80 - 5.20 10e6/uL    Hemoglobin 14.3 11.7 - 15.7 g/dL    Hematocrit 41.1 35.0 - 47.0 %    MCV 96 78 - 100 fL    MCH 33.3 (H) 26.5 - 33.0 pg    MCHC 34.8 31.5 - 36.5 g/dL    RDW 15.7 (H) 10.0 - 15.0 %    Platelet Count 386 150 - 450 10e3/uL    % Neutrophils 36 %    % Lymphocytes 52 %    % Monocytes 9 %    % Eosinophils 1 %    % Basophils 2 %    % Immature Granulocytes 0 %    NRBCs per 100 WBC 0 <1 /100    Absolute Neutrophils 1.7 1.6 - 8.3 10e3/uL    Absolute Lymphocytes 2.4 0.8 - 5.3 10e3/uL    Absolute Monocytes 0.4 0.0 - 1.3 10e3/uL    Absolute Eosinophils 0.1 0.0 - 0.7 10e3/uL    Absolute  Basophils 0.1 0.0 - 0.2 10e3/uL    Absolute Immature Granulocytes 0.0 <=0.4 10e3/uL    Absolute NRBCs 0.0 10e3/uL   GGT    Collection Time: 01/12/22  8:08 PM   Result Value Ref Range     (H) 0 - 40 U/L   TSH with free T4 reflex    Collection Time: 01/12/22  8:08 PM   Result Value Ref Range    TSH 0.26 (L) 0.40 - 4.00 mU/L   Lipid Profile    Collection Time: 01/12/22  8:08 PM   Result Value Ref Range    Cholesterol 250 (H) <200 mg/dL    Triglycerides 139 <150 mg/dL    Direct Measure  >=50 mg/dL    LDL Cholesterol Calculated 104 (H) <=100 mg/dL    Non HDL Cholesterol 132 (H) <130 mg/dL   T4 free    Collection Time: 01/12/22  8:08 PM   Result Value Ref Range    Free T4 0.83 0.76 - 1.46 ng/dL   Asymptomatic COVID-19 Virus (Coronavirus) by PCR Nasopharyngeal    Collection Time: 01/12/22  8:09 PM    Specimen: Nasopharyngeal; Swab   Result Value Ref Range    SARS CoV2 PCR Negative Negative, Testing sent to reference lab. Results will be returned via unsolicited result   Potassium    Collection Time: 01/13/22  8:35 AM   Result Value Ref Range    Potassium 4.3 3.4 - 5.3 mmol/L   Magnesium    Collection Time: 01/13/22  8:35 AM   Result Value Ref Range    Magnesium 1.9 1.6 - 2.3 mg/dL   Asymptomatic COVID-19 Virus (Coronavirus) by PCR Nasopharyngeal    Collection Time: 01/15/22  8:46 AM    Specimen: Nasopharyngeal; Swab   Result Value Ref Range    SARS CoV2 PCR Negative Negative            Consults:   Consultation during this admission received from internal medicine on 1/13/22:    Evelyn Hudson is a 51 year old year old woman with a history of alcohol use disorder, withdrawal seizures, HTN, depression who was admitted to [facility] seeking detoxification from alcohol.     Alcohol withdrawal   Alcohol use disorder  Hx of withdrawal seizures  Last drink on 1/12. She does have a history of withdrawal seizures, most recent seizure occure din September 2020. Is maintained on Keppra prophylaxis. Follows with  Neurology and was planning for Keppra taper. MSSA 10 this shift. Mag level wnl.    - Continue on MSSA protocol with benzodiazepines as indicated   - Continue Keppra 500 mg BID. Instructed patient to continue this dose until she can follow-up with Neurology.    - Seizure precautions   - Management per Psychiatry   - Agree with MVI, folic acid, and thiamine supplements   - Notify medicine for SBP >180 or DBP >110     Hypertension: Patient reports being on metoprolol and is unsure of the dose. Reviewed with Pharmacy, there are no fills of metoprolol within the past 1 year. BP is only slightly elevated at this time (SBP in 130s).   - Will hold on ordering metoprolol as it is unclear if she is actually prescribed this   - Continue to monitor blood pressure   - She will need to follow-up with primary care provider within 1 week of discharge for BP recheck     Hypokalemia, resolved: K 3.3 in ED, normalized wiith replacement.      Transaminitis: ALT 53, AST wnl, Tbili wnl, alk phos 188. Prior labs with similar elevations. 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 Asymptomatic.    - Recommend repeat hepatic panel in 1-2 weeks with PCP     Hyperlipidemia: Total cholesterol 250, , . However, was not fasting for cholesterol panel.   - Recommend repeat fasting lipid panel with PCP     Low TSH: TSH 0.26, T4 wnl. No prior history of thyroid dysfunction.    - Recommend repeat TFTs in 4-6 weeks with PCP     Currently the patient is medically stable and Medicine will sign off. Please do not hesitate to contact if new questions or concerns arise.         Marnie Jaramillo PA-C           Hospital Course:   Patient was admitted to [facility] with attending Lis Sanchez MD as a voluntary patient. The patient was placed under status 15 (15 minute checks) to ensure patient safety.     MSSA protocol was initiated due to the patient's history of alcohol abuse and concern for withdrawal symptoms.  Over the course  of hospitalization, patient was treated with Valium to manage withdrawal. Last dose of Valium was given on 1/15/22. She completed detox on 1/16/22. She required a TOTAL of 125mg of Valium since admission. She has not experienced any significant symptoms of withdrawal since that time. She experienced no complications associated with withdrawal with exception of HTN and mild transamitis. Hydroxyzine and Trazodone were utilized prn for management of anxiety and sleep, respectively. Patient was treated with multivitamin, thiamine, and folic acid daily. She was evaluated by IM (see above). She was medically cleared at time of discharge. Anti-craving medications were discussed, and naltrexone was re-initiated after R/B/A were reviewed with patient. She tolerated medications well without reported side effects.     Treatment plan includes: discharge to home with plan to pursue IOP at [facility].     Patient denied alcohol cravings at time of discharge. She understands risks associated with relapse. Appears to be motivated to maintain sobriety upon discharge.      She did participate in groups and was visible in the milieu.     The patient's symptoms of withdrawal fully resolved.     Patient was released to home with plan to engage in IOP at [facility]. At the time of discharge, patient was determined to not be a danger to himself or others. She consistently denied SI/HI, and remained future oriented.     Because this patient meets criteria for an Alcohol Use Disorder, I performed the following brief intervention on the date of this note:              1) Expressed concern that the patient is drinking at unhealthy levels known to increase their risk of alcohol related problems              2) Gave feedback linking alcohol use and health, including personalized feedback explaining how alcohol use can interact with their medical and/or psychiatric problems, and with prescribed medications.               3) Advised patient to abstain.         Discharge Medications:     Current Discharge Medication List      CONTINUE these medications which have NOT CHANGED    Details   amoxicillin (AMOXIL) 500 MG capsule TAKE 1 CAPSULE BY MOUTH TWICE DAILY FOR ACNE      carboxymethylcellulose (REFRESH LIQUIGEL) 1 % ophthalmic solution [CARBOXYMETHYLCELLULOSE (REFRESH LIQUIGEL) 1 % OPHTHALMIC SOLUTION] Apply 1 drop to eye 2 (two) times a day as needed.      cyclobenzaprine (FLEXERIL) 10 MG tablet Take 1 tablet (10 mg) by mouth 3 times daily as needed for muscle spasms  Qty: 20 tablet, Refills: 0      diazepam (VALIUM) 5 MG tablet Take 1 tablet (5 mg) by mouth every 6 hours as needed for withdrawal  Qty: 10 tablet, Refills: 0      escitalopram oxalate (LEXAPRO) 20 MG tablet [ESCITALOPRAM OXALATE (LEXAPRO) 20 MG TABLET] Take 20 mg by mouth daily.      hydrOXYzine (ATARAX) 25 MG tablet Take 1 tablet (25 mg) by mouth 3 times daily as needed for anxiety  Qty: 60 tablet, Refills: 0    Associated Diagnoses: Anxiety state      levETIRAcetam (KEPPRA) 500 MG tablet Take 1 tablet (500 mg) by mouth 2 times daily  Qty: 60 tablet, Refills: 0      loratadine (CLARITIN) 10 MG tablet Take 10 mg by mouth daily as needed for allergies      nicotine polacrilex (NICORETTE) 4 MG gum Place 4 mg inside cheek as needed       nystatin (MYCOSTATIN) 828053 UNIT/GM external cream APPLY TOPICALLY TWICE DAILY      traZODone (DESYREL) 50 MG tablet Take  mg by mouth At Bedtime       triamcinolone (ARISTOCORT HP) 0.5 % external cream APPLY TOPICALLY TWICE DAILY      folic acid (FOLVITE) 1 MG tablet Take 1 tablet (1 mg) by mouth daily  Qty: 30 tablet, Refills: 0    Associated Diagnoses: Alcohol withdrawal seizure without complication (H)      glucosamine-chondroitin 500-400 mg cap Take 1 capsule by mouth daily as needed       naltrexone (DEPADE/REVIA) 50 MG tablet Take 1 tablet (50 mg) by mouth At Bedtime Take 1/2 tab WITH FOOD EACH NIGHT FOR 3 NIGHTS, THE TAKE  1 TAB EACH NIGHT.  Qty: 30 tablet, Refills: 0    Associated Diagnoses: Alcoholism /alcohol abuse      nicotine (NICODERM CQ) 21 MG/24HR 24 hr patch Place 1 patch onto the skin daily  Qty: 28 patch, Refills: 0    Associated Diagnoses: Encounter for smoking cessation counseling                  Psychiatric Examination:   Appearance:  awake, alert and adequately groomed  Attitude:  cooperative  Eye Contact:  good  Mood:  better  Affect:  appropriate and in normal range  Speech:  clear, coherent  Psychomotor Behavior:  no evidence of tardive dyskinesia, dystonia, or tics  Thought Process:  logical, linear and goal oriented  Associations:  no loose associations  Thought Content:  no evidence of suicidal ideation or homicidal ideation and no evidence of psychotic thought  Insight:  good  Judgment:  intact  Oriented to:  time, person, and place  Attention Span and Concentration:  intact  Recent and Remote Memory:  intact  Language: Able to name objects, Able to repeat phrases and Able to read and write  Fund of Knowledge: appropriate  Muscle Strength and Tone: normal  Gait and Station: Normal         Discharge Plan:   Summary: You were admitted to [facility] on 1/13/22 for detoxification from alcohol.  A medical exam was performed that included lab work. You have met with a  and opted to pursue treatment with [facility].  Please take care and make your recovery a priority, Evelyn!     [facility]  [facility]  [address], [address]  [place] [address]     New & Current Clients  [phone]     You are scheduled to attend the medium intensity program at the [place] location with orientation on : Tuesday January 18th at 11 AM.  Your group will meet Mondays, Tuesdays, and Thursdays from 5-9 pm.  You can start group on Thursday January 20th.  You are scheduled for a one on one with a counselor on Wednesday January 26th at 9:00 AM.  All of these appointments will be in  person.     Main Diagnosis: Per Dr. Kendrick Lira MD  303.90 (F10.20) Alcohol Use Disorder Severe        Major Treatments, Procedures and Findings:  You were detoxed from alcohol with the Modified Selective Severity Protocol using Valium. You have met with a  to develop a treatment plan for discharge.  You have had labs drawn and a copy of those labs will be sent home with you.  Please bring your lab results with to your follow up doctor appointment.     Symptoms to Report:  If you experience more anxiety, confusion, sleeplessness, deep sadness or thoughts of suicide, notify your treatment team or notify your primary care physician. IF ANY OF THE SYMPTOMS YOU ARE EXPERIENCING ARE A MEDICAL EMERGENCY CALL 911 IMMEDIATELY.      Lifestyle Adjustment: Adjust your lifestyle to get enough sleep, relaxation, exercise and  good nutrition. Continue to develop healthy coping skills to decrease stress and promote a sober living environment. Do not use alcohol, illegal drugs or addictive medications other than what is currently prescribed. AA, NA, and  Sponsor are excellent resources for support.      Primary Provider:  Please schedule a follow up appointment within 30 days of discharge  [facility]  [email or web address]  [address], [address], [place] [address]   ~15.6 mi  [phone]     Disposition: Home        Facts about COVID19 at www.cdc.gov/COVID19 and www.[place].gov/covid19     Keeping hands clean is one of the most important steps we can take to avoid getting sick and spreading germs to others.  Please wash your hands frequently and lather with soap for at least 20 seconds!           Resources:      Resources for on line recovery meetings:        *due to covid-19 AA/NA meetings are being held online*        AA meetings can be found online; search for them at: http://aa-intergroup.org/directory.php  AA meetings via [place] for [place] area can be found  "online at: https://a[place].org/find-a-meeting/holiday-closings/  NA meetings via [place] for [place] area can be found online at: https://sites.google.com/view/[place]regionofnarcoticsanonymous/home?authuser=2     Www.[employer]  has online resources for meeting and recovery care including Podcast \"Let's Talk:Addiction & Recovery Podcasts     Www.[place]recovery.org      DISCHARGE RESOURCES:  -SMART Recovery - self management for addiction recovery:  www.smartrecovery.org    -Pathways ~ A Health Crisis Resource & Support Center: 745.741.2004.  -[facility] [phone]   -[facility] Behavioral Intake 815-026-5787 or 420-547-4000.  -Suicide Awareness Voices of Education (SAVE) (www.save.org): 166-539-YGKL (5216)  -National Suicide Prevention Line (www.mentalhealthmn.org): 598-779-CYIW (0281)  -National [employer] on Mental Illness (www.[place].delilah.org): 164.327.9101 or 066-290-7295.  -Ztvf9jwxe: text the word LIFE to 12817 for immediate support and crisis intervention  -Mental Health Consumer/Survivor Network of [place] (www.mhcsn.net): 993.617.2763 or 559-355-4329  -Mental Health Association of [place] (www.mentalhealth.org): 377.709.3338 or 936-904-7710     -Substance Abuse and Mental Health Services (www.samhsa.gov)  -Harm Reduction Coalition (www. Harmreduction.org)  -www.prescribetoprevent.org or http://prescribetoprevent.org/video  -Poison control 1-801.566.6221   **[place] Opioid Prevention Coalition: www.opioidcoalition.org     Sober Support Group Information:  AA/NA & Sponsor/Support  -Alcoholics Anonymous (www.alcoholics-anonymous.org): for local information 24 hours/day  -AA Intergroup service office in [place] (http://www.aastpaul.org/) 933-347-4138  -AA Intergroup service office in [facility]: [phone]. (http://www.a[place].org/)  -Narcotics Anonymous (www.nam[place].org) (319) 556-8093   **Sober Fun Activities: www.sober-activities.[employer]/cities//[facility] " Recovery Connection ([facility])  [facility] connects people seeking recovery to resources that help foster and sustain long-term recovery.  Whether you are seeking resources for treatment, transportation, housing, job training, education, health care or other pathways to recovery, [facility] is a great place to start.    Phone: 453.326.7450.  www.[place] Dailies (Great listing of all types of recovery and non-recovery related resources)        General Medication Instructions:   See your medication sheet(s) for instructions.   Take all medicines as directed.  Make no changes unless your doctor suggests them.   Go to all your doctor visits.  Be sure to have all your required lab tests. This way, your medicines can be refilled on time.  Do not use any drugs not prescribed by your provider.  AA/NA and Sponsors are excellent resources for support  Avoid alcohol.     Any follow up concerns:  Nursing questions call the [address] [phone]  Medical Record call [phone]  Outpatient Behavioral Intake call [phone]  LP+ Wait List/Bed Availability call [phone]     The entire treatment team has appreciated the opportunity to work with you Evelyn.  We wish you the best in the future and with your lifelong recovery goals. Please bring this discharge folder with you to all follow up appointments.  It contains your lab results, diagnosis, medication list and discharge recommendations.     THANK YOU FOR CHOOSING THE [facility]    Attestation:  The patient has been seen and evaluated by me,  Lis Sanchez MD     > 35 minutes total time that was spent and over 50% of this time was spent in counseling and coordination of care.

## 2022-01-17 NOTE — PROGRESS NOTES
01/17/22 0609   Sleep/Rest/Relaxation   Sleep/Rest/Relaxation appears asleep   Night Time # Hours 6.75 hours     Patient appeared to be sleeping throughout the night. No concerns voiced.

## 2022-01-17 NOTE — DISCHARGE INSTRUCTIONS
Behavioral Discharge Planning and Instructions  THANK YOU FOR CHOOSING THE [facility]  3A  [phone]    Summary: You were admitted to Station 3A on 1/13/22 for detoxification from alcohol.  A medical exam was performed that included lab work. You have met with a  and opted to pursue treatment with [facility].  Please take care and make your recovery a priority, Evelyn!    Sridevi  [id number], [address]  [place] [address]    New & Current Clients  [phone]    You are scheduled to attend the medium intensity program at the [place] location with orientation on : Tuesday January 18th at 11 AM.  Your group will meet Mondays, Tuesdays, and Thursdays from 5-9 pm.  You can start group on Thursday January 20th.  You are scheduled for a one on one with a counselor on Wednesday January 26th at 9:00 AM.  All of these appointments will be in person.    Main Diagnosis: Per Dr. Kendrick Lira MD  303.90 (F10.20) Alcohol Use Disorder Severe      Major Treatments, Procedures and Findings:  You were detoxed from alcohol with the Modified Selective Severity Protocol using Valium. You have met with a  to develop a treatment plan for discharge.  You have had labs drawn and a copy of those labs will be sent home with you.  Please bring your lab results with to your follow up doctor appointment.    Symptoms to Report:  If you experience more anxiety, confusion, sleeplessness, deep sadness or thoughts of suicide, notify your treatment team or notify your primary care physician. IF ANY OF THE SYMPTOMS YOU ARE EXPERIENCING ARE A MEDICAL EMERGENCY CALL 911 IMMEDIATELY.     Lifestyle Adjustment: Adjust your lifestyle to get enough sleep, relaxation, exercise and  good nutrition. Continue to develop healthy coping skills to decrease stress and promote a sober living environment. Do not use alcohol, illegal drugs or addictive  "medications other than what is currently prescribed. AA, NA, and  Sponsor are excellent resources for support.     Primary Provider:  Please schedule a follow up appointment within 30 days of discharge  [facility]  [email or web address]  [address], [address], [place] [address]   ~15.6 mi  [phone]    Disposition: Home      Facts about COVID19 at www.cdc.gov/COVID19 and www.[place].gov/covid19    Keeping hands clean is one of the most important steps we can take to avoid getting sick and spreading germs to others.  Please wash your hands frequently and lather with soap for at least 20 seconds!        Resources:     Resources for on line recovery meetings:      *due to covid-19 AA/NA meetings are being held online*      AA meetings can be found online; search for them at: http://aa-intergroup.org/directory.php  AA meetings via [place] for [place] area can be found online at: https://a[place].org/find-a-meeting/holiday-closings/  NA meetings via [place] for [place] area can be found online at: https://sites.NaturalPath Media.com/view/mnregionofnarcoticsanonymous/home?authuser=2    Www.Gevo  has online resources for meeting and recovery care including Podcast \"Let's Talk:Addiction & Recovery Podcasts    Www.mnrecSGX Pharmaceuticals.org     DISCHARGE RESOURCES:  -SMART Recovery - self management for addiction recovery:  www.smartrecHS Pharmaceuticalsy.org    -Pathways ~ A Health Crisis Resource & Support Center: 392.331.9189.  -[facility] [phone]   -[facility] 103-201-2710 or 777-593-2431.  -Suicide Awareness Voices of Education (SAVE) (www.save.org): 710-975-YHES (8401)  -National Suicide Prevention Line (www.mentalhealthmn.org): 398-325-QXEO (5561)  -National [place] on Mental Illness (www.[place].delilah.org): 196.432.1429 or 264-231-6804.  -Qjzi9vfwc: text the word LIFE to 63422 for immediate support and crisis intervention  -Mental Health " Consumer/Survivor Network of [place] (www.mhcsn.net): 912.277.9510 or 195-879-1309  -Mental Health Association of [place] (www.mentalhealth.org): 316.551.9575 or 275-072-0435     -Substance Abuse and Mental Health Services (www.samhsa.gov)  -Harm Reduction Coalition (www. Harmreduction.org)  -www.prescribetoprevent.org or http://prescribetoprevent.org/video  -Poison control 2-418-907-2298   **[place] Opioid Prevention Coalition: www.opioidcoalition.org    Sober Support Group Information:  AA/NA & Sponsor/Support  -Alcoholics Anonymous (www.alcoholics-anonymous.org): for local information 24 hours/day  -AA Intergroup service office in [place] (http://www.aastpaul.org/) 848.482.3099  -AA Intergroup service office in [facility]: 325.141.9305. (http://www.a[place].org/)  -Narcotics Anonymous (www.naminn[place].org) (196) 273-7136   **Sober Fun Activities: www.sobermeXBT / Crypto Exchange of the Americasactivities.RAZ Mobile/[facility]//[facility] Recovery Connection ([facility])  [facility] connects people seeking recovery to resources that help foster and sustain long-term recovery.  Whether you are seeking resources for treatment, transportation, housing, job training, education, health care or other pathways to recovery, [facility] is a great place to start.    Phone: 781.313.8878.  www.[place]RealRider.Avedro (Great listing of all types of recovery and non-recovery related resources)      General Medication Instructions:   See your medication sheet(s) for instructions.   Take all medicines as directed.  Make no changes unless your doctor suggests them.   Go to all your doctor visits.  Be sure to have all your required lab tests. This way, your medicines can be refilled on time.  Do not use any drugs not prescribed by your provider.  AA/NA and Sponsors are excellent resources for support  Avoid alcohol.    Any follow up concerns:  Nursing questions call the Unit -[facility] [phone]  Medical Record call [phone]  Outpatient Behavioral Intake call  [phone]  LP+ Wait List/Bed Availability call [phone]    The entire treatment team has appreciated the opportunity to work with you Evelyn.  We wish you the best in the future and with your lifelong recovery goals. Please bring this discharge folder with you to all follow up appointments.  It contains your lab results, diagnosis, medication list and discharge recommendations.    THANK YOU FOR CHOOSING THE [facility]

## 2022-01-17 NOTE — PROGRESS NOTES
Patient discharged to home. Pt transported by medical cab.  Patient escorted off the unit by psych associateTara.        All patient belongings from room, locked bin and security were sent with patient. This RN went over discharge instructions, teachings, patient's labs, and discharge  recommendations with patient. This RN went over patient's prescribed medications being sent home with patient from pharmacy. Patient verbalizes and demonstrates understanding of all teachings. Patient denies thoughts of harm towards self or others.  Pt denies any current medical issues at this time. Patient denies any further questions and is now discharged at this time

## 2022-01-18 ENCOUNTER — PATIENT OUTREACH (OUTPATIENT)
Dept: CARE COORDINATION | Facility: CLINIC | Age: 52
End: 2022-01-18
Payer: COMMERCIAL

## 2022-01-18 DIAGNOSIS — Z71.89 OTHER SPECIFIED COUNSELING: ICD-10-CM

## 2022-01-18 NOTE — PROGRESS NOTES
Clinic Care Coordination Contact  Care Team Conversations    Patient identified for care management outreach, however patient is not on a value based contract so cannot complete outreach. Will escalate to clinic staff if specific needs or resources are indicated.    RENA Roldan  Social Work Care Coordinator - [facility]  Care Coordination  [email or web address]  Cell Phone: [phone]  Gender pronouns: she/her  Employed by [facility]

## 2022-01-20 ENCOUNTER — LAB REQUISITION (OUTPATIENT)
Dept: LAB | Facility: CLINIC | Age: 52
End: 2022-01-20

## 2022-01-20 DIAGNOSIS — I10 ESSENTIAL (PRIMARY) HYPERTENSION: ICD-10-CM

## 2022-01-20 LAB
ALBUMIN SERPL-MCNC: 4 G/DL (ref 3.5–5)
ALP SERPL-CCNC: 145 U/L (ref 45–120)
ALT SERPL W P-5'-P-CCNC: 25 U/L (ref 0–45)
ANION GAP SERPL CALCULATED.3IONS-SCNC: 15 MMOL/L (ref 5–18)
AST SERPL W P-5'-P-CCNC: 32 U/L (ref 0–40)
BILIRUB SERPL-MCNC: 0.2 MG/DL (ref 0–1)
BUN SERPL-MCNC: 5 MG/DL (ref 8–22)
CALCIUM SERPL-MCNC: 9.8 MG/DL (ref 8.5–10.5)
CHLORIDE BLD-SCNC: 95 MMOL/L (ref 98–107)
CHOLEST SERPL-MCNC: 211 MG/DL
CO2 SERPL-SCNC: 23 MMOL/L (ref 22–31)
CREAT SERPL-MCNC: 0.64 MG/DL (ref 0.6–1.1)
GFR SERPL CREATININE-BSD FRML MDRD: >90 ML/MIN/1.73M2
GLUCOSE BLD-MCNC: 80 MG/DL (ref 70–125)
HDLC SERPL-MCNC: 72 MG/DL
LDLC SERPL CALC-MCNC: 105 MG/DL
POTASSIUM BLD-SCNC: 4.1 MMOL/L (ref 3.5–5)
PROT SERPL-MCNC: 7.2 G/DL (ref 6–8)
SODIUM SERPL-SCNC: 133 MMOL/L (ref 136–145)
TRIGL SERPL-MCNC: 170 MG/DL

## 2022-01-20 PROCEDURE — 80053 COMPREHEN METABOLIC PANEL: CPT | Performed by: NURSE PRACTITIONER

## 2022-01-20 PROCEDURE — 80061 LIPID PANEL: CPT | Performed by: NURSE PRACTITIONER

## 2022-01-20 PROCEDURE — 82040 ASSAY OF SERUM ALBUMIN: CPT | Performed by: NURSE PRACTITIONER

## 2022-01-27 RX ORDER — UBIDECARENONE 75 MG
100 CAPSULE ORAL DAILY
COMMUNITY
End: 2022-01-28

## 2022-01-27 RX ORDER — MIRTAZAPINE 30 MG/1
30 TABLET, FILM COATED ORAL AT BEDTIME
COMMUNITY

## 2022-01-27 RX ORDER — AMOXICILLIN 500 MG
1200 CAPSULE ORAL DAILY
COMMUNITY

## 2022-01-27 RX ORDER — PROPRANOLOL HYDROCHLORIDE 80 MG/1
80 CAPSULE, EXTENDED RELEASE ORAL DAILY
COMMUNITY

## 2022-01-27 RX ORDER — ANTIARTHRITIC COMBINATION NO.2 900 MG
5000 TABLET ORAL DAILY
COMMUNITY

## 2022-01-27 RX ORDER — MULTIVITAMIN WITH IRON
100 TABLET ORAL DAILY
COMMUNITY

## 2022-01-27 RX ORDER — LACTOBACILLUS RHAMNOSUS GG 10B CELL
1 CAPSULE ORAL 2 TIMES DAILY
COMMUNITY
End: 2022-01-28

## 2022-01-27 RX ORDER — LORATADINE 10 MG
1 TABLET ORAL DAILY PRN
COMMUNITY

## 2022-01-27 RX ORDER — MULTIVITAMIN
1 TABLET ORAL DAILY
COMMUNITY

## 2022-01-27 RX ORDER — VITAMIN E 268 MG
400 CAPSULE ORAL DAILY
COMMUNITY

## 2022-01-27 ASSESSMENT — MIFFLIN-ST. JEOR: SCORE: 1267.25

## 2022-01-28 ENCOUNTER — LAB (OUTPATIENT)
Dept: LAB | Facility: CLINIC | Age: 52
End: 2022-01-28
Attending: COLON & RECTAL SURGERY
Payer: COMMERCIAL

## 2022-01-28 DIAGNOSIS — Z11.59 ENCOUNTER FOR SCREENING FOR OTHER VIRAL DISEASES: ICD-10-CM

## 2022-01-28 PROCEDURE — U0003 INFECTIOUS AGENT DETECTION BY NUCLEIC ACID (DNA OR RNA); SEVERE ACUTE RESPIRATORY SYNDROME CORONAVIRUS 2 (SARS-COV-2) (CORONAVIRUS DISEASE [COVID-19]), AMPLIFIED PROBE TECHNIQUE, MAKING USE OF HIGH THROUGHPUT TECHNOLOGIES AS DESCRIBED BY CMS-2020-01-R: HCPCS

## 2022-01-28 PROCEDURE — U0005 INFEC AGEN DETEC AMPLI PROBE: HCPCS

## 2022-01-28 RX ORDER — LACTOBACILLUS RHAMNOSUS GG 10B CELL
1 CAPSULE ORAL EVERY EVENING
COMMUNITY

## 2022-01-28 RX ORDER — FLUTICASONE PROPIONATE 50 MCG
1 SPRAY, SUSPENSION (ML) NASAL DAILY PRN
COMMUNITY

## 2022-01-28 RX ORDER — MULTIVIT WITH MINERALS/LUTEIN
1000 TABLET ORAL DAILY
COMMUNITY

## 2022-01-29 ENCOUNTER — ANESTHESIA EVENT (OUTPATIENT)
Dept: SURGERY | Facility: HOSPITAL | Age: 52
DRG: 330 | End: 2022-01-29
Payer: COMMERCIAL

## 2022-01-29 LAB — SARS-COV-2 RNA RESP QL NAA+PROBE: NEGATIVE

## 2022-01-30 ASSESSMENT — LIFESTYLE VARIABLES: TOBACCO_USE: 1

## 2022-01-30 ASSESSMENT — ENCOUNTER SYMPTOMS: SEIZURES: 1

## 2022-01-31 ENCOUNTER — HOSPITAL ENCOUNTER (INPATIENT)
Facility: HOSPITAL | Age: 52
LOS: 3 days | Discharge: HOME | DRG: 330 | End: 2022-02-03
Attending: COLON & RECTAL SURGERY | Admitting: COLON & RECTAL SURGERY
Payer: COMMERCIAL

## 2022-01-31 ENCOUNTER — ANESTHESIA (OUTPATIENT)
Dept: SURGERY | Facility: HOSPITAL | Age: 52
DRG: 330 | End: 2022-01-31
Payer: COMMERCIAL

## 2022-01-31 DIAGNOSIS — G89.18 ACUTE POST-OPERATIVE PAIN: Primary | ICD-10-CM

## 2022-01-31 DIAGNOSIS — Z20.822 CONTACT WITH AND (SUSPECTED) EXPOSURE TO COVID-19: ICD-10-CM

## 2022-01-31 DIAGNOSIS — Z90.49 S/P RIGHT COLECTOMY: ICD-10-CM

## 2022-01-31 PROBLEM — I10 HTN (HYPERTENSION): Status: ACTIVE | Noted: 2021-09-01

## 2022-01-31 PROBLEM — F10.10 CHRONIC ALCOHOL ABUSE: Status: ACTIVE | Noted: 2022-01-31

## 2022-01-31 LAB
ABO/RH(D): NORMAL
ANTIBODY SCREEN: NEGATIVE
CREAT SERPL-MCNC: 0.75 MG/DL (ref 0.6–1.1)
GFR SERPL CREATININE-BSD FRML MDRD: >90 ML/MIN/1.73M2
HGB BLD-MCNC: 15.5 G/DL (ref 11.7–15.7)
HOLD SPECIMEN: NORMAL
MAGNESIUM SERPL-MCNC: 2 MG/DL (ref 1.8–2.6)
PHOSPHATE SERPL-MCNC: 4 MG/DL (ref 2.5–4.5)
PLATELET # BLD AUTO: 242 10E3/UL (ref 150–450)
POTASSIUM BLD-SCNC: 3.8 MMOL/L (ref 3.5–5)
SPECIMEN EXPIRATION DATE: NORMAL

## 2022-01-31 PROCEDURE — 370N000017 HC ANESTHESIA TECHNICAL FEE, PER MIN: Performed by: COLON & RECTAL SURGERY

## 2022-01-31 PROCEDURE — 258N000003 HC RX IP 258 OP 636: Performed by: STUDENT IN AN ORGANIZED HEALTH CARE EDUCATION/TRAINING PROGRAM

## 2022-01-31 PROCEDURE — 84132 ASSAY OF SERUM POTASSIUM: CPT | Performed by: FAMILY MEDICINE

## 2022-01-31 PROCEDURE — HZ2ZZZZ DETOXIFICATION SERVICES FOR SUBSTANCE ABUSE TREATMENT: ICD-10-PCS | Performed by: COLON & RECTAL SURGERY

## 2022-01-31 PROCEDURE — 250N000011 HC RX IP 250 OP 636: Performed by: COLON & RECTAL SURGERY

## 2022-01-31 PROCEDURE — 250N000013 HC RX MED GY IP 250 OP 250 PS 637: Performed by: FAMILY MEDICINE

## 2022-01-31 PROCEDURE — 250N000011 HC RX IP 250 OP 636: Performed by: NURSE ANESTHETIST, CERTIFIED REGISTERED

## 2022-01-31 PROCEDURE — 86901 BLOOD TYPING SEROLOGIC RH(D): CPT | Performed by: STUDENT IN AN ORGANIZED HEALTH CARE EDUCATION/TRAINING PROGRAM

## 2022-01-31 PROCEDURE — C9290 INJ, BUPIVACAINE LIPOSOME: HCPCS | Performed by: ANESTHESIOLOGY

## 2022-01-31 PROCEDURE — 250N000013 HC RX MED GY IP 250 OP 250 PS 637: Performed by: ANESTHESIOLOGY

## 2022-01-31 PROCEDURE — 82565 ASSAY OF CREATININE: CPT | Performed by: FAMILY MEDICINE

## 2022-01-31 PROCEDURE — 0DBP8ZZ EXCISION OF RECTUM, VIA NATURAL OR ARTIFICIAL OPENING ENDOSCOPIC: ICD-10-PCS | Performed by: COLON & RECTAL SURGERY

## 2022-01-31 PROCEDURE — 250N000011 HC RX IP 250 OP 636: Performed by: STUDENT IN AN ORGANIZED HEALTH CARE EDUCATION/TRAINING PROGRAM

## 2022-01-31 PROCEDURE — 258N000003 HC RX IP 258 OP 636: Performed by: NURSE ANESTHETIST, CERTIFIED REGISTERED

## 2022-01-31 PROCEDURE — 85018 HEMOGLOBIN: CPT | Performed by: STUDENT IN AN ORGANIZED HEALTH CARE EDUCATION/TRAINING PROGRAM

## 2022-01-31 PROCEDURE — 250N000013 HC RX MED GY IP 250 OP 250 PS 637: Performed by: STUDENT IN AN ORGANIZED HEALTH CARE EDUCATION/TRAINING PROGRAM

## 2022-01-31 PROCEDURE — 250N000009 HC RX 250: Performed by: STUDENT IN AN ORGANIZED HEALTH CARE EDUCATION/TRAINING PROGRAM

## 2022-01-31 PROCEDURE — 250N000011 HC RX IP 250 OP 636: Performed by: ANESTHESIOLOGY

## 2022-01-31 PROCEDURE — 0DBF4ZZ EXCISION OF RIGHT LARGE INTESTINE, PERCUTANEOUS ENDOSCOPIC APPROACH: ICD-10-PCS | Performed by: COLON & RECTAL SURGERY

## 2022-01-31 PROCEDURE — 999N000141 HC STATISTIC PRE-PROCEDURE NURSING ASSESSMENT: Performed by: COLON & RECTAL SURGERY

## 2022-01-31 PROCEDURE — 120N000001 HC R&B MED SURG/OB

## 2022-01-31 PROCEDURE — 83735 ASSAY OF MAGNESIUM: CPT | Performed by: FAMILY MEDICINE

## 2022-01-31 PROCEDURE — 258N000003 HC RX IP 258 OP 636: Performed by: ANESTHESIOLOGY

## 2022-01-31 PROCEDURE — 8E0W4CZ ROBOTIC ASSISTED PROCEDURE OF TRUNK REGION, PERCUTANEOUS ENDOSCOPIC APPROACH: ICD-10-PCS | Performed by: COLON & RECTAL SURGERY

## 2022-01-31 PROCEDURE — 710N000009 HC RECOVERY PHASE 1, LEVEL 1, PER MIN: Performed by: COLON & RECTAL SURGERY

## 2022-01-31 PROCEDURE — 85049 AUTOMATED PLATELET COUNT: CPT | Performed by: STUDENT IN AN ORGANIZED HEALTH CARE EDUCATION/TRAINING PROGRAM

## 2022-01-31 PROCEDURE — 36415 COLL VENOUS BLD VENIPUNCTURE: CPT | Performed by: STUDENT IN AN ORGANIZED HEALTH CARE EDUCATION/TRAINING PROGRAM

## 2022-01-31 PROCEDURE — 84100 ASSAY OF PHOSPHORUS: CPT | Performed by: FAMILY MEDICINE

## 2022-01-31 PROCEDURE — 272N000001 HC OR GENERAL SUPPLY STERILE: Performed by: COLON & RECTAL SURGERY

## 2022-01-31 PROCEDURE — 250N000025 HC SEVOFLURANE, PER MIN: Performed by: COLON & RECTAL SURGERY

## 2022-01-31 PROCEDURE — P9041 ALBUMIN (HUMAN),5%, 50ML: HCPCS | Performed by: NURSE ANESTHETIST, CERTIFIED REGISTERED

## 2022-01-31 PROCEDURE — 250N000009 HC RX 250: Performed by: ANESTHESIOLOGY

## 2022-01-31 PROCEDURE — 250N000009 HC RX 250: Performed by: NURSE ANESTHETIST, CERTIFIED REGISTERED

## 2022-01-31 PROCEDURE — 250N000009 HC RX 250: Performed by: COLON & RECTAL SURGERY

## 2022-01-31 PROCEDURE — 88305 TISSUE EXAM BY PATHOLOGIST: CPT | Mod: TC | Performed by: COLON & RECTAL SURGERY

## 2022-01-31 PROCEDURE — 99232 SBSQ HOSP IP/OBS MODERATE 35: CPT | Performed by: FAMILY MEDICINE

## 2022-01-31 PROCEDURE — 36415 COLL VENOUS BLD VENIPUNCTURE: CPT | Performed by: FAMILY MEDICINE

## 2022-01-31 PROCEDURE — 360N000080 HC SURGERY LEVEL 7, PER MIN: Performed by: COLON & RECTAL SURGERY

## 2022-01-31 RX ORDER — SODIUM CHLORIDE, SODIUM LACTATE, POTASSIUM CHLORIDE, CALCIUM CHLORIDE 600; 310; 30; 20 MG/100ML; MG/100ML; MG/100ML; MG/100ML
INJECTION, SOLUTION INTRAVENOUS CONTINUOUS
Status: DISCONTINUED | OUTPATIENT
Start: 2022-01-31 | End: 2022-01-31 | Stop reason: HOSPADM

## 2022-01-31 RX ORDER — HYDROMORPHONE HCL IN WATER/PF 6 MG/30 ML
0.4 PATIENT CONTROLLED ANALGESIA SYRINGE INTRAVENOUS
Status: DISCONTINUED | OUTPATIENT
Start: 2022-01-31 | End: 2022-02-03 | Stop reason: HOSPADM

## 2022-01-31 RX ORDER — ONDANSETRON 2 MG/ML
4 INJECTION INTRAMUSCULAR; INTRAVENOUS EVERY 6 HOURS PRN
Status: DISCONTINUED | OUTPATIENT
Start: 2022-01-31 | End: 2022-02-03 | Stop reason: HOSPADM

## 2022-01-31 RX ORDER — METOPROLOL TARTRATE 1 MG/ML
5 INJECTION, SOLUTION INTRAVENOUS EVERY 6 HOURS
Status: DISCONTINUED | OUTPATIENT
Start: 2022-02-01 | End: 2022-01-31

## 2022-01-31 RX ORDER — DEXAMETHASONE SODIUM PHOSPHATE 10 MG/ML
INJECTION, SOLUTION INTRAMUSCULAR; INTRAVENOUS PRN
Status: DISCONTINUED | OUTPATIENT
Start: 2022-01-31 | End: 2022-01-31

## 2022-01-31 RX ORDER — SODIUM CHLORIDE, SODIUM LACTATE, POTASSIUM CHLORIDE, CALCIUM CHLORIDE 600; 310; 30; 20 MG/100ML; MG/100ML; MG/100ML; MG/100ML
INJECTION, SOLUTION INTRAVENOUS CONTINUOUS
Status: DISCONTINUED | OUTPATIENT
Start: 2022-01-31 | End: 2022-02-02

## 2022-01-31 RX ORDER — HEPARIN SODIUM 5000 [USP'U]/.5ML
5000 INJECTION, SOLUTION INTRAVENOUS; SUBCUTANEOUS
Status: COMPLETED | OUTPATIENT
Start: 2022-01-31 | End: 2022-01-31

## 2022-01-31 RX ORDER — NALOXONE HYDROCHLORIDE 0.4 MG/ML
0.2 INJECTION, SOLUTION INTRAMUSCULAR; INTRAVENOUS; SUBCUTANEOUS
Status: DISCONTINUED | OUTPATIENT
Start: 2022-01-31 | End: 2022-02-03 | Stop reason: HOSPADM

## 2022-01-31 RX ORDER — LIDOCAINE 40 MG/G
CREAM TOPICAL
Status: DISCONTINUED | OUTPATIENT
Start: 2022-01-31 | End: 2022-01-31 | Stop reason: HOSPADM

## 2022-01-31 RX ORDER — OXYCODONE HYDROCHLORIDE 5 MG/1
5 TABLET ORAL EVERY 4 HOURS PRN
Status: DISCONTINUED | OUTPATIENT
Start: 2022-01-31 | End: 2022-02-03 | Stop reason: HOSPADM

## 2022-01-31 RX ORDER — LIDOCAINE 40 MG/G
CREAM TOPICAL
Status: DISCONTINUED | OUTPATIENT
Start: 2022-01-31 | End: 2022-02-03 | Stop reason: HOSPADM

## 2022-01-31 RX ORDER — ACETAMINOPHEN 325 MG/1
975 TABLET ORAL EVERY 8 HOURS
Status: COMPLETED | OUTPATIENT
Start: 2022-01-31 | End: 2022-02-03

## 2022-01-31 RX ORDER — HYDROMORPHONE HCL IN WATER/PF 6 MG/30 ML
0.2 PATIENT CONTROLLED ANALGESIA SYRINGE INTRAVENOUS
Status: DISCONTINUED | OUTPATIENT
Start: 2022-01-31 | End: 2022-02-03 | Stop reason: HOSPADM

## 2022-01-31 RX ORDER — LEVETIRACETAM 100 MG/ML
500 SOLUTION ORAL 2 TIMES DAILY
Status: DISCONTINUED | OUTPATIENT
Start: 2022-01-31 | End: 2022-01-31

## 2022-01-31 RX ORDER — CYCLOBENZAPRINE HCL 10 MG
10 TABLET ORAL 3 TIMES DAILY PRN
Status: DISCONTINUED | OUTPATIENT
Start: 2022-01-31 | End: 2022-02-03 | Stop reason: HOSPADM

## 2022-01-31 RX ORDER — ALBUMIN, HUMAN INJ 5% 5 %
SOLUTION INTRAVENOUS CONTINUOUS PRN
Status: DISCONTINUED | OUTPATIENT
Start: 2022-01-31 | End: 2022-01-31

## 2022-01-31 RX ORDER — ONDANSETRON 4 MG/1
4 TABLET, ORALLY DISINTEGRATING ORAL EVERY 30 MIN PRN
Status: DISCONTINUED | OUTPATIENT
Start: 2022-01-31 | End: 2022-01-31 | Stop reason: HOSPADM

## 2022-01-31 RX ORDER — ACETAMINOPHEN 325 MG/1
650 TABLET ORAL EVERY 4 HOURS PRN
Status: DISCONTINUED | OUTPATIENT
Start: 2022-02-03 | End: 2022-02-03 | Stop reason: HOSPADM

## 2022-01-31 RX ORDER — PROPOFOL 10 MG/ML
INJECTION, EMULSION INTRAVENOUS CONTINUOUS PRN
Status: DISCONTINUED | OUTPATIENT
Start: 2022-01-31 | End: 2022-01-31

## 2022-01-31 RX ORDER — MIRTAZAPINE 30 MG/1
30 TABLET, FILM COATED ORAL AT BEDTIME
Status: DISCONTINUED | OUTPATIENT
Start: 2022-01-31 | End: 2022-02-03 | Stop reason: HOSPADM

## 2022-01-31 RX ORDER — MULTIPLE VITAMINS W/ MINERALS TAB 9MG-400MCG
1 TAB ORAL DAILY
Status: DISCONTINUED | OUTPATIENT
Start: 2022-02-01 | End: 2022-02-03 | Stop reason: HOSPADM

## 2022-01-31 RX ORDER — LEVETIRACETAM 500 MG/1
500 TABLET ORAL 2 TIMES DAILY
Status: DISCONTINUED | OUTPATIENT
Start: 2022-01-31 | End: 2022-02-03 | Stop reason: HOSPADM

## 2022-01-31 RX ORDER — HALOPERIDOL 5 MG/ML
2.5-5 INJECTION INTRAMUSCULAR EVERY 6 HOURS PRN
Status: DISCONTINUED | OUTPATIENT
Start: 2022-01-31 | End: 2022-02-02

## 2022-01-31 RX ORDER — FENTANYL CITRATE 50 UG/ML
INJECTION, SOLUTION INTRAMUSCULAR; INTRAVENOUS PRN
Status: DISCONTINUED | OUTPATIENT
Start: 2022-01-31 | End: 2022-01-31

## 2022-01-31 RX ORDER — DIAZEPAM 5 MG
10 TABLET ORAL EVERY 30 MIN PRN
Status: DISCONTINUED | OUTPATIENT
Start: 2022-01-31 | End: 2022-02-02

## 2022-01-31 RX ORDER — LIDOCAINE HYDROCHLORIDE 20 MG/ML
INJECTION, SOLUTION INFILTRATION; PERINEURAL PRN
Status: DISCONTINUED | OUTPATIENT
Start: 2022-01-31 | End: 2022-01-31

## 2022-01-31 RX ORDER — BUPIVACAINE HYDROCHLORIDE 2.5 MG/ML
INJECTION, SOLUTION EPIDURAL; INFILTRATION; INTRACAUDAL PRN
Status: DISCONTINUED | OUTPATIENT
Start: 2022-01-31 | End: 2022-01-31 | Stop reason: HOSPADM

## 2022-01-31 RX ORDER — MAGNESIUM HYDROXIDE 1200 MG/15ML
LIQUID ORAL PRN
Status: DISCONTINUED | OUTPATIENT
Start: 2022-01-31 | End: 2022-01-31 | Stop reason: HOSPADM

## 2022-01-31 RX ORDER — NALOXONE HYDROCHLORIDE 0.4 MG/ML
0.4 INJECTION, SOLUTION INTRAMUSCULAR; INTRAVENOUS; SUBCUTANEOUS
Status: DISCONTINUED | OUTPATIENT
Start: 2022-01-31 | End: 2022-02-03 | Stop reason: HOSPADM

## 2022-01-31 RX ORDER — ONDANSETRON 2 MG/ML
4 INJECTION INTRAMUSCULAR; INTRAVENOUS ONCE
Status: COMPLETED | OUTPATIENT
Start: 2022-01-31 | End: 2022-01-31

## 2022-01-31 RX ORDER — BUPIVACAINE HYDROCHLORIDE 2.5 MG/ML
INJECTION, SOLUTION EPIDURAL; INFILTRATION; INTRACAUDAL
Status: COMPLETED | OUTPATIENT
Start: 2022-01-31 | End: 2022-01-31

## 2022-01-31 RX ORDER — HYDROXYZINE HYDROCHLORIDE 25 MG/1
25 TABLET, FILM COATED ORAL 3 TIMES DAILY PRN
Status: DISCONTINUED | OUTPATIENT
Start: 2022-01-31 | End: 2022-02-03 | Stop reason: HOSPADM

## 2022-01-31 RX ORDER — HEPARIN SODIUM 5000 [USP'U]/.5ML
5000 INJECTION, SOLUTION INTRAVENOUS; SUBCUTANEOUS EVERY 8 HOURS
Status: DISCONTINUED | OUTPATIENT
Start: 2022-02-01 | End: 2022-02-03 | Stop reason: HOSPADM

## 2022-01-31 RX ORDER — ESCITALOPRAM OXALATE 20 MG/1
20 TABLET ORAL DAILY
Status: DISCONTINUED | OUTPATIENT
Start: 2022-02-01 | End: 2022-02-03 | Stop reason: HOSPADM

## 2022-01-31 RX ORDER — OLANZAPINE 5 MG/1
5-10 TABLET, ORALLY DISINTEGRATING ORAL EVERY 6 HOURS PRN
Status: DISCONTINUED | OUTPATIENT
Start: 2022-01-31 | End: 2022-02-02

## 2022-01-31 RX ORDER — OXYCODONE HYDROCHLORIDE 5 MG/1
5 TABLET ORAL EVERY 4 HOURS PRN
Status: DISCONTINUED | OUTPATIENT
Start: 2022-01-31 | End: 2022-01-31 | Stop reason: HOSPADM

## 2022-01-31 RX ORDER — CARBOXYMETHYLCELLULOSE SODIUM 10 MG/ML
1 GEL OPHTHALMIC 2 TIMES DAILY PRN
Status: DISCONTINUED | OUTPATIENT
Start: 2022-01-31 | End: 2022-01-31 | Stop reason: CLARIF

## 2022-01-31 RX ORDER — HALOPERIDOL 5 MG/ML
1 INJECTION INTRAMUSCULAR
Status: DISCONTINUED | OUTPATIENT
Start: 2022-01-31 | End: 2022-01-31 | Stop reason: HOSPADM

## 2022-01-31 RX ORDER — MAGNESIUM SULFATE 4 G/50ML
4 INJECTION INTRAVENOUS ONCE
Status: COMPLETED | OUTPATIENT
Start: 2022-01-31 | End: 2022-01-31

## 2022-01-31 RX ORDER — FLUTICASONE PROPIONATE 50 MCG
1 SPRAY, SUSPENSION (ML) NASAL DAILY PRN
Status: DISCONTINUED | OUTPATIENT
Start: 2022-01-31 | End: 2022-02-03 | Stop reason: HOSPADM

## 2022-01-31 RX ORDER — ACETAMINOPHEN 325 MG/1
975 TABLET ORAL ONCE
Status: COMPLETED | OUTPATIENT
Start: 2022-01-31 | End: 2022-01-31

## 2022-01-31 RX ORDER — CEFAZOLIN SODIUM 2 G/100ML
2 INJECTION, SOLUTION INTRAVENOUS SEE ADMIN INSTRUCTIONS
Status: DISCONTINUED | OUTPATIENT
Start: 2022-01-31 | End: 2022-01-31 | Stop reason: HOSPADM

## 2022-01-31 RX ORDER — GLYCOPYRROLATE 0.2 MG/ML
INJECTION, SOLUTION INTRAMUSCULAR; INTRAVENOUS PRN
Status: DISCONTINUED | OUTPATIENT
Start: 2022-01-31 | End: 2022-01-31

## 2022-01-31 RX ORDER — OXYCODONE HYDROCHLORIDE 5 MG/1
10 TABLET ORAL EVERY 4 HOURS PRN
Status: DISCONTINUED | OUTPATIENT
Start: 2022-01-31 | End: 2022-02-03 | Stop reason: HOSPADM

## 2022-01-31 RX ORDER — DEXMEDETOMIDINE HYDROCHLORIDE 4 UG/ML
.2-1 INJECTION, SOLUTION INTRAVENOUS CONTINUOUS
Status: DISCONTINUED | OUTPATIENT
Start: 2022-01-31 | End: 2022-01-31 | Stop reason: HOSPADM

## 2022-01-31 RX ORDER — PROPRANOLOL HYDROCHLORIDE 80 MG/1
80 CAPSULE, EXTENDED RELEASE ORAL DAILY
Status: DISCONTINUED | OUTPATIENT
Start: 2022-02-01 | End: 2022-02-03 | Stop reason: HOSPADM

## 2022-01-31 RX ORDER — PROPOFOL 10 MG/ML
INJECTION, EMULSION INTRAVENOUS PRN
Status: DISCONTINUED | OUTPATIENT
Start: 2022-01-31 | End: 2022-01-31

## 2022-01-31 RX ORDER — ONDANSETRON 2 MG/ML
INJECTION INTRAMUSCULAR; INTRAVENOUS PRN
Status: DISCONTINUED | OUTPATIENT
Start: 2022-01-31 | End: 2022-01-31

## 2022-01-31 RX ORDER — ONDANSETRON 2 MG/ML
4 INJECTION INTRAMUSCULAR; INTRAVENOUS EVERY 30 MIN PRN
Status: DISCONTINUED | OUTPATIENT
Start: 2022-01-31 | End: 2022-01-31 | Stop reason: HOSPADM

## 2022-01-31 RX ORDER — TRAZODONE HYDROCHLORIDE 50 MG/1
50-100 TABLET, FILM COATED ORAL AT BEDTIME
Status: DISCONTINUED | OUTPATIENT
Start: 2022-01-31 | End: 2022-02-03 | Stop reason: HOSPADM

## 2022-01-31 RX ORDER — FENTANYL CITRATE 50 UG/ML
50 INJECTION, SOLUTION INTRAMUSCULAR; INTRAVENOUS EVERY 5 MIN PRN
Status: DISCONTINUED | OUTPATIENT
Start: 2022-01-31 | End: 2022-01-31 | Stop reason: HOSPADM

## 2022-01-31 RX ORDER — CARBOXYMETHYLCELLULOSE SODIUM 5 MG/ML
1 SOLUTION/ DROPS OPHTHALMIC 2 TIMES DAILY PRN
Status: DISCONTINUED | OUTPATIENT
Start: 2022-01-31 | End: 2022-02-03 | Stop reason: HOSPADM

## 2022-01-31 RX ORDER — HYDRALAZINE HYDROCHLORIDE 20 MG/ML
10 INJECTION INTRAMUSCULAR; INTRAVENOUS EVERY 4 HOURS PRN
Status: DISCONTINUED | OUTPATIENT
Start: 2022-01-31 | End: 2022-02-03 | Stop reason: HOSPADM

## 2022-01-31 RX ORDER — ONDANSETRON 4 MG/1
4 TABLET, ORALLY DISINTEGRATING ORAL EVERY 6 HOURS PRN
Status: DISCONTINUED | OUTPATIENT
Start: 2022-01-31 | End: 2022-02-03 | Stop reason: HOSPADM

## 2022-01-31 RX ORDER — CEFAZOLIN SODIUM 2 G/100ML
2 INJECTION, SOLUTION INTRAVENOUS
Status: COMPLETED | OUTPATIENT
Start: 2022-01-31 | End: 2022-01-31

## 2022-01-31 RX ORDER — FOLIC ACID 1 MG/1
1 TABLET ORAL DAILY
Status: DISCONTINUED | OUTPATIENT
Start: 2022-02-01 | End: 2022-02-03 | Stop reason: HOSPADM

## 2022-01-31 RX ORDER — HYDROMORPHONE HCL IN WATER/PF 6 MG/30 ML
0.4 PATIENT CONTROLLED ANALGESIA SYRINGE INTRAVENOUS EVERY 5 MIN PRN
Status: DISCONTINUED | OUTPATIENT
Start: 2022-01-31 | End: 2022-01-31 | Stop reason: HOSPADM

## 2022-01-31 RX ORDER — NICOTINE 21 MG/24HR
1 PATCH, TRANSDERMAL 24 HOURS TRANSDERMAL DAILY
Status: DISCONTINUED | OUTPATIENT
Start: 2022-01-31 | End: 2022-02-03 | Stop reason: HOSPADM

## 2022-01-31 RX ORDER — DIAZEPAM 10 MG/2ML
5-10 INJECTION, SOLUTION INTRAMUSCULAR; INTRAVENOUS EVERY 30 MIN PRN
Status: DISCONTINUED | OUTPATIENT
Start: 2022-01-31 | End: 2022-02-02

## 2022-01-31 RX ADMIN — BUPIVACAINE HYDROCHLORIDE 20 ML: 2.5 INJECTION, SOLUTION EPIDURAL; INFILTRATION; INTRACAUDAL at 07:41

## 2022-01-31 RX ADMIN — PHENYLEPHRINE HYDROCHLORIDE 200 MCG: 10 INJECTION INTRAVENOUS at 09:34

## 2022-01-31 RX ADMIN — SODIUM CHLORIDE, POTASSIUM CHLORIDE, SODIUM LACTATE AND CALCIUM CHLORIDE: 600; 310; 30; 20 INJECTION, SOLUTION INTRAVENOUS at 06:00

## 2022-01-31 RX ADMIN — SUGAMMADEX 200 MG: 100 INJECTION, SOLUTION INTRAVENOUS at 10:20

## 2022-01-31 RX ADMIN — GLYCOPYRROLATE 0.2 MG: 0.2 INJECTION, SOLUTION INTRAMUSCULAR; INTRAVENOUS at 09:42

## 2022-01-31 RX ADMIN — ACETAMINOPHEN 975 MG: 325 TABLET ORAL at 13:22

## 2022-01-31 RX ADMIN — LIDOCAINE HYDROCHLORIDE 60 MG: 20 INJECTION, SOLUTION INFILTRATION; PERINEURAL at 07:32

## 2022-01-31 RX ADMIN — MIDAZOLAM 2 MG: 1 INJECTION INTRAMUSCULAR; INTRAVENOUS at 07:22

## 2022-01-31 RX ADMIN — ROCURONIUM BROMIDE 50 MG: 50 INJECTION, SOLUTION INTRAVENOUS at 07:32

## 2022-01-31 RX ADMIN — BUPIVACAINE 20 ML: 13.3 INJECTION, SUSPENSION, LIPOSOMAL INFILTRATION at 07:41

## 2022-01-31 RX ADMIN — HYDROXYZINE HYDROCHLORIDE 25 MG: 25 TABLET ORAL at 16:16

## 2022-01-31 RX ADMIN — PHENYLEPHRINE HYDROCHLORIDE 200 MCG: 10 INJECTION INTRAVENOUS at 07:36

## 2022-01-31 RX ADMIN — DIAZEPAM 10 MG: 5 TABLET ORAL at 16:16

## 2022-01-31 RX ADMIN — DIAZEPAM 10 MG: 5 TABLET ORAL at 22:31

## 2022-01-31 RX ADMIN — PHENYLEPHRINE HYDROCHLORIDE 200 MCG: 10 INJECTION INTRAVENOUS at 09:46

## 2022-01-31 RX ADMIN — LEVETIRACETAM 500 MG: 500 TABLET, FILM COATED ORAL at 21:50

## 2022-01-31 RX ADMIN — FENTANYL CITRATE 50 MCG: 50 INJECTION, SOLUTION INTRAMUSCULAR; INTRAVENOUS at 11:10

## 2022-01-31 RX ADMIN — PROPOFOL 25 MCG/KG/MIN: 10 INJECTION, EMULSION INTRAVENOUS at 07:36

## 2022-01-31 RX ADMIN — BENZOCAINE AND MENTHOL 1 LOZENGE: 15; 3.6 LOZENGE ORAL at 22:28

## 2022-01-31 RX ADMIN — HYDROMORPHONE HYDROCHLORIDE 0.4 MG: 0.2 INJECTION, SOLUTION INTRAMUSCULAR; INTRAVENOUS; SUBCUTANEOUS at 12:20

## 2022-01-31 RX ADMIN — PHENYLEPHRINE HYDROCHLORIDE 100 MCG: 10 INJECTION INTRAVENOUS at 07:32

## 2022-01-31 RX ADMIN — ACETAMINOPHEN 975 MG: 325 TABLET ORAL at 06:42

## 2022-01-31 RX ADMIN — OXYCODONE HYDROCHLORIDE 5 MG: 5 TABLET ORAL at 18:07

## 2022-01-31 RX ADMIN — OXYCODONE HYDROCHLORIDE 5 MG: 5 TABLET ORAL at 12:37

## 2022-01-31 RX ADMIN — NICOTINE 1 PATCH: 21 PATCH, EXTENDED RELEASE TRANSDERMAL at 18:45

## 2022-01-31 RX ADMIN — MAGNESIUM SULFATE HEPTAHYDRATE 4 G: 80 INJECTION, SOLUTION INTRAVENOUS at 06:29

## 2022-01-31 RX ADMIN — ACETAMINOPHEN 975 MG: 325 TABLET ORAL at 21:50

## 2022-01-31 RX ADMIN — FENTANYL CITRATE 100 MCG: 50 INJECTION, SOLUTION INTRAMUSCULAR; INTRAVENOUS at 07:32

## 2022-01-31 RX ADMIN — THIAMINE HYDROCHLORIDE: 100 INJECTION, SOLUTION INTRAMUSCULAR; INTRAVENOUS at 15:53

## 2022-01-31 RX ADMIN — ROCURONIUM BROMIDE 20 MG: 50 INJECTION, SOLUTION INTRAVENOUS at 09:18

## 2022-01-31 RX ADMIN — SODIUM CHLORIDE, POTASSIUM CHLORIDE, SODIUM LACTATE AND CALCIUM CHLORIDE: 600; 310; 30; 20 INJECTION, SOLUTION INTRAVENOUS at 08:15

## 2022-01-31 RX ADMIN — DEXAMETHASONE SODIUM PHOSPHATE 10 MG: 10 INJECTION, SOLUTION INTRAMUSCULAR; INTRAVENOUS at 07:32

## 2022-01-31 RX ADMIN — FENTANYL CITRATE 50 MCG: 50 INJECTION, SOLUTION INTRAMUSCULAR; INTRAVENOUS at 11:32

## 2022-01-31 RX ADMIN — HEPARIN SODIUM 5000 UNITS: 10000 INJECTION, SOLUTION INTRAVENOUS; SUBCUTANEOUS at 06:44

## 2022-01-31 RX ADMIN — PROPOFOL 200 MG: 10 INJECTION, EMULSION INTRAVENOUS at 07:32

## 2022-01-31 RX ADMIN — METRONIDAZOLE 500 MG: 500 INJECTION, SOLUTION INTRAVENOUS at 06:30

## 2022-01-31 RX ADMIN — HYDROMORPHONE HYDROCHLORIDE 0.4 MG: 0.2 INJECTION, SOLUTION INTRAMUSCULAR; INTRAVENOUS; SUBCUTANEOUS at 19:12

## 2022-01-31 RX ADMIN — TRAZODONE HYDROCHLORIDE 100 MG: 50 TABLET ORAL at 21:50

## 2022-01-31 RX ADMIN — OXYCODONE HYDROCHLORIDE 10 MG: 5 TABLET ORAL at 21:51

## 2022-01-31 RX ADMIN — DIAZEPAM 10 MG: 5 TABLET ORAL at 18:07

## 2022-01-31 RX ADMIN — BENZOCAINE AND MENTHOL 1 LOZENGE: 15; 3.6 LOZENGE ORAL at 16:17

## 2022-01-31 RX ADMIN — FENTANYL CITRATE 50 MCG: 50 INJECTION, SOLUTION INTRAMUSCULAR; INTRAVENOUS at 11:17

## 2022-01-31 RX ADMIN — MIRTAZAPINE 30 MG: 30 TABLET ORAL at 21:50

## 2022-01-31 RX ADMIN — PHENYLEPHRINE HYDROCHLORIDE 200 MCG: 10 INJECTION INTRAVENOUS at 09:21

## 2022-01-31 RX ADMIN — ONDANSETRON 4 MG: 2 INJECTION INTRAMUSCULAR; INTRAVENOUS at 06:20

## 2022-01-31 RX ADMIN — PHENYLEPHRINE HYDROCHLORIDE 200 MCG: 10 INJECTION INTRAVENOUS at 10:12

## 2022-01-31 RX ADMIN — OXYCODONE HYDROCHLORIDE 5 MG: 5 TABLET ORAL at 16:16

## 2022-01-31 RX ADMIN — FENTANYL CITRATE 50 MCG: 50 INJECTION, SOLUTION INTRAMUSCULAR; INTRAVENOUS at 11:25

## 2022-01-31 RX ADMIN — SODIUM CHLORIDE, POTASSIUM CHLORIDE, SODIUM LACTATE AND CALCIUM CHLORIDE: 600; 310; 30; 20 INJECTION, SOLUTION INTRAVENOUS at 14:21

## 2022-01-31 RX ADMIN — HYDROMORPHONE HYDROCHLORIDE 0.4 MG: 0.2 INJECTION, SOLUTION INTRAMUSCULAR; INTRAVENOUS; SUBCUTANEOUS at 11:58

## 2022-01-31 RX ADMIN — CEFAZOLIN SODIUM 2 G: 2 INJECTION, SOLUTION INTRAVENOUS at 07:37

## 2022-01-31 RX ADMIN — HYDROMORPHONE HYDROCHLORIDE 0.4 MG: 0.2 INJECTION, SOLUTION INTRAMUSCULAR; INTRAVENOUS; SUBCUTANEOUS at 12:10

## 2022-01-31 RX ADMIN — ONDANSETRON 4 MG: 2 INJECTION INTRAMUSCULAR; INTRAVENOUS at 10:10

## 2022-01-31 RX ADMIN — DEXMEDETOMIDINE HYDROCHLORIDE 0.5 MCG/KG/HR: 4 INJECTION, SOLUTION INTRAVENOUS at 07:45

## 2022-01-31 RX ADMIN — ALBUMIN HUMAN: 0.05 INJECTION, SOLUTION INTRAVENOUS at 09:40

## 2022-01-31 RX ADMIN — HYDROMORPHONE HYDROCHLORIDE 1 MG: 1 INJECTION, SOLUTION INTRAMUSCULAR; INTRAVENOUS; SUBCUTANEOUS at 08:10

## 2022-01-31 RX ADMIN — ONDANSETRON 4 MG: 2 INJECTION INTRAMUSCULAR; INTRAVENOUS at 22:02

## 2022-01-31 RX ADMIN — HYDROMORPHONE HYDROCHLORIDE 0.4 MG: 0.2 INJECTION, SOLUTION INTRAMUSCULAR; INTRAVENOUS; SUBCUTANEOUS at 11:39

## 2022-01-31 RX ADMIN — PHENYLEPHRINE HYDROCHLORIDE 100 MCG: 10 INJECTION INTRAVENOUS at 09:03

## 2022-01-31 RX ADMIN — HYDROMORPHONE HYDROCHLORIDE 0.4 MG: 0.2 INJECTION, SOLUTION INTRAMUSCULAR; INTRAVENOUS; SUBCUTANEOUS at 14:11

## 2022-01-31 RX ADMIN — SODIUM CHLORIDE, POTASSIUM CHLORIDE, SODIUM LACTATE AND CALCIUM CHLORIDE 100 ML/HR: 600; 310; 30; 20 INJECTION, SOLUTION INTRAVENOUS at 11:42

## 2022-01-31 ASSESSMENT — ACTIVITIES OF DAILY LIVING (ADL)
ADLS_ACUITY_SCORE: 4
ADLS_ACUITY_SCORE: 12
ADLS_ACUITY_SCORE: 4

## 2022-01-31 ASSESSMENT — MIFFLIN-ST. JEOR
SCORE: 1233.23
SCORE: 1209.18

## 2022-01-31 NOTE — ANESTHESIA PROCEDURE NOTES
TAP Procedure Note    Pre-Procedure   Staff -        Anesthesiologist:  Arielle Melchor MD       Performed By: anesthesiologist       Location: OR       Procedure Start/Stop Times: 1/31/2022 7:40 AM and 1/31/2022 7:45 AM       Pre-Anesthestic Checklist: patient identified, IV checked, site marked, risks and benefits discussed, informed consent, monitors and equipment checked, pre-op evaluation, at physician/surgeon's request and post-op pain management  Timeout:       Correct Patient: Yes        Correct Procedure: Yes        Correct Site: Yes        Correct Position: Yes        Correct Laterality: Yes        Site Marked: Yes  Procedure Documentation  Procedure: TAP       Laterality: bilateral       Patient Position: supine       Patient Prep/Sterile Barriers: sterile gloves, mask       Skin prep: Chloraprep       Needle Type: other (echogenic stimuplex)       Needle Gauge: 20.        Needle Length (Inches): 4        Ultrasound guided       1. Ultrasound was used to identify targeted nerve, plexus, vascular marker, or fascial plane and place a needle adjacent to it in real-time.       2. Ultrasound was used to visualize the spread of anesthetic in close proximity to the above referenced structure.       3. A permanent image is entered into the patient's record.       4. The visualized anatomic structures appeared normal.       5. There were no apparent abnormal pathologic findings.    Assessment/Narrative         The placement was negative for: blood aspirated, painful injection and site bleeding       Paresthesias: No.     Bolus given via needle..        Secured via.        Insertion/Infusion Method: Single Shot       Complications: none    Medication(s) Administered   Bupivacaine 0.25% PF (Infiltration), 20 mL  Bupivacaine liposome (Exparel) 1.3% LA inj susp (Infiltration), 20 mL  Medication Administration Time: 1/31/2022 7:41 AM

## 2022-01-31 NOTE — ANESTHESIA POSTPROCEDURE EVALUATION
Patient: Evelyn Hudson    Procedure: Procedure(s):  ROBOTIC COLECTOMY RIGHT,  TRANSANAL EXCISION OF RECTAL POLYP       Diagnosis:Colon polyp [K63.5]  Diagnosis Additional Information: No value filed.    Anesthesia Type:  General    Note:  Disposition: Inpatient   Postop Pain Control: Uneventful            Sign Out: Well controlled pain   PONV: No   Neuro/Psych: Uneventful            Sign Out: Acceptable/Baseline neuro status   Airway/Respiratory: Uneventful            Sign Out: Acceptable/Baseline resp. status   CV/Hemodynamics: Uneventful            Sign Out: Acceptable CV status; No obvious hypovolemia; No obvious fluid overload   Other NRE: NONE   DID A NON-ROUTINE EVENT OCCUR?            Last vitals:  Vitals Value Taken Time   /67 01/31/22 1215   Temp 36.7  C (98.1  F) 01/31/22 1200   Pulse 74 01/31/22 1215   Resp 8 01/31/22 1215   SpO2 98 % 01/31/22 1215   Vitals shown include unvalidated device data.    Electronically Signed By: Arielle Melchor MD  January 31, 2022  12:17 PM

## 2022-01-31 NOTE — BRIEF OP NOTE
POSTOPERATIVE / POSTPROCEDURE NOTE - IMMEDIATE :    Surgeon(s)/Proceduralist(s) and Assistants (if any):  Surgeon(s):  Abner Wong MD  Circulator: Seema Tai RN; Renae Valladares RN  Relief Circulator: Lanette Arellano RN  Relief Scrub: Carole Machado  Scrub Person: Sly Mina    Procedure(s):  Robotic-assisted right hemicolectomy  Transanal excision of rectal polyp    Procedure(s) findings:   Tattoo in proximal transverse colon  Polyp at appendiceal orifice and in proximal transverse colon both visualized in specimen on open and return    Specimen(s) removed: Yes    (EBL) Estimated blood loss (ml): 15    Postoperative/Postprocedure Diagnosis: unresectable colon polyps at appendiceal orifice, hepatic flexure, and rectum      Bette Hernandez MD  Colon and Rectal Surgery Fellow    ADDENDUM:    PATIENT DATA  Indicate Y or N:  Home O2 No  Hemodialysis No  Transplant patient No  Cirrhosis No  Steroids in last 30 days No  Immunomodulators in last 30 days No  Anticoagulation at time of surgery No   List medication N/A  Prior abdominal surgery Yes  Pelvic irradiation No    Albumin within 30 days if known 4   Hgb within 30 days if known 15.5  Cr within 30 days if known 0.64  Body mass index is 22.65 kg/m .    OR DATA  Emergent No   <24 hours No   <1 week No  Bowel Prep (Y or N) Yes  Antibiotics (Y or N) Yes  DVT prophylaxis    Heparin Yes   SCD Yes   None No  Drain No  ASA (1,2,3,4,5 if unknown) 3  OR time (min) 107  Stents No  Transfuse >/= 2U No  Anastomosis   Stapled Yes   Handsewn No  Leak Test (pos, neg, not done) N/A

## 2022-01-31 NOTE — ANESTHESIA CARE TRANSFER NOTE
Patient: Evelyn Hudson    Procedure: Procedure(s):  ROBOTIC COLECTOMY RIGHT,  TRANSANAL EXCISION OF RECTAL POLYP       Diagnosis: Colon polyp [K63.5]  Diagnosis Additional Information: No value filed.    Anesthesia Type:   General     Note:    Oropharynx: oropharynx clear of all foreign objects  Level of Consciousness: drowsy  Oxygen Supplementation: face mask  Level of Supplemental Oxygen (L/min / FiO2): 6  Independent Airway: airway patency satisfactory and stable  Dentition: dentition unchanged  Vital Signs Stable: post-procedure vital signs reviewed and stable  Report to RN Given: handoff report given  Patient transferred to: PACU    Handoff Report: Identifed the Patient, Identified the Reponsible Provider, Reviewed the pertinent medical history, Discussed the surgical course, Reviewed Intra-OP anesthesia mangement and issues during anesthesia, Set expectations for post-procedure period and Allowed opportunity for questions and acknowledgement of understanding      Vitals:  Vitals Value Taken Time   /72 01/31/22 1033   Temp 98.0    Pulse 84 01/31/22 1034   Resp 17 01/31/22 1034   SpO2 100 % 01/31/22 1034   Vitals shown include unvalidated device data.    Electronically Signed By: ADRIANE NAPIER CRNA  January 31, 2022  10:36 AM

## 2022-01-31 NOTE — PLAN OF CARE
Problem: Bleeding (Surgery Nonspecified)  Goal: Absence of Bleeding  Outcome: Improving     Problem: Bleeding (Surgery Nonspecified)  Goal: Absence of Bleeding  Outcome: Improving     Problem: Infection (Surgery Nonspecified)  Goal: Absence of Infection Signs and Symptoms  Outcome: Improving   Pt arrived on the unit at 1405 from PACU.  Pt able to stand to transfer to bed.  Shortly after, pt indicated her pain level was a 9/10, requesting IV pain meds.  PRN IV Dilaudid given for abdominal pain, ice packs given to pt as well.  Pt's lap sites are clean, dry, intact, with surgical glue, midline incision is also c/d/I.    Pt tolerating clear liquids, no nausea or coughing with liquids.  SCD's in place, meza is patent and draining nader urine.  IVF @ 100 ml/hr  Discussed with patient plan to ambulate later this evening with the next shift, as well as monitoring for return of bowel function.  Will continue to monitor and update with changes.

## 2022-01-31 NOTE — ANESTHESIA PREPROCEDURE EVALUATION
Anesthesia Pre-Procedure Evaluation    Patient: Evelyn Hudson   MRN: [id number] : 1970        Preoperative Diagnosis: Colon polyp [K63.5]    Procedure : Procedure(s):  ROBOTIC COLECTOMY RIGHT,  TRANSANAL EXCISION OF RECTAL POLYP          Past Medical History:   Diagnosis Date     Acne      Adenoma of colon      Alcohol withdrawal seizure (H)      Alcoholism /alcohol abuse      Allergic rhinitis      Anxiety      Anxiety      Chronic pain      Hypertension      Hypokalemia      Hypomagnesemia      MTHFR gene mutation      Seasonal allergies       Past Surgical History:   Procedure Laterality Date     COLON SURGERY       ENDOMETRIAL ABLATION       INSERT INTRACORONARY STENT N/A 2017    Procedure: EXCISION BACK AND RIGHT ARM LIPOMA;  Surgeon: Rickey Anne MD;  Location: [facility] Main OR;  Service:      IR INTERCOSTAL STEROID INJECTION SINGLE LEVEL  2020     IR INTERCOSTAL STEROID INJECTION SINGLE LEVEL  2020     RECTAL POLYPECTOMY       RECTAL PROLAPSE REPAIR       SURGICAL PATHOLOGY EXAM       TUBAL LIGATION Bilateral 6/3/2014    Procedure: BILATERAL LAPAROSCOPIC TUBAL LIGATION ;  Surgeon: Lorena Jiménez MD;  Location: [facility] Main OR;  Service:      WISDOM TOOTH EXTRACTION        Allergies   Allergen Reactions     Ciprofloxacin Anaphylaxis     Throat swelling      Social History     Tobacco Use     Smoking status: Current Every Day Smoker     Packs/day: 1.00     Types: Cigarettes     Smokeless tobacco: Never Used   Substance Use Topics     Alcohol use: Yes     Alcohol/week: 18.0 standard drinks     Types: 18 Standard drinks or equivalent per week     Comment: 12-18 White Claws daily      Wt Readings from Last 1 Encounters:   21 63.5 kg (140 lb)        Anesthesia Evaluation            ROS/MED HX  ENT/Pulmonary:     (+) tobacco use, Current use,     Neurologic:     (+) seizures (from EtOH withdrawal),     Cardiovascular:     (+) hypertension-----    METS/Exercise  Tolerance: >4 METS    Hematologic: Comments: MTHFR gene mutation, no DVT hx      Musculoskeletal:       GI/Hepatic: Comment: Colon and rectal polyps   (-) GERD   Renal/Genitourinary:  - neg Renal ROS     Endo: Comment: Hypomagnesemia      Psychiatric/Substance Use: Comment: H/o EtOH withdrawal.  Recent detox 2 weeks ago.  Denies EtOH in past 2 weeks.  On Kepra for w/d seizure.  Last seizure years ago.    (+) psychiatric history anxiety alcohol abuse     Infectious Disease:       Malignancy:       Other:      (+) , H/O Chronic Pain,        Physical Exam    Airway        Mallampati: II   TM distance: > 3 FB   Neck ROM: full   Mouth opening: > 3 cm    Respiratory Devices and Support         Dental         B=Bridge, C=Chipped, L=Loose, M=Missing    Cardiovascular   cardiovascular exam normal          Pulmonary   pulmonary exam normal                OUTSIDE LABS:  CBC:   Lab Results   Component Value Date    WBC 4.7 01/12/2022    WBC 7.7 12/22/2021    HGB 14.3 01/12/2022    HGB 13.9 12/22/2021    HCT 41.1 01/12/2022    HCT 39.1 12/22/2021     01/12/2022     12/22/2021     BMP:   Lab Results   Component Value Date     (L) 01/20/2022     01/12/2022    POTASSIUM 4.1 01/20/2022    POTASSIUM 4.3 01/13/2022    CHLORIDE 95 (L) 01/20/2022    CHLORIDE 97 01/12/2022    CO2 23 01/20/2022    CO2 29 01/12/2022    BUN 5 (L) 01/20/2022    BUN 6 (L) 01/12/2022    CR 0.64 01/20/2022    CR 0.57 01/12/2022    GLC 80 01/20/2022     (H) 01/12/2022     COAGS:   Lab Results   Component Value Date    PTT 35 12/22/2021    INR 0.98 12/22/2021     POC:   Lab Results   Component Value Date    HCG Negative 01/12/2022    HCGS Negative 08/27/2021     HEPATIC:   Lab Results   Component Value Date    ALBUMIN 4.0 01/20/2022    PROTTOTAL 7.2 01/20/2022    ALT 25 01/20/2022    AST 32 01/20/2022     (H) 01/12/2022    ALKPHOS 145 (H) 01/20/2022    BILITOTAL 0.2 01/20/2022     OTHER:   Lab Results   Component Value Date     LACT 0.7 08/28/2021    YASMIN 9.8 01/20/2022    PHOS 4.5 08/31/2021    MAG 1.9 01/13/2022    LIPASE 52 11/25/2021    TSH 0.26 (L) 01/12/2022    T4 0.83 01/12/2022       Anesthesia Plan    ASA Status:  3   NPO Status:  NPO Appropriate    Anesthesia Type: General.     - Airway: ETT   Induction: Propofol, Intravenous.   Maintenance: Balanced.        Consents    Anesthesia Plan(s) and associated risks, benefits, and realistic alternatives discussed. Questions answered and patient/representative(s) expressed understanding.     - Discussed: Risks, Benefits and Alternatives for BOTH SEDATION and the PROCEDURE were discussed     - Discussed with:  Patient      - Extended Intubation/Ventilatory Support Discussed: No.      - Patient is DNR/DNI Status: No    Use of blood products discussed: Yes.     - Discussed with: Patient.     - Consented: consented to blood products            Reason for refusal: other.     Postoperative Care    Pain management: IV analgesics, Peripheral nerve block (Single Shot).   PONV prophylaxis: Ondansetron (or other 5HT-3), Dexamethasone or Solumedrol, Background Propofol Infusion     Comments:    Other Comments: Bilateral TAP blocks for post operative pain relief with Exparel per surgeon's order.  Pt is also requesting TAP blocks and agrees to the procedure.  Risks including seizures from LAST were discussed.    Decadron  Zofran  Magnesium  Precedex    Background propofol                Arielle Melchor MD

## 2022-01-31 NOTE — PROGRESS NOTES
[facility]    PROGRESS NOTE - Hospitalist Service    Assessment and Plan      Evelyn Hudson is a 51 year old old female     Chronic alcohol abuse  ---earlier this month admitted to psychiatry stating she was drinking 18 White Claw cans a day  ---previous hx alcohol withdrawal seizures; has been on keppra  --- Note, colorectal surgery has added CIWA protocol and agree with this.  Would monitor  --- I would continue home Keppra and will plan to order    Hypertension  --- Added back home propranolol with holding parameters; he states that she is taking  ---also on meotprolol prn    PTSD/anxiety  --- Continue home trazodone, mirtazapine, Lexapro, and as needed hydroxyzine    Tobacco abuse--gum ordered.  We will add patch per her request    Colon polyps   ---status post Robotic-assisted right hemicolectomy  Transanal excision of rectal polyp  --- On heparin for DVT prophylaxis  --- Check a hemoglobin in the morning to monitor for acute blood loss  --- Await pathology  --- Colorectal surgery monitoring    COVID STATUS fully vaccinated.  Testing 1/29 negative  VTE prophylaxis:  Heparin subcutaneous and scd  DIET: Orders Placed This Encounter      Clear Liquid Diet    Disposition; home  Barriers to discharge: surgical progression per CRS  Code Status: Full Code    Active Problems:    S/P right colectomy      Subjective:  Patient seen and chart reviewed  Consult requested by CRS.    Past medical history reviewed with patient.  She has a history of chronic relapsing alcoholism.  She was last in detox earlier this month.  She initially states she did not drink until the day she got out of there and then admits that she had 2 beverages on Saturday.  She denies history of more beverages since then and has been mainly abstinent.  She feels she has strong family support.  She mainly went through withdrawal and detox in the little bit at home has felt stable.  She does take medication for PTSD and  anxiety.  Also on medication for acne as well as hypertension.    Pain is controlled.  She is Covid vaccinated and boosted.  Smokes 1 pack/day and would like nicotine patch      PHYSICAL EXAM  B/P: 123/87, T: 97.8, P: 89, R: 14     Intake/Output Summary (Last 24 hours) at 1/31/2022 1454  Last data filed at 1/31/2022 1326  Gross per 24 hour   Intake 2205 ml   Output 140 ml   Net 2065 ml      Vitals:    01/27/22 1400 01/31/22 0543 01/31/22 1400   Weight: 65.1 kg (143 lb 9.6 oz) 61.7 kg (136 lb 1.6 oz) 64.1 kg (141 lb 4.8 oz)       General Appearance: Pleasant, mildly anxious, no apparent distress.  HEENT: Oropharynx moist  Respiratory: Clear to auscultation bilaterally  Cardiovascular: Regular rate and rhythm without murmurs rubs or gallops  GI: Incisions in abdomen seen, they are intact.  Mild diffuse tenderness.  Skin: Incisions closed per above.  No open areas or rashes noted.  Musculoskeletal: SCDs on but no significant lower extremity edema.  Neurologic: Patient does appear mildly anxious but not overtly tremulous.  Speech is regular rate and rhythm and thought process appears intact.      PERTINENT LABS/IMAGING:    Recent Results (from the past 24 hour(s))   Hemoglobin    Collection Time: 01/31/22  5:51 AM   Result Value Ref Range    Hemoglobin 15.5 11.7 - 15.7 g/dL   Adult Type and Screen    Collection Time: 01/31/22  5:51 AM   Result Value Ref Range    ABO/RH(D) B POS     Antibody Screen Negative Negative    SPECIMEN EXPIRATION DATE [id number]    Extra Red Top Tube    Collection Time: 01/31/22  5:51 AM   Result Value Ref Range    Hold Specimen JIC    Platelet count    Collection Time: 01/31/22  5:51 AM   Result Value Ref Range    Platelet Count 242 150 - 450 10e3/uL        POC US Guidance Needle Placement   Final Result           Maribell Rachel MD  [facility] Medicine Service  [phone]

## 2022-01-31 NOTE — OP NOTE
Procedure Date: 01/31/2022    PREOPERATIVE DIAGNOSIS:  Appendiceal orifice, hepatic flexure, and rectal polyps.    POSTOPERATIVE DIAGNOSIS:  Appendiceal orifice, hepatic flexure, and rectal polyps.    OPERATION:  Robotic-assisted right hemicolectomy with extracorporeal anastomosis and transanal excision of rectal polyp.    SURGEON:  Abner Wong MD    ASSISTANT:  Tristin, [facility] colorectal resident.    ANESTHESIA:  General.    INDICATIONS FOR PROCEDURE:  The patient is a 51-year-old woman with a history of a rectal polyp, which has recurred. She also has a polyp at her appendiceal orifice and a large polyp at her hepatic flexure, which has been judged by the gastroenterologist to be not removable colonoscopically.  She presents for robotic-assisted right hemicolectomy and then a transanal excision of the rectal polyp at the same setting.  Risks, benefits, expected outcome discussed including 5% chance of anastomotic leak, low chance of bleeding, infection, and even remote chance of death as a result of the procedure.  She is eager to proceed.    DESCRIPTION OF PROCEDURE:  After the uneventful induction of general endotracheal anesthesia, the patient was placed comfortably in the supine position on the operating table.  Gomez catheter inserted under sterile technique.  The abdomen was prepped and draped in the usual sterile fashion.    We insufflated the abdomen with a Veress needle in left upper quadrant that allowed us to place 3 robotic ports and assistant port.  We put the patient left side down and visualized the right abdomen.  The right colon was fairly redundant.  We saw the tattoo in the proximal transverse colon.    The robot was then docked and we used the robot to mobilize the terminal ileum, right colon and transverse colon in a lateral to medial fashion.  Care was taken to the positively identify and spare the right ureter and the duodenum.  After complete mobilization of the right  colon, we undocked the robot.  A small incision was made in the supraumbilical area in the midline and through this, we were able to deliver the entire right colon without difficulty.  Examining other abdominal contents, the liver seemed to be normal.  The ileocolic vessels were then doubly clamped and ligated with 0 silk suture.  The mesentery of the terminal ileum was divided between clamps and secured and finally, the distal terminal ileum was prepared for anastomosis.    In a similar fashion, the hepatic flexure was mobilized and the mesentery harvested until we had reached a point distal to the tattoo at which point we prepared the bowel for anastomosis.    The mid transverse colon and terminal ileum were laid side by side.  Two enterotomies were made.  Single firing of the TRISTAN-75 stapler created side-to-side anastomosis. Single firing of the TA 60 stapler excluded the enterotomies and specimen.  Specimen was sent to pathology, where it was opened and returned to us. We could indeed verify we had removed the polyp in the proximal transverse colon as well as the appendiceal orifice.    Crotch of the anastomosis was reinforced with 3-0 Vicryl and the ends of transverse staple line imbricated as well.    Prior to closing, we examined the anterior surface of the stomach where the Veress needle had caused a very small serosal abrasion to the surface of the stomach.  We imbricated this with 3-0 Vicryl.  No other pathology was seen.  Certainly no evidence of cirrhosis was seen.    The anastomosis wrapped in omentum was placed up in the right upper quadrant. After irrigating and verifying that hemostasis was complete, we changed gloves, instruments and gowns and used a closing tray to close the midline wound with continuous running #1 PDS, superficial tissues irrigated, skin closed with 4-0 Monocryl as well as the port sites.    We then flipped the patient to jackknife prone and prepped and draped in the perineum.   Then 10 mL of 0.25% Marcaine with epinephrine was instilled in the perineal tissues for postop pain relief.  A well-lubricated [place] bivalve anoscope was inserted and exam performed.  In the posterior midline about 5 cm from the anal verge, there was a recurrent polyp in the scar from her previous transanal excision.  This also was redundant enough that we could simply place a clamp across its base, excise the polyp and then oversew the clamp portion of the rectum with a 2-0 Vicryl, remove the clamp and secure the tie.  Hemostasis was complete.  There was no compromise of the rectal lumen.  The polyp was removed in its entirety.    The patient tolerated the procedure well, was returned to recovery room in good condition.    ESTIMATED BLOOD LOSS:  15 mL for both a combined procedures.    COUNTS:  Correct.    SPECIMEN:  There were 2 specimens; one was right colon for open return and then permanent and the other was rectal polyp for permanent.    Abner Wong MD        D: 2022   T: 2022   MT: victoria    Name:     BEKA BAKER  MRN:      [id number]        Account:        [id number]   :      1970           Procedure Date: 2022     Document: [id number]

## 2022-01-31 NOTE — PHARMACY-ADMISSION MEDICATION HISTORY
Pharmacy Note - Admission Medication History    Pertinent Provider Information: Patient is requesting a nicotine patch for use while admitted.   ______________________________________________________________________    Prior To Admission (PTA) med list completed and updated in EMR.       PTA Med List   Medication Sig Note Last Dose     amoxicillin (AMOXIL) 500 MG capsule Take 500 mg by mouth 2 times daily  1/28/2022: 30 day supply for acne 1/30/2022     Biotin 5000 MCG TABS Take 5,000 mcg by mouth daily   Past Week     calcium-vitamin D-vitamin K (VIACTIV) 500-500-40 MG-UNT-MCG CHEW Take 1 tablet by mouth 2 times daily  Past Week     carboxymethylcellulose (REFRESH LIQUIGEL) 1 % ophthalmic solution [CARBOXYMETHYLCELLULOSE (REFRESH LIQUIGEL) 1 % OPHTHALMIC SOLUTION] Apply 1 drop to eye 2 (two) times a day as needed.  Past Week     cholecalciferol (VITAMIN D3) 125 mcg (5000 units) capsule Take 125 mcg by mouth daily   Past Week     Cyanocobalamin (B-12) 1000 MCG TBCR Take 1,000 mcg by mouth daily  Past Week     cyclobenzaprine (FLEXERIL) 10 MG tablet Take 1 tablet (10 mg) by mouth 3 times daily as needed for muscle spasms  More than a month     escitalopram (LEXAPRO) 20 MG tablet Take 1 tablet (20 mg) by mouth daily  1/31/2022 at 0330     fluticasone (FLONASE) 50 MCG/ACT nasal spray Spray 1 spray into both nostrils daily as needed for rhinitis or allergies  1/30/2022     folic acid (FOLVITE) 1 MG tablet Take 1 tablet (1 mg) by mouth daily  1/31/2022 at 0330     glucosamine-chondroitin 500-400 mg cap Take 1 capsule by mouth daily   Past Week     hydrOXYzine (ATARAX) 25 MG tablet Take 1 tablet (25 mg) by mouth 3 times daily as needed for anxiety  1/30/2022     lactobacillus rhamnosus, GG, (CULTURELL) capsule Take 1 capsule by mouth every evening   Past Week     levETIRAcetam (KEPPRA) 500 MG tablet Take 1 tablet (500 mg) by mouth 2 times daily  1/31/2022 at 0330     loratadine-pseudoePHEDrine (GISELLA-ITIN D 24 HOUR)   MG 24 hr tablet Take 1 tablet by mouth daily as needed for allergies or congestion   Past Week     mirtazapine (REMERON) 30 MG tablet Take 30 mg by mouth At Bedtime 1/28/2022: Last filled 11/29 #30 Past Month     multiple vitamin  tablet TABS Take 1 tablet by mouth daily  Past Week     naltrexone (DEPADE/REVIA) 50 MG tablet Take 1 tablet (50 mg) by mouth daily 1/28/2022: Last filled 10/16 #30      nicotine polacrilex (NICORETTE) 4 MG gum Place 4 mg inside cheek every hour as needed for smoking cessation   Past Week     nystatin (MYCOSTATIN) 504009 UNIT/GM external cream Apply topically daily as needed   1/31/2022     Omega-3 Fatty Acids (FISH OIL) 1200 MG capsule Take 1,200 mg by mouth daily  Past Week     propranolol ER (INDERAL LA) 80 MG 24 hr capsule Take 80 mg by mouth daily 1/28/2022: Last filled 11/29 #30 1/31/2022 at 0330     thiamine (B-1) 100 MG tablet Take 1 tablet (100 mg) by mouth daily  Has not started     traZODone (DESYREL) 50 MG tablet Take 1-2 tablets ( mg) by mouth At Bedtime  1/30/2022     triamcinolone (ARISTOCORT HP) 0.5 % external cream Apply topically 2 times daily as needed   Past Week     vitamin B6 (PYRIDOXINE) 100 MG tablet Take 100 mg by mouth daily  Past Week at Unknown time     vitamin C (ASCORBIC ACID) 1000 MG TABS Take 1,000 mg by mouth daily  Past Week     vitamin E (TOCOPHEROL) 400 units (180 mg) capsule Take 400 Units by mouth daily   Past Week       Information source(s): Patient, Hospital records and Prescription bottles    Method of interview communication: in-person    Patient was asked about OTC/herbal products specifically.  PTA med list reflects this.    Based on the pharmacist's assessment, the PTA med list information appears reliable    Allergies were reviewed, assessed, and updated with the patient.      Patient did not bring any medications to the hospital and can't retrieve from home. No multi-dose medications are available for use during hospital stay.       Thank you for the opportunity to participate in the care of this patient.      Reese Mccormick, [facility]     1/31/2022     6:39 AM

## 2022-02-01 LAB
ANION GAP SERPL CALCULATED.3IONS-SCNC: 9 MMOL/L (ref 5–18)
BUN SERPL-MCNC: 4 MG/DL (ref 8–22)
CALCIUM SERPL-MCNC: 8 MG/DL (ref 8.5–10.5)
CHLORIDE BLD-SCNC: 97 MMOL/L (ref 98–107)
CO2 SERPL-SCNC: 25 MMOL/L (ref 22–31)
CREAT SERPL-MCNC: 0.62 MG/DL (ref 0.6–1.1)
ERYTHROCYTE [DISTWIDTH] IN BLOOD BY AUTOMATED COUNT: 14.9 % (ref 10–15)
GFR SERPL CREATININE-BSD FRML MDRD: >90 ML/MIN/1.73M2
GLUCOSE BLD-MCNC: 109 MG/DL (ref 70–125)
GLUCOSE BLDC GLUCOMTR-MCNC: 114 MG/DL (ref 70–99)
HCT VFR BLD AUTO: 34.8 % (ref 35–47)
HGB BLD-MCNC: 12 G/DL (ref 11.7–15.7)
MAGNESIUM SERPL-MCNC: 1.6 MG/DL (ref 1.8–2.6)
MCH RBC QN AUTO: 33.6 PG (ref 26.5–33)
MCHC RBC AUTO-ENTMCNC: 34.5 G/DL (ref 31.5–36.5)
MCV RBC AUTO: 98 FL (ref 78–100)
PHOSPHATE SERPL-MCNC: 1.5 MG/DL (ref 2.5–4.5)
PLATELET # BLD AUTO: 204 10E3/UL (ref 150–450)
POTASSIUM BLD-SCNC: 3.5 MMOL/L (ref 3.5–5)
RBC # BLD AUTO: 3.57 10E6/UL (ref 3.8–5.2)
SODIUM SERPL-SCNC: 131 MMOL/L (ref 136–145)
WBC # BLD AUTO: 9.5 10E3/UL (ref 4–11)

## 2022-02-01 PROCEDURE — 258N000003 HC RX IP 258 OP 636: Performed by: STUDENT IN AN ORGANIZED HEALTH CARE EDUCATION/TRAINING PROGRAM

## 2022-02-01 PROCEDURE — 999N000033 HC STATISTIC CHRONIC PULMONARY DISEASE SPECIALIST

## 2022-02-01 PROCEDURE — 999N000032 HC STATISTIC CHRONIC DISEASE SPECIALIST RT CONSULT

## 2022-02-01 PROCEDURE — 84100 ASSAY OF PHOSPHORUS: CPT | Performed by: INTERNAL MEDICINE

## 2022-02-01 PROCEDURE — 250N000011 HC RX IP 250 OP 636: Performed by: STUDENT IN AN ORGANIZED HEALTH CARE EDUCATION/TRAINING PROGRAM

## 2022-02-01 PROCEDURE — 80048 BASIC METABOLIC PNL TOTAL CA: CPT | Performed by: STUDENT IN AN ORGANIZED HEALTH CARE EDUCATION/TRAINING PROGRAM

## 2022-02-01 PROCEDURE — 250N000013 HC RX MED GY IP 250 OP 250 PS 637: Performed by: FAMILY MEDICINE

## 2022-02-01 PROCEDURE — 83735 ASSAY OF MAGNESIUM: CPT | Performed by: INTERNAL MEDICINE

## 2022-02-01 PROCEDURE — 99232 SBSQ HOSP IP/OBS MODERATE 35: CPT | Performed by: FAMILY MEDICINE

## 2022-02-01 PROCEDURE — 250N000013 HC RX MED GY IP 250 OP 250 PS 637: Performed by: STUDENT IN AN ORGANIZED HEALTH CARE EDUCATION/TRAINING PROGRAM

## 2022-02-01 PROCEDURE — 85027 COMPLETE CBC AUTOMATED: CPT | Performed by: STUDENT IN AN ORGANIZED HEALTH CARE EDUCATION/TRAINING PROGRAM

## 2022-02-01 PROCEDURE — 120N000001 HC R&B MED SURG/OB

## 2022-02-01 PROCEDURE — 99407 BEHAV CHNG SMOKING > 10 MIN: CPT

## 2022-02-01 PROCEDURE — 36415 COLL VENOUS BLD VENIPUNCTURE: CPT | Performed by: STUDENT IN AN ORGANIZED HEALTH CARE EDUCATION/TRAINING PROGRAM

## 2022-02-01 RX ORDER — KETOROLAC TROMETHAMINE 30 MG/ML
30 INJECTION, SOLUTION INTRAMUSCULAR; INTRAVENOUS EVERY 6 HOURS PRN
Status: DISCONTINUED | OUTPATIENT
Start: 2022-02-01 | End: 2022-02-03 | Stop reason: HOSPADM

## 2022-02-01 RX ADMIN — ONDANSETRON 4 MG: 2 INJECTION INTRAMUSCULAR; INTRAVENOUS at 12:16

## 2022-02-01 RX ADMIN — DIAZEPAM 10 MG: 5 TABLET ORAL at 03:53

## 2022-02-01 RX ADMIN — HEPARIN SODIUM 5000 UNITS: 5000 INJECTION, SOLUTION INTRAVENOUS; SUBCUTANEOUS at 05:49

## 2022-02-01 RX ADMIN — OXYCODONE HYDROCHLORIDE 10 MG: 5 TABLET ORAL at 10:09

## 2022-02-01 RX ADMIN — POTASSIUM & SODIUM PHOSPHATES POWDER PACK 280-160-250 MG 2 PACKET: 280-160-250 PACK at 21:27

## 2022-02-01 RX ADMIN — ACETAMINOPHEN 975 MG: 325 TABLET ORAL at 05:39

## 2022-02-01 RX ADMIN — HEPARIN SODIUM 5000 UNITS: 5000 INJECTION, SOLUTION INTRAVENOUS; SUBCUTANEOUS at 21:27

## 2022-02-01 RX ADMIN — HYDROMORPHONE HYDROCHLORIDE 0.4 MG: 0.2 INJECTION, SOLUTION INTRAMUSCULAR; INTRAVENOUS; SUBCUTANEOUS at 08:25

## 2022-02-01 RX ADMIN — KETOROLAC TROMETHAMINE 30 MG: 30 INJECTION, SOLUTION INTRAMUSCULAR at 21:27

## 2022-02-01 RX ADMIN — TRAZODONE HYDROCHLORIDE 50 MG: 50 TABLET ORAL at 23:34

## 2022-02-01 RX ADMIN — POTASSIUM & SODIUM PHOSPHATES POWDER PACK 280-160-250 MG 2 PACKET: 280-160-250 PACK at 14:05

## 2022-02-01 RX ADMIN — ACETAMINOPHEN 975 MG: 325 TABLET ORAL at 21:26

## 2022-02-01 RX ADMIN — ESCITALOPRAM OXALATE 20 MG: 20 TABLET ORAL at 09:46

## 2022-02-01 RX ADMIN — POTASSIUM & SODIUM PHOSPHATES POWDER PACK 280-160-250 MG 2 PACKET: 280-160-250 PACK at 09:48

## 2022-02-01 RX ADMIN — PROPRANOLOL HYDROCHLORIDE 80 MG: 80 CAPSULE, EXTENDED RELEASE ORAL at 09:47

## 2022-02-01 RX ADMIN — OXYCODONE HYDROCHLORIDE 10 MG: 5 TABLET ORAL at 23:34

## 2022-02-01 RX ADMIN — HYDROMORPHONE HYDROCHLORIDE 0.4 MG: 0.2 INJECTION, SOLUTION INTRAMUSCULAR; INTRAVENOUS; SUBCUTANEOUS at 12:12

## 2022-02-01 RX ADMIN — MIRTAZAPINE 30 MG: 30 TABLET ORAL at 21:26

## 2022-02-01 RX ADMIN — HEPARIN SODIUM 5000 UNITS: 5000 INJECTION, SOLUTION INTRAVENOUS; SUBCUTANEOUS at 14:05

## 2022-02-01 RX ADMIN — Medication 1 TABLET: at 08:32

## 2022-02-01 RX ADMIN — FOLIC ACID 1 MG: 1 TABLET ORAL at 08:32

## 2022-02-01 RX ADMIN — HYDROXYZINE HYDROCHLORIDE 25 MG: 25 TABLET ORAL at 05:40

## 2022-02-01 RX ADMIN — HYDROMORPHONE HYDROCHLORIDE 0.2 MG: 0.2 INJECTION, SOLUTION INTRAMUSCULAR; INTRAVENOUS; SUBCUTANEOUS at 00:10

## 2022-02-01 RX ADMIN — KETOROLAC TROMETHAMINE 30 MG: 30 INJECTION, SOLUTION INTRAMUSCULAR at 14:01

## 2022-02-01 RX ADMIN — ONDANSETRON 4 MG: 4 TABLET, ORALLY DISINTEGRATING ORAL at 03:49

## 2022-02-01 RX ADMIN — LEVETIRACETAM 500 MG: 500 TABLET, FILM COATED ORAL at 21:26

## 2022-02-01 RX ADMIN — LEVETIRACETAM 500 MG: 500 TABLET, FILM COATED ORAL at 08:33

## 2022-02-01 RX ADMIN — HYDROXYZINE HYDROCHLORIDE 25 MG: 25 TABLET ORAL at 12:29

## 2022-02-01 RX ADMIN — Medication 100 MG: at 08:31

## 2022-02-01 RX ADMIN — ACETAMINOPHEN 975 MG: 325 TABLET ORAL at 13:59

## 2022-02-01 RX ADMIN — SODIUM CHLORIDE, POTASSIUM CHLORIDE, SODIUM LACTATE AND CALCIUM CHLORIDE: 600; 310; 30; 20 INJECTION, SOLUTION INTRAVENOUS at 12:24

## 2022-02-01 RX ADMIN — SODIUM CHLORIDE, POTASSIUM CHLORIDE, SODIUM LACTATE AND CALCIUM CHLORIDE: 600; 310; 30; 20 INJECTION, SOLUTION INTRAVENOUS at 03:55

## 2022-02-01 RX ADMIN — OXYCODONE HYDROCHLORIDE 10 MG: 5 TABLET ORAL at 18:20

## 2022-02-01 RX ADMIN — HYDROMORPHONE HYDROCHLORIDE 0.4 MG: 0.2 INJECTION, SOLUTION INTRAMUSCULAR; INTRAVENOUS; SUBCUTANEOUS at 05:33

## 2022-02-01 ASSESSMENT — ACTIVITIES OF DAILY LIVING (ADL)
ADLS_ACUITY_SCORE: 4
DEPENDENT_IADLS:: INDEPENDENT
ADLS_ACUITY_SCORE: 4

## 2022-02-01 NOTE — PLAN OF CARE
Problem: Pain (Surgery Nonspecified)  Goal: Acceptable Pain Control  Outcome: No Change     Problem: Postoperative Nausea and Vomiting (Surgery Nonspecified)  Goal: Nausea and Vomiting Relief  Outcome: No Change     Problem: Bowel Motility Impaired (Surgery Nonspecified)  Goal: Effective Bowel Elimination  Outcome: Improving     Problem: Postoperative Urinary Retention (Surgery Nonspecified)  Goal: Effective Urinary Elimination  Outcome: Improving     Problem: Alcohol Withdrawal  Goal: Alcohol Withdrawal Symptom Control  Outcome: Improving     -Patient continues intermittent nausea and rates aching abdominal pain up to 10/10 tonight. Used ice packs. Administered PRN Dilaudid IV 0.2mg and 0.4mg, noting some relief and diminished non-verbal signs.   -Continues CIWA, ratings 10, 9, and 4: anxious, agitated, mild tremors, intermittent nausea, and occasional headache. Treated with 10mg Valium PO, noted effective.   -Pleasant and polite. Continues on /hr. Tolerating jello and water.  -Urinary output adequate, straw colored and clear. No BM, but bowel sounds active and pt reports passing gas.     Casey Holcomb RN

## 2022-02-01 NOTE — PROGRESS NOTES
[facility]    PROGRESS NOTE - Hospitalist Service    Assessment and Plan      Evelyn Hudson is a 51 year old old female with a hx relapsing alcohol abuse admitted to hospital for elective surgery with CRS    Chronic alcohol abuse  ---recently in [facility] last month  ---scored 12-14 overnight but states secondary to anxiety ( on meds for that as well) - got valium  --- continue ciwa; states using medication for anxiety  --- continue home Keppra (on for ETOH sz)    Hypertension  --- bp stable overnight  ---bmp ok this am  --- Continue home propranolol    Hyponatremia  ---mild  ---recheck in am  --- Decrease IV fluids as taking liquids well    Hypophos/hypomagnesesmia  ---replace per protocols    PTSD/anxiety  --- Continue home trazodone, mirtazapine, Lexapro, and as needed hydroxyzine    Tobacco abuse--gum ordered.  We will add patch per her request    Colon polyps   ---POD #1 status post Robotic-assisted right hemicolectomy  Transanal excision of rectal polyp  --- On heparin for DVT prophylaxis  --- Pain control and issue.  Colorectal adding Toradol.  --- hgb stable, no anemia  --- Await pathology  --- Colorectal surgery monitoring    COVID STATUS fully vaccinated.  Testing 1/29 negative  VTE prophylaxis:  Heparin subcutaneous and scd  DIET: Orders Placed This Encounter      Clear Liquid Diet    Disposition; home  Barriers to discharge: surgical progression per CRS  Code Status: Full Code        Subjective:  Patient struggling with pain control today  Utilizing IV medications  Urinating fine without Gomez  Unclear if elevated CIWA scores are anxiety.  She does not think she is withdrawing and again denies any significant alcohol use in the week prior to admission  Up walking  Passing gas  Tolerating liquids    PHYSICAL EXAM  B/P: 123/87, T: 97.8, P: 89, R: 14     Intake/Output Summary (Last 24 hours) at 1/31/2022 1454  Last data filed at 1/31/2022 1326  Gross per 24 hour   Intake 2205 ml    Output 140 ml   Net 2065 ml      Vitals:    01/27/22 1400 01/31/22 0543 01/31/22 1400   Weight: 65.1 kg (143 lb 9.6 oz) 61.7 kg (136 lb 1.6 oz) 64.1 kg (141 lb 4.8 oz)       General Appearance: Pleasant, mildly anxious, no apparent distress.  Respiratory: Clear to auscultation bilaterally  Cardiovascular: Regular rate and rhythm without murmurs rubs or gallops  GI: Incisions intact.  Mild diffuse tenderness  Skin: Incisions closed per above.  No open areas or rashes noted.  Musculoskeletal:no significant lower extremity edema.  Neurologic: Again appears mildly anxious but moving around well.  Seen getting in and out of bed..      PERTINENT LABS/IMAGING:    Recent Results (from the past 24 hour(s))   Potassium    Collection Time: 01/31/22  3:21 PM   Result Value Ref Range    Potassium 3.8 3.5 - 5.0 mmol/L   Creatinine    Collection Time: 01/31/22  3:21 PM   Result Value Ref Range    Creatinine 0.75 0.60 - 1.10 mg/dL    GFR Estimate >90 >60 mL/min/1.73m2   Magnesium    Collection Time: 01/31/22  3:21 PM   Result Value Ref Range    Magnesium 2.0 1.8 - 2.6 mg/dL   Phosphorus    Collection Time: 01/31/22  3:21 PM   Result Value Ref Range    Phosphorus 4.0 2.5 - 4.5 mg/dL   Glucose by meter    Collection Time: 02/01/22  5:56 AM   Result Value Ref Range    GLUCOSE BY METER POCT 114 (H) 70 - 99 mg/dL   Basic metabolic panel    Collection Time: 02/01/22  6:10 AM   Result Value Ref Range    Sodium 131 (L) 136 - 145 mmol/L    Potassium 3.5 3.5 - 5.0 mmol/L    Chloride 97 (L) 98 - 107 mmol/L    Carbon Dioxide (CO2) 25 22 - 31 mmol/L    Anion Gap 9 5 - 18 mmol/L    Urea Nitrogen 4 (L) 8 - 22 mg/dL    Creatinine 0.62 0.60 - 1.10 mg/dL    Calcium 8.0 (L) 8.5 - 10.5 mg/dL    Glucose 109 70 - 125 mg/dL    GFR Estimate >90 >60 mL/min/1.73m2   CBC with platelets    Collection Time: 02/01/22  6:10 AM   Result Value Ref Range    WBC Count 9.5 4.0 - 11.0 10e3/uL    RBC Count 3.57 (L) 3.80 - 5.20 10e6/uL    Hemoglobin 12.0 11.7 - 15.7  g/dL    Hematocrit 34.8 (L) 35.0 - 47.0 %    MCV 98 78 - 100 fL    MCH 33.6 (H) 26.5 - 33.0 pg    MCHC 34.5 31.5 - 36.5 g/dL    RDW 14.9 10.0 - 15.0 %    Platelet Count 204 150 - 450 10e3/uL   Magnesium    Collection Time: 02/01/22  6:10 AM   Result Value Ref Range    Magnesium 1.6 (L) 1.8 - 2.6 mg/dL   Phosphorus    Collection Time: 02/01/22  6:10 AM   Result Value Ref Range    Phosphorus 1.5 (L) 2.5 - 4.5 mg/dL        POC US Guidance Needle Placement   Final Result           Maribell Rachel MD  [facility] Medicine Service  [phone]

## 2022-02-01 NOTE — DISCHARGE INSTRUCTIONS
Smoking Cessation  -Upon discharge recommend the patient have the following pharmacotherapy: Patch 21 mg and Gum 4 mg till 4-6 weeks of successful cessation has been achieved and   -Taper NRT recommended after 4-6 weeks of successful cessation.  Patch down to 14 mg for 4 weeks then 7 mg for 4 weeks. For Gum/Lozenge down to at 2 mg for 4 weeks then decrease frequency for 4 weeks..   -You will receive a phone call for follow up counseling about 2 weeks after discharge from the hosptial.  Questions or concerns regarding your smoking cessation plan call Rosalind at [phone].

## 2022-02-01 NOTE — PROGRESS NOTES
Care Management Initial Consult    General Information  Assessment completed with: Patient,    Type of CM/SW Visit: Initial Assessment    Primary Care Provider verified and updated as needed:     Readmission within the last 30 days: no previous admission in last 30 days         Advance Care Planning: Advance Care Planning Reviewed: education/resources on health care directives provided          Communication Assessment  Patient's communication style: spoken language (English or Bilingual)    Hearing Difficulty or Deaf: no   Wear Glasses or Blind: yes    Cognitive  Cognitive/Neuro/Behavioral: WDL                      Living Environment:   People in home: alone     Current living Arrangements: house      Able to return to prior arrangements: yes       Family/Social Support:  Care provided by:    Provides care for: no one     Parent(s),Other (specify)          Description of Support System:  (friends)    Support Assessment: Adequate family and caregiver support,Adequate social supports    Current Resources:   Patient receiving home care services:       Community Resources:    Equipment currently used at home: none  Supplies currently used at home:      Employment/Financial:  Employment Status: employed full-time     Employment/ Comments:  ()  Financial Concerns: No concerns identified           Lifestyle & Psychosocial Needs:  Social Determinants of Health     Tobacco Use: High Risk     Smoking Tobacco Use: Current Every Day Smoker     Smokeless Tobacco Use: Never Used   Alcohol Use: Not on file   Financial Resource Strain: Not on file   Food Insecurity: Not on file   Transportation Needs: Not on file   Physical Activity: Not on file   Stress: Not on file   Social Connections: Not on file   Intimate Partner Violence: Not on file   Depression: Not at risk     PHQ-2 Score: 0   Housing Stability: Not on file       Functional Status:  Prior to admission patient needed assistance:   Dependent ADLs::  Independent  Dependent IADLs:: Independent       Mental Health Status:          Chemical Dependency Status:                Values/Beliefs:  Spiritual, Cultural Beliefs, Zoroastrianism Practices, Values that affect care: no               Additional Information:  Patient assessed. No discharge needs identified. Will continue to monitor for medical progression and discharge needs. Patient requested and was given information regarding HCD.    Eden Becerril RN
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
## 2022-02-01 NOTE — PLAN OF CARE
Problem: Pain (Surgery Nonspecified)  Goal: Acceptable Pain Control  Outcome: No Change  Intervention: Prevent or Manage Pain  Recent Flowsheet Documentation  Taken 1/31/2022 1730 by Marnie Arellano RN  Pain Management Interventions: medication (see MAR)  Taken 1/31/2022 1616 by Marnie Arellano, RN  Pain Management Interventions:   medication (see MAR)   cold applied   Pt rating pain 8-9/10.  Dilaudid and oxycodone given for pain management.  Pt c/o itching hydroxyzine given.   Problem: Postoperative Nausea and Vomiting (Surgery Nonspecified)  Goal: Nausea and Vomiting Relief  Outcome: No Change    Pt c/o nausea/heartburn.  Zofran given.      Problem: Alcohol Withdrawal  Goal: Alcohol Withdrawal Symptom Control  Outcome: No Change    Pt on CIWA protocol.  Ratings on evening shift were 12, 13 and 14 mainly due to anxiety and agitation.  Pt medicated with valium 10 mg x 3.

## 2022-02-01 NOTE — PLAN OF CARE
Problem: Bowel Motility Impaired (Surgery Nonspecified)  Goal: Effective Bowel Elimination  Outcome: No Change   Pt had 2 lose BMs. Ambulated in [address]i [address] independently today.     Problem: Pain (Surgery Nonspecified)  Goal: Acceptable Pain Control  Outcome: No Change  Intervention: Prevent or Manage Pain  Recent Flowsheet Documentation  Taken 2/1/2022 1152 by Juliana Singh RN  Pain Management Interventions: medication (see MAR)    States Prn Dilaudid and tylenol were not helping her with pain management. Received Toradol which improved pain.     Gomez catheter removed this morning, voiding without complications.

## 2022-02-01 NOTE — PROGRESS NOTES
POD#1  Op discussed  Pain control an issue but getting better  Ab soft  Passing gas, no stool yet  Will add Toradol and advance to full liquids  Abner Wong MD

## 2022-02-01 NOTE — PROGRESS NOTES
[facility]  Daily Progress Note    Subjective  Patient reports having persistent pain that makes it difficult for her to sleep. She has tolerated clears, though has some intermittent nausea managed with medications. She feels rumbles, no gas or stool yet. Urine output 3.1 L. Vitals stable, though BP intermittently mildly elevated.     Hgb 12.0  WBC 9.5  Cr 0.62    Objective  Intake/Output last 24 hrs:    Intake/Output Summary (Last 24 hours) at 2/1/2022 0716  Last data filed at 2/1/2022 0325  Gross per 24 hour   Intake 3371 ml   Output 3140 ml   Net 231 ml     Temp:  [97.8  F (36.6  C)-98.8  F (37.1  C)] 98.5  F (36.9  C)  Pulse:  [74-98] 93  Resp:  [11-20] 16  BP: (100-146)/(63-90) 140/85  SpO2:  [94 %-100 %] 94 %    Physical Exam:  General: awake, alert, lying in bed, in no acute distress  Head: normocephalic, atraumatic  Respiratory: non-labored breathing  Abdomen: soft, appropriately tender, non-distended              Incisions: clean, dry and intact. Some surrounding ecchymosis  Skin: No rashes or lesions  Musculoskeletal: moves all four extremities equally  Psychological: alert and oriented, answers questions appropriately    Pertinent Labs  Lab Results: personally reviewed.  Lab Results   Component Value Date     02/01/2022     01/20/2022     01/12/2022    CO2 25 02/01/2022    CO2 23 01/20/2022    CO2 29 01/12/2022    BUN 4 02/01/2022    BUN 5 01/20/2022    BUN 6 01/12/2022     Lab Results   Component Value Date    WBC 9.5 02/01/2022    WBC 4.7 01/12/2022    WBC 7.7 12/22/2021    HGB 12.0 02/01/2022    HGB 15.5 01/31/2022    HGB 14.3 01/12/2022    HCT 34.8 02/01/2022    HCT 41.1 01/12/2022    HCT 39.1 12/22/2021    MCV 98 02/01/2022    MCV 96 01/12/2022    MCV 91 12/22/2021     02/01/2022     01/31/2022     01/12/2022       Assessment/Plan: This is a 51 year old female POD #1 s/p robotic-assisted right hemicolectomy with extracorporeal anastomosis and  transanal excision of rectal polyp.    Hgb 12.0  WBC 9.5  Cr 0.62    - OK for heparin DVT ppx  - Remove meza today  - Continue clears until passing gas  - Multimodal pain control  - Continue CIWA protocol per hospitalist  - OOB/Ambulate  - Encourage IS  - Await return of bowel function  - Pathology pending    Discussed with Dr. Judith Florence PA-C  [facility]  [phone]..............................main

## 2022-02-02 LAB
ANION GAP SERPL CALCULATED.3IONS-SCNC: 6 MMOL/L (ref 5–18)
BUN SERPL-MCNC: 4 MG/DL (ref 8–22)
CALCIUM SERPL-MCNC: 8.7 MG/DL (ref 8.5–10.5)
CHLORIDE BLD-SCNC: 102 MMOL/L (ref 98–107)
CO2 SERPL-SCNC: 31 MMOL/L (ref 22–31)
CREAT SERPL-MCNC: 0.62 MG/DL (ref 0.6–1.1)
GFR SERPL CREATININE-BSD FRML MDRD: >90 ML/MIN/1.73M2
GLUCOSE BLD-MCNC: 87 MG/DL (ref 70–125)
MAGNESIUM SERPL-MCNC: 2.5 MG/DL (ref 1.8–2.6)
PHOSPHATE SERPL-MCNC: 2.3 MG/DL (ref 2.5–4.5)
POTASSIUM BLD-SCNC: 3.9 MMOL/L (ref 3.5–5)
SODIUM SERPL-SCNC: 139 MMOL/L (ref 136–145)

## 2022-02-02 PROCEDURE — 99232 SBSQ HOSP IP/OBS MODERATE 35: CPT | Performed by: FAMILY MEDICINE

## 2022-02-02 PROCEDURE — 84100 ASSAY OF PHOSPHORUS: CPT | Performed by: COLON & RECTAL SURGERY

## 2022-02-02 PROCEDURE — 83735 ASSAY OF MAGNESIUM: CPT | Performed by: INTERNAL MEDICINE

## 2022-02-02 PROCEDURE — 250N000011 HC RX IP 250 OP 636: Performed by: STUDENT IN AN ORGANIZED HEALTH CARE EDUCATION/TRAINING PROGRAM

## 2022-02-02 PROCEDURE — 250N000013 HC RX MED GY IP 250 OP 250 PS 637: Performed by: COLON & RECTAL SURGERY

## 2022-02-02 PROCEDURE — 120N000001 HC R&B MED SURG/OB

## 2022-02-02 PROCEDURE — 36415 COLL VENOUS BLD VENIPUNCTURE: CPT | Performed by: FAMILY MEDICINE

## 2022-02-02 PROCEDURE — 82310 ASSAY OF CALCIUM: CPT | Performed by: FAMILY MEDICINE

## 2022-02-02 PROCEDURE — 250N000013 HC RX MED GY IP 250 OP 250 PS 637: Performed by: FAMILY MEDICINE

## 2022-02-02 PROCEDURE — 250N000013 HC RX MED GY IP 250 OP 250 PS 637: Performed by: STUDENT IN AN ORGANIZED HEALTH CARE EDUCATION/TRAINING PROGRAM

## 2022-02-02 RX ADMIN — POTASSIUM & SODIUM PHOSPHATES POWDER PACK 280-160-250 MG 1 PACKET: 280-160-250 PACK at 09:55

## 2022-02-02 RX ADMIN — OXYCODONE HYDROCHLORIDE 10 MG: 5 TABLET ORAL at 16:18

## 2022-02-02 RX ADMIN — KETOROLAC TROMETHAMINE 30 MG: 30 INJECTION, SOLUTION INTRAMUSCULAR at 06:11

## 2022-02-02 RX ADMIN — Medication 100 MG: at 09:55

## 2022-02-02 RX ADMIN — FOLIC ACID 1 MG: 1 TABLET ORAL at 09:57

## 2022-02-02 RX ADMIN — POTASSIUM & SODIUM PHOSPHATES POWDER PACK 280-160-250 MG 1 PACKET: 280-160-250 PACK at 20:23

## 2022-02-02 RX ADMIN — ACETAMINOPHEN 975 MG: 325 TABLET ORAL at 06:05

## 2022-02-02 RX ADMIN — HEPARIN SODIUM 5000 UNITS: 5000 INJECTION, SOLUTION INTRAVENOUS; SUBCUTANEOUS at 13:01

## 2022-02-02 RX ADMIN — POTASSIUM & SODIUM PHOSPHATES POWDER PACK 280-160-250 MG 1 PACKET: 280-160-250 PACK at 13:10

## 2022-02-02 RX ADMIN — HEPARIN SODIUM 5000 UNITS: 5000 INJECTION, SOLUTION INTRAVENOUS; SUBCUTANEOUS at 21:20

## 2022-02-02 RX ADMIN — ESCITALOPRAM OXALATE 20 MG: 20 TABLET ORAL at 09:57

## 2022-02-02 RX ADMIN — ACETAMINOPHEN 975 MG: 325 TABLET ORAL at 13:01

## 2022-02-02 RX ADMIN — HYDROMORPHONE HYDROCHLORIDE 0.4 MG: 0.2 INJECTION, SOLUTION INTRAMUSCULAR; INTRAVENOUS; SUBCUTANEOUS at 21:26

## 2022-02-02 RX ADMIN — OXYCODONE HYDROCHLORIDE 10 MG: 5 TABLET ORAL at 12:10

## 2022-02-02 RX ADMIN — MIRTAZAPINE 30 MG: 30 TABLET ORAL at 21:20

## 2022-02-02 RX ADMIN — Medication 1 TABLET: at 09:55

## 2022-02-02 RX ADMIN — HYDROXYZINE HYDROCHLORIDE 25 MG: 25 TABLET ORAL at 16:19

## 2022-02-02 RX ADMIN — LEVETIRACETAM 500 MG: 500 TABLET, FILM COATED ORAL at 20:23

## 2022-02-02 RX ADMIN — LEVETIRACETAM 500 MG: 500 TABLET, FILM COATED ORAL at 09:57

## 2022-02-02 RX ADMIN — TRAZODONE HYDROCHLORIDE 50 MG: 50 TABLET ORAL at 21:21

## 2022-02-02 RX ADMIN — HEPARIN SODIUM 5000 UNITS: 5000 INJECTION, SOLUTION INTRAVENOUS; SUBCUTANEOUS at 06:05

## 2022-02-02 RX ADMIN — ACETAMINOPHEN 975 MG: 325 TABLET ORAL at 21:20

## 2022-02-02 RX ADMIN — NICOTINE 1 PATCH: 21 PATCH, EXTENDED RELEASE TRANSDERMAL at 12:08

## 2022-02-02 RX ADMIN — OXYCODONE HYDROCHLORIDE 10 MG: 5 TABLET ORAL at 20:23

## 2022-02-02 RX ADMIN — PROPRANOLOL HYDROCHLORIDE 80 MG: 80 CAPSULE, EXTENDED RELEASE ORAL at 09:56

## 2022-02-02 ASSESSMENT — ACTIVITIES OF DAILY LIVING (ADL)
ADLS_ACUITY_SCORE: 4

## 2022-02-02 NOTE — PLAN OF CARE
Pain controlled with IV Toradol, PO oxy and scheduled tylenol. Patient up IND in room. NAEON.    Problem: Pain (Surgery Nonspecified)  Goal: Acceptable Pain Control  Outcome: Improving     Problem: Postoperative Nausea and Vomiting (Surgery Nonspecified)  Goal: Nausea and Vomiting Relief  Outcome: Improving

## 2022-02-02 NOTE — PROGRESS NOTES
[facility]    PROGRESS NOTE - Hospitalist Service    Assessment and Plan      Evelyn Hudson is a 51 year old old female with a hx relapsing alcohol abuse admitted to hospital for elective surgery with CRS    Chronic alcohol abuse  ---recently in Detox last month  ---no evidence withdrawal, discontinue CIWA  --- continue home Keppra (on for ETOH sz)    Hypertension  --- bp stable   ---bmp ok this am  --- Continue home propranolol    Hyponatremia  ---resolved  ---discontinue IVF    Hypophos/hypomagnesesmia  ---replace per protocols    PTSD/anxiety  --- Continue home trazodone, mirtazapine, Lexapro, and as needed hydroxyzine    Tobacco abuse--gum ordered and patch    Colon polyps   ---POD #2 status post Robotic-assisted right hemicolectomy  Transanal excision of rectal polyp  --- On heparin for DVT prophylaxis  --- Pain control and issue.  Colorectal adding Toradol.  --- hgb stable, no anemia  --- Await pathology  --- Colorectal surgery monitoring    COVID STATUS fully vaccinated.  Testing 1/29 negative  VTE prophylaxis:  Heparin subcutaneous and scd  DIET: Orders Placed This Encounter      Full Liquid Diet    Disposition; home  Barriers to discharge: surgical progression per CRS  Code Status: Full Code        Subjective:    Had two small liquid bm  Pain better controlled  Tolerating her diet  Urinating well with meza out    PHYSICAL EXAM  B/P: 123/87, T: 97.8, P: 89, R: 14     Intake/Output Summary (Last 24 hours) at 1/31/2022 1454  Last data filed at 1/31/2022 1326  Gross per 24 hour   Intake 2205 ml   Output 140 ml   Net 2065 ml      Vitals:    01/27/22 1400 01/31/22 0543 01/31/22 1400   Weight: 65.1 kg (143 lb 9.6 oz) 61.7 kg (136 lb 1.6 oz) 64.1 kg (141 lb 4.8 oz)       General Appearance: Pleasant, mildly anxious, no apparent distress.  Respiratory: Clear to auscultation bilaterally  Cardiovascular: Regular rate and rhythm without murmurs rubs or gallops  GI: no significant  tenderness  Skin: abdominal Incisions closed.  No open areas or rashes noted.  Musculoskeletal:no significant lower extremity edema.  Neurologic: Again appears mildly anxious but moving around well.  Seen getting in and out of bed..      PERTINENT LABS/IMAGING:    Recent Results (from the past 24 hour(s))   Basic metabolic panel    Collection Time: 02/02/22  6:34 AM   Result Value Ref Range    Sodium 139 136 - 145 mmol/L    Potassium 3.9 3.5 - 5.0 mmol/L    Chloride 102 98 - 107 mmol/L    Carbon Dioxide (CO2) 31 22 - 31 mmol/L    Anion Gap 6 5 - 18 mmol/L    Urea Nitrogen 4 (L) 8 - 22 mg/dL    Creatinine 0.62 0.60 - 1.10 mg/dL    Calcium 8.7 8.5 - 10.5 mg/dL    Glucose 87 70 - 125 mg/dL    GFR Estimate >90 >60 mL/min/1.73m2   Magnesium    Collection Time: 02/02/22  6:34 AM   Result Value Ref Range    Magnesium 2.5 1.8 - 2.6 mg/dL        POC US Guidance Needle Placement   Final Result           Maribell Rachel MD  [facility]  [phone]

## 2022-02-02 NOTE — PLAN OF CARE
Problem: Bowel Motility Impaired (Surgery Nonspecified)  Goal: Effective Bowel Elimination  Outcome: Improving     Problem: Pain (Surgery Nonspecified)  Goal: Acceptable Pain Control  Outcome: Improving     Problem: Adult Inpatient Plan of Care  Goal: Optimal Comfort and Wellbeing  Outcome: Improving   Pt has pain at a tolerable level with PO Oxycodone and Tylenol. UP and above walking around the unit independently. BM x2 voiding freely with adequate amount output. Now on low fiber diet and tolerating well. She would like to discharge home soon. Will continue to monitor.     Juliana Stein RN

## 2022-02-02 NOTE — PROGRESS NOTES
[facility]al [facility]  Daily Progress Note    Subjective  Continues to be tired. Reports pain being more controlled with IV Toradol on board. Tolerating fulls without nausea. Continues to pass gas and had 2 loose BMs yesterday. Urinating independently.     Objective  Intake/Output last 24 hrs:    Intake/Output Summary (Last 24 hours) at 2/2/2022 0959  Last data filed at 2/1/2022 2100  Gross per 24 hour   Intake 1265 ml   Output 1150 ml   Net 115 ml     Temp:  [97.8  F (36.6  C)-98.8  F (37.1  C)] 97.8  F (36.6  C)  Pulse:  [84-95] 84  Resp:  [16-19] 18  BP: (123-140)/(64-96) 133/89  SpO2:  [91 %-99 %] 93 %    Physical Exam:  General: awake, alert, laying in gamaliel, in no acute distress  Head: normocephalic, atraumatic  Respiratory: non-labored breathing  Abdomen: soft, appropriately tender, non-distended              Incisions: clean, dry and intact  Skin: No rashes or lesions  Musculoskeletal: moves all four extremities equally; no calf edema or tenderness  Psychological: alert and oriented, answers questions appropriately    Pertinent Labs  Lab Results: personally reviewed.  Lab Results   Component Value Date     02/02/2022     02/01/2022     01/20/2022    CO2 31 02/02/2022    CO2 25 02/01/2022    CO2 23 01/20/2022    BUN 4 02/02/2022    BUN 4 02/01/2022    BUN 5 01/20/2022     Lab Results   Component Value Date    WBC 9.5 02/01/2022    WBC 4.7 01/12/2022    WBC 7.7 12/22/2021    HGB 12.0 02/01/2022    HGB 15.5 01/31/2022    HGB 14.3 01/12/2022    HCT 34.8 02/01/2022    HCT 41.1 01/12/2022    HCT 39.1 12/22/2021    MCV 98 02/01/2022    MCV 96 01/12/2022    MCV 91 12/22/2021     02/01/2022     01/31/2022     01/12/2022       Assessment/Plan: This is a 51 year old female POD #2 s/p robotic-assisted right hemicolectomy with extracorporeal anastomosis and transanal excision of rectal polyp.    Cr 0.62  Na 139  AVSS    -Advance to LFD  -HSQ dvt ppx  -Pain control; discussed that  pain will have to be controlled with PO medications prior to discharge  -CIWA protocol per hospitalist  -OOB/ambulate  -Encourage IS  -Monitor I&Os  -Path pendng    Discussed with PJ LordC  [facility]  [phone]..............................main

## 2022-02-03 VITALS
HEIGHT: 62 IN | RESPIRATION RATE: 18 BRPM | WEIGHT: 141.3 LBS | TEMPERATURE: 99 F | DIASTOLIC BLOOD PRESSURE: 87 MMHG | HEART RATE: 85 BPM | OXYGEN SATURATION: 95 % | BODY MASS INDEX: 26 KG/M2 | SYSTOLIC BLOOD PRESSURE: 139 MMHG

## 2022-02-03 PROBLEM — G89.18 ACUTE POST-OPERATIVE PAIN: Status: ACTIVE | Noted: 2022-02-03

## 2022-02-03 LAB
HOLD SPECIMEN: NORMAL
MAGNESIUM SERPL-MCNC: 1.3 MG/DL (ref 1.8–2.6)
PATH REPORT.COMMENTS IMP SPEC: NORMAL
PATH REPORT.COMMENTS IMP SPEC: NORMAL
PATH REPORT.FINAL DX SPEC: NORMAL
PATH REPORT.GROSS SPEC: NORMAL
PATH REPORT.MICROSCOPIC SPEC OTHER STN: NORMAL
PATH REPORT.RELEVANT HX SPEC: NORMAL
PHOSPHATE SERPL-MCNC: 4.1 MG/DL (ref 2.5–4.5)
PHOTO IMAGE: NORMAL
POTASSIUM BLD-SCNC: 3.6 MMOL/L (ref 3.5–5)

## 2022-02-03 PROCEDURE — 88305 TISSUE EXAM BY PATHOLOGIST: CPT | Mod: 26 | Performed by: PATHOLOGY

## 2022-02-03 PROCEDURE — 250N000011 HC RX IP 250 OP 636: Performed by: STUDENT IN AN ORGANIZED HEALTH CARE EDUCATION/TRAINING PROGRAM

## 2022-02-03 PROCEDURE — 36415 COLL VENOUS BLD VENIPUNCTURE: CPT | Performed by: COLON & RECTAL SURGERY

## 2022-02-03 PROCEDURE — 83735 ASSAY OF MAGNESIUM: CPT | Performed by: COLON & RECTAL SURGERY

## 2022-02-03 PROCEDURE — 250N000013 HC RX MED GY IP 250 OP 250 PS 637: Performed by: FAMILY MEDICINE

## 2022-02-03 PROCEDURE — 99232 SBSQ HOSP IP/OBS MODERATE 35: CPT | Performed by: FAMILY MEDICINE

## 2022-02-03 PROCEDURE — 250N000011 HC RX IP 250 OP 636: Performed by: FAMILY MEDICINE

## 2022-02-03 PROCEDURE — 84132 ASSAY OF SERUM POTASSIUM: CPT | Performed by: COLON & RECTAL SURGERY

## 2022-02-03 PROCEDURE — 250N000013 HC RX MED GY IP 250 OP 250 PS 637: Performed by: STUDENT IN AN ORGANIZED HEALTH CARE EDUCATION/TRAINING PROGRAM

## 2022-02-03 PROCEDURE — 84100 ASSAY OF PHOSPHORUS: CPT | Performed by: COLON & RECTAL SURGERY

## 2022-02-03 PROCEDURE — 88309 TISSUE EXAM BY PATHOLOGIST: CPT | Mod: 26 | Performed by: PATHOLOGY

## 2022-02-03 RX ORDER — MAGNESIUM SULFATE HEPTAHYDRATE 40 MG/ML
2 INJECTION, SOLUTION INTRAVENOUS ONCE
Status: COMPLETED | OUTPATIENT
Start: 2022-02-03 | End: 2022-02-03

## 2022-02-03 RX ORDER — OXYCODONE HYDROCHLORIDE 5 MG/1
5 TABLET ORAL EVERY 6 HOURS PRN
Qty: 20 TABLET | Refills: 0 | Status: SHIPPED | OUTPATIENT
Start: 2022-02-03

## 2022-02-03 RX ORDER — MAGNESIUM OXIDE 400 MG/1
400 TABLET ORAL 2 TIMES DAILY
Status: DISCONTINUED | OUTPATIENT
Start: 2022-02-03 | End: 2022-02-03 | Stop reason: HOSPADM

## 2022-02-03 RX ADMIN — NICOTINE 1 PATCH: 21 PATCH, EXTENDED RELEASE TRANSDERMAL at 09:15

## 2022-02-03 RX ADMIN — ACETAMINOPHEN 975 MG: 325 TABLET ORAL at 06:50

## 2022-02-03 RX ADMIN — PROPRANOLOL HYDROCHLORIDE 80 MG: 80 CAPSULE, EXTENDED RELEASE ORAL at 09:13

## 2022-02-03 RX ADMIN — LEVETIRACETAM 500 MG: 500 TABLET, FILM COATED ORAL at 09:15

## 2022-02-03 RX ADMIN — MAGNESIUM SULFATE HEPTAHYDRATE 2 G: 40 INJECTION, SOLUTION INTRAVENOUS at 01:38

## 2022-02-03 RX ADMIN — FOLIC ACID 1 MG: 1 TABLET ORAL at 09:14

## 2022-02-03 RX ADMIN — OXYCODONE HYDROCHLORIDE 10 MG: 5 TABLET ORAL at 06:50

## 2022-02-03 RX ADMIN — OXYCODONE HYDROCHLORIDE 10 MG: 5 TABLET ORAL at 11:16

## 2022-02-03 RX ADMIN — ESCITALOPRAM OXALATE 20 MG: 20 TABLET ORAL at 09:14

## 2022-02-03 RX ADMIN — Medication 100 MG: at 10:26

## 2022-02-03 RX ADMIN — HEPARIN SODIUM 5000 UNITS: 5000 INJECTION, SOLUTION INTRAVENOUS; SUBCUTANEOUS at 06:50

## 2022-02-03 RX ADMIN — MAGNESIUM OXIDE TAB 400 MG (241.3 MG ELEMENTAL MG) 400 MG: 400 (241.3 MG) TAB at 10:25

## 2022-02-03 RX ADMIN — Medication 1 TABLET: at 09:14

## 2022-02-03 RX ADMIN — OXYCODONE HYDROCHLORIDE 10 MG: 5 TABLET ORAL at 01:35

## 2022-02-03 ASSESSMENT — ACTIVITIES OF DAILY LIVING (ADL)
ADLS_ACUITY_SCORE: 4

## 2022-02-03 NOTE — PLAN OF CARE
Pt discharged home accompanied by a friend.  Discharge instructions were reviewed with the pt prior to leaving.  Pt states she will make follow up appointment with her primary MD.

## 2022-02-03 NOTE — PLAN OF CARE
Problem: Pain (Surgery Nonspecified)  Goal: Acceptable Pain Control  Outcome: No Change  Intervention: Prevent or Manage Pain  Recent Flowsheet Documentation  Taken 2/2/2022 2023 by Britni Webb, RN  Pain Management Interventions: medication (see MAR)  Taken 2/2/2022 1618 by Britni Webb, RN  Pain Management Interventions: medication (see MAR)   Patient had oxycodone at the start of the shift for surgical abdominal pain, was helpful, again had oxycodone for pain 10/10, was crying when writer checked back on her, dilaudid prn was helpful, bp 143/90, other vitals sable, ambulated in the hallways, abdominal surgical sites clean dry and intact, had a loose stool, denies nausea, no emesis.

## 2022-02-03 NOTE — PLAN OF CARE
MIGUELANGEL. Patient continues to have significant pain. Oxy given x2 this shift. Refused Toradol. Encouraged to use ice and to ambulate.     Problem: Pain (Surgery Nonspecified)  Goal: Acceptable Pain Control  Outcome: No Change

## 2022-02-03 NOTE — PROGRESS NOTES
"Colon and Rectal Surgery  Daily Progress Note    Subjective POD #3    Patient continues to report pain. She had a dose of IV Dilaudid yesterday evening and she started having a hard time once it started wearing off. She describes the pain as \"excruciating\" and \"burning\". She understands that the pain control is the last barrier to keep her from discharge and that she should be able to control her pain on PO pain meds. She is willing to see how the morning goes. Patient continues to have loose stools and ambulate independently.    Afebrile, VSS. Per MAR, patient has been taking 10 mg PO oxycodone TID PRN the past 2 days. And 2 doses already this AM.     Objective  Intake/Output last 24 hrs:    Intake/Output Summary (Last 24 hours) at 2/3/2022 0944  Last data filed at 2/2/2022 1800  Gross per 24 hour   Intake 1972 ml   Output --   Net 1972 ml     Temp:  [96.7  F (35.9  C)-99  F (37.2  C)] 99  F (37.2  C)  Pulse:  [82-89] 85  Resp:  [18-19] 18  BP: (139-143)/(87-90) 139/87  SpO2:  [94 %-98 %] 95 %    Physical Exam:  General: awake, alert, sitting up in recliner with legs crossed, in no acute distress  Head: normocephalic, atraumatic  Respiratory: non-labored breathing  Abdomen: soft, appropriately tender, non-distended              Incisions: clean, dry and intact; no bleeding, erythema, or purulent drainage.   Skin: No rashes or lesions  Musculoskeletal: moves all four extremities equally; no calf edema or tenderness  Psychological: alert and oriented, answers questions appropriately    Pertinent Labs  Lab Results: personally reviewed.  Lab Results   Component Value Date     02/02/2022     02/01/2022     01/20/2022    CO2 31 02/02/2022    CO2 25 02/01/2022    CO2 23 01/20/2022    BUN 4 02/02/2022    BUN 4 02/01/2022    BUN 5 01/20/2022     Lab Results   Component Value Date    WBC 9.5 02/01/2022    WBC 4.7 01/12/2022    WBC 7.7 12/22/2021    HGB 12.0 02/01/2022    HGB 15.5 01/31/2022    HGB 14.3 " 01/12/2022    HCT 34.8 02/01/2022    HCT 41.1 01/12/2022    HCT 39.1 12/22/2021    MCV 98 02/01/2022    MCV 96 01/12/2022    MCV 91 12/22/2021     02/01/2022     01/31/2022     01/12/2022       Assessment/Plan: This is a 51 year old female POD #3 s/p robotic-assisted right hemicolectomy with extracorporeal anastomosis and transanal excision of rectal polyp.    -OK for discharge today  -Relayed discharge instructions to patient this AM, will include in her discharge paperwork  -Will send home with PO oxycodone for acute postoperative breakthrough pain   -Patient will follow-up in CRS clinic in 3 weeks   -Path pending, patient likely won't need extended Lovenox. Will address accordingly depending on results.     Discussed with Dr. Judith Hutchinson, PADaysiC  [facility]  [phone]..............................main

## 2022-02-03 NOTE — PROGRESS NOTES
[facility]    PROGRESS NOTE - Hospitalist Service    Assessment and Plan      Evelyn Hudson is a 51 year old old female with a hx relapsing alcohol abuse admitted to hospital for elective surgery with CRS    Chronic alcohol abuse  ---recently in Detox last month  ---no evidence withdrawal this hospital stay  --- continue home Keppra (on for ETOH sz)    Hypertension  --- bp and labs stable  --- Continue home propranolol    Hyponatremia  ---resolved with IVF      Hypophos/hypomagnesesmia  ---replaced per protocols    PTSD/anxiety  --- Continue home trazodone, mirtazapine, Lexapro, and as needed hydroxyzine    Tobacco abuse--gum ordered and patch    Colon polyps   ---POD #3 status post Robotic-assisted right hemicolectomy  Transanal excision of rectal polyp  --- On heparin for DVT prophylaxis  --- Pain control and issue.  Colorectal adding Toradol.  --- hgb stable, no anemia  --- Await pathology  --- Colorectal surgery monitoring    [facility] [place].  Thank you for involving us in patient's hospital  COVID STATUS fully vaccinated.  Testing 1/29 negative  VTE prophylaxis:  Heparin subcutaneous and scd  DIET: Orders Placed This Encounter      Low Fiber Diet    Disposition; home  Barriers to discharge: surgical progression per CRS  Code Status: Full Code        Subjective:    Mild issues with pain control but feels she can handle it at home.  Still stooling with them firming up.  Tolerating oral diet well    PHYSICAL EXAM  B/P: 123/87, T: 97.8, P: 89, R: 14     Intake/Output Summary (Last 24 hours) at 1/31/2022 1454  Last data filed at 1/31/2022 1326  Gross per 24 hour   Intake 2205 ml   Output 140 ml   Net 2065 ml      Vitals:    01/27/22 1400 01/31/22 0543 01/31/22 1400   Weight: 65.1 kg (143 lb 9.6 oz) 61.7 kg (136 lb 1.6 oz) 64.1 kg (141 lb 4.8 oz)       General Appearance: Pleasant, no apparent distress.  Respiratory: Clear to auscultation  bilaterally  Cardiovascular: Regular rate and rhythm without murmurs rubs or gallops  GI: no significant tenderness  Skin: abdominal Incisions closed.  No open areas or rashes noted.  Musculoskeletal:no significant lower extremity edema.  Neurologic: Again appears mildly anxious but moving around well.  Seen getting in and out of bed..      PERTINENT LABS/IMAGING:    Recent Results (from the past 24 hour(s))   Potassium    Collection Time: 02/03/22 12:00 AM   Result Value Ref Range    Potassium 3.6 3.5 - 5.0 mmol/L   Magnesium    Collection Time: 02/03/22 12:00 AM   Result Value Ref Range    Magnesium 1.3 (L) 1.8 - 2.6 mg/dL   Phosphorus    Collection Time: 02/03/22  6:33 AM   Result Value Ref Range    Phosphorus 4.1 2.5 - 4.5 mg/dL        POC US Guidance Needle Placement   Final Result           Maribell Rachel MD  [facility]  [phone]     Discharged

## 2022-02-04 ENCOUNTER — PATIENT OUTREACH (OUTPATIENT)
Dept: CARE COORDINATION | Facility: CLINIC | Age: 52
End: 2022-02-04
Payer: COMMERCIAL

## 2022-02-04 DIAGNOSIS — Z71.89 OTHER SPECIFIED COUNSELING: ICD-10-CM

## 2022-02-04 NOTE — PROGRESS NOTES
Clinic Care Coordination Contact  Care Team Conversations    Patient identified for care management outreach, however [facility] RN staff has already followed up with patient to ensure they are following up with PCP and have needs and resources met. Clinic RN will refer back to MELA JOSE if needed/appropriate.    RENA Roldan  Social Work Care Coordinator - [facility]  Care Coordination  [email or web address]  Cell Phone: [phone]  Gender pronouns: she/her  Employed by [facility] [employer]

## 2022-02-04 NOTE — DISCHARGE SUMMARY
Discharge Summary    Patient name: Evelyn Hudson  YOB: 1970   Age: 51 year old  Medical Record Number: [id number]  Primary Care Physician:  Maribell Betancur       Admission Date: 1/31/2022.  Discharge Date: 2/3/2022  Condition at Discharge: Stable       Principal Diagnosis:  Appendiceal orifice, hepatic flexure, and rectal polyps.    HISTORY OF PRESENT ILLNESS: The patient is a 51-year-old woman with a history of a rectal polyp, which has recurred. She also has a polyp at her appendiceal orifice and a large polyp at her hepatic flexure, which has been judged by the gastroenterologist to be not removable colonoscopically.  She presents for robotic-assisted right hemicolectomy and then a transanal excision of the rectal polyp at the same setting.    PROCEDURES PERFORMED DURING HOSPITALIZATION: Robotic-assisted right hemicolectomy with extracorporeal anastomosis and transanal excision of rectal polyp.    FINAL PATHOLOGY:     Final Diagnosis   A) RIGHT COLON, ROBOTIC ASSISTED HEMICOLECTOMY:  -SESSILE SERRATED ADENOMA (1.7 x 1.0 x 0.6 CM IN GREATEST DIMENSIONS)  -TUBULAR ADENOMA (0.3 CM IN GREATEST DIMENSION)  -BIOPSY SITE CHANGES  -SURGICAL MARGINS: NEGATIVE FOR TUMOR  -TWENTY-NINE LYMPH NODES; NEGATIVE FOR TUMOR (0/29)  -ILEOCECAL VALVE WITH SUBMUCOSAL LIPOMATOUS DISTORTION  -BENIGN APPENDIX WITH PARTIAL FIBROUS OBLITERATION OF TIP  -NO EVIDENCE OF HIGH-GRADE DYSPLASIA OR CARCINOMA    B) RECTAL LESION, POLYPECTOMY:  -TUBULOVILLOUS ADENOMA, COMPLETELY EXCISED  -NO EVIDENCE OF HIGH-GRADE DYSPLASIA OR CARCINOMA       BRIEF HOSPITAL COURSE: This 51 year old female presented for an elective robotic-assisted right hemicolectomy with extracorporeal anastomosis and transanal excision of rectal polyp. She was transferred to the surgical dorman following the procedure. She was placed on CIWA protocol postoperatively for chronic alcohol use.  Diet was advanced with return of bowel function.  Pain medication was  transitioned from IV to oral uneventfully.  At the time of discharge, the patient was voiding freely, tolerating a regular diet, and pain was controlled with oral pain medications.  She was discharged home on POD #3 in stable condition.     PHYSICAL EXAM ON DATE OF DISCHARGE: See PA note dated 2/3/22    Vital Signs in last 24 hours:   Temp:  [96.7  F (35.9  C)-99  F (37.2  C)] 99  F (37.2  C)  Pulse:  [82-89] 85  Resp:  [18-19] 18  BP: (139-143)/(87-90) 139/87  SpO2:  [94 %-98 %] 95 %       COMPLICATIONS IN HOSPITAL: None  IMPORTANT PENDING TEST RESULTS: None    Discharge Medications/Orders:  Hold Naltrexone while on opioid therapy any for 7-10 days following last dose to limit risk of withdrawal symptoms. Discuss with PCP.    FOLLOW UP: She should see Maribell Betancur in one week.  Follow up with Dr. Wong's office in 3-4 weeks.     Total time spent for discharge on date of discharge: 20 minutes, with over half spent in counseling and coordinating care  I saw the patient on the date of discharge    Sayda Florence PA-C  [facility]  [phone]    ADDENDUM:  Length of stay: 3 days  Indicate Y or N for the following:  UTI NO  C diff NO  PNA NO  SSI NO  DVT NO  PE NO  CVA NO  MI NO  Enterocutaneous fistula NO  Peripheral nerve injury NO  Abscess (not adjacent to anastomosis) NO  Leak NO            Death within 30 days NO  Reintubation NO  Reoperation NO

## 2022-02-17 ENCOUNTER — TELEPHONE (OUTPATIENT)
Dept: RESPIRATORY THERAPY | Facility: HOSPITAL | Age: 52
End: 2022-02-17
Payer: COMMERCIAL

## 2022-02-17 NOTE — TELEPHONE ENCOUNTER
Left message with phone number and plan to call towards end of month to check in again.  Rosalind Ewing, RT, TTS, Chronic Pulmonary Disease Specialist

## 2022-02-18 NOTE — ADDENDUM NOTE
Addendum  created 02/18/22 0511 by Arielle Melchor MD    Clinical Note Signed, Diagnosis association updated, Intraprocedure Blocks edited

## 2022-03-02 ENCOUNTER — TELEPHONE (OUTPATIENT)
Dept: RESPIRATORY THERAPY | Facility: HOSPITAL | Age: 52
End: 2022-03-02
Payer: COMMERCIAL

## 2022-03-02 NOTE — TELEPHONE ENCOUNTER
Left message to call back if she has any questions, concerns, or issues regarding smoking cessation, NRT, or triggers, left call back number.  Rosalind Ewing, RT, TTS, Chronic Pulmonary Disease Specialist

## 2022-03-04 ENCOUNTER — LAB REQUISITION (OUTPATIENT)
Dept: LAB | Facility: CLINIC | Age: 52
End: 2022-03-04
Payer: COMMERCIAL

## 2022-03-04 DIAGNOSIS — R05.9 COUGH, UNSPECIFIED: ICD-10-CM

## 2022-03-04 PROCEDURE — U0003 INFECTIOUS AGENT DETECTION BY NUCLEIC ACID (DNA OR RNA); SEVERE ACUTE RESPIRATORY SYNDROME CORONAVIRUS 2 (SARS-COV-2) (CORONAVIRUS DISEASE [COVID-19]), AMPLIFIED PROBE TECHNIQUE, MAKING USE OF HIGH THROUGHPUT TECHNOLOGIES AS DESCRIBED BY CMS-2020-01-R: HCPCS | Mod: ORL | Performed by: STUDENT IN AN ORGANIZED HEALTH CARE EDUCATION/TRAINING PROGRAM

## 2022-03-05 LAB — SARS-COV-2 RNA RESP QL NAA+PROBE: NEGATIVE

## 2022-06-03 ENCOUNTER — APPOINTMENT (OUTPATIENT)
Dept: RADIOLOGY | Facility: CLINIC | Age: 52
End: 2022-06-03
Attending: STUDENT IN AN ORGANIZED HEALTH CARE EDUCATION/TRAINING PROGRAM
Payer: COMMERCIAL

## 2022-06-03 ENCOUNTER — HOSPITAL ENCOUNTER (EMERGENCY)
Facility: CLINIC | Age: 52
Discharge: ANOTHER HOSPITAL | End: 2022-06-03
Attending: STUDENT IN AN ORGANIZED HEALTH CARE EDUCATION/TRAINING PROGRAM | Admitting: STUDENT IN AN ORGANIZED HEALTH CARE EDUCATION/TRAINING PROGRAM
Payer: COMMERCIAL

## 2022-06-03 VITALS
BODY MASS INDEX: 23.9 KG/M2 | RESPIRATION RATE: 18 BRPM | OXYGEN SATURATION: 100 % | HEIGHT: 64 IN | TEMPERATURE: 97.5 F | HEART RATE: 100 BPM | SYSTOLIC BLOOD PRESSURE: 119 MMHG | DIASTOLIC BLOOD PRESSURE: 80 MMHG | WEIGHT: 140 LBS
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
## 2022-06-03 PROCEDURE — 12001 RPR S/N/AX/GEN/TRNK 2.5CM/<: CPT | Mod: 59

## 2022-06-03 PROCEDURE — 73110 X-RAY EXAM OF WRIST: CPT | Mod: LT

## 2022-06-03 PROCEDURE — 73140 X-RAY EXAM OF FINGER(S): CPT | Mod: RT

## 2022-06-03 PROCEDURE — 99285 EMERGENCY DEPT VISIT HI MDM: CPT | Mod: 25

## 2022-06-03 PROCEDURE — 250N000013 HC RX MED GY IP 250 OP 250 PS 637: Performed by: STUDENT IN AN ORGANIZED HEALTH CARE EDUCATION/TRAINING PROGRAM

## 2022-06-03 PROCEDURE — 64400 NJX AA&/STRD TRIGEMINAL NRV: CPT

## 2022-06-03 RX ORDER — HYDROCODONE BITARTRATE AND ACETAMINOPHEN 5; 325 MG/1; MG/1
2 TABLET ORAL ONCE
Status: COMPLETED | OUTPATIENT
Start: 2022-06-03 | End: 2022-06-03

## 2022-06-03 RX ADMIN — HYDROCODONE BITARTRATE AND ACETAMINOPHEN 2 TABLET: 5; 325 TABLET ORAL at 09:51

## 2022-06-03 NOTE — DISCHARGE INSTRUCTIONS
Please go to the [facility] at [address]  This is right across the street from [facility].  It is the same building that the emergency room is located.
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
## 2022-09-25 ENCOUNTER — HEALTH MAINTENANCE LETTER (OUTPATIENT)
Age: 52
End: 2022-09-25

## 2022-10-05 ENCOUNTER — LAB REQUISITION (OUTPATIENT)
Dept: LAB | Facility: CLINIC | Age: 52
End: 2022-10-05

## 2022-10-05 DIAGNOSIS — Z01.419 ENCOUNTER FOR GYNECOLOGICAL EXAMINATION (GENERAL) (ROUTINE) WITHOUT ABNORMAL FINDINGS: ICD-10-CM

## 2022-10-05 PROCEDURE — 87624 HPV HI-RISK TYP POOLED RSLT: CPT | Performed by: NURSE PRACTITIONER

## 2022-10-05 PROCEDURE — G0145 SCR C/V CYTO,THINLAYER,RESCR: HCPCS | Performed by: NURSE PRACTITIONER

## 2022-10-10 LAB
BKR LAB AP GYN ADEQUACY: NORMAL
BKR LAB AP GYN INTERPRETATION: NORMAL
BKR LAB AP HPV REFLEX: NORMAL
BKR LAB AP LMP: NORMAL
BKR LAB AP PREVIOUS ABNORMAL: NORMAL
PATH REPORT.COMMENTS IMP SPEC: NORMAL
PATH REPORT.COMMENTS IMP SPEC: NORMAL
PATH REPORT.RELEVANT HX SPEC: NORMAL

## 2022-10-12 LAB
HUMAN PAPILLOMA VIRUS 16 DNA: NEGATIVE
HUMAN PAPILLOMA VIRUS 18 DNA: NEGATIVE
HUMAN PAPILLOMA VIRUS FINAL DIAGNOSIS: ABNORMAL
HUMAN PAPILLOMA VIRUS OTHER HR: POSITIVE

## 2023-01-30 ENCOUNTER — HEALTH MAINTENANCE LETTER (OUTPATIENT)
Age: 53
End: 2023-01-30

## 2023-01-31 ENCOUNTER — HOSPITAL ENCOUNTER (OUTPATIENT)
Dept: MAMMOGRAPHY | Facility: CLINIC | Age: 53
Discharge: HOME | End: 2023-01-31
Attending: NURSE PRACTITIONER | Admitting: NURSE PRACTITIONER
Payer: COMMERCIAL

## 2023-01-31 DIAGNOSIS — Z12.31 VISIT FOR SCREENING MAMMOGRAM: ICD-10-CM

## 2023-01-31 PROCEDURE — 77067 SCR MAMMO BI INCL CAD: CPT

## 2023-06-29 ENCOUNTER — LAB REQUISITION (OUTPATIENT)
Dept: LAB | Facility: CLINIC | Age: 53
End: 2023-06-29

## 2023-06-29 DIAGNOSIS — I10 ESSENTIAL (PRIMARY) HYPERTENSION: ICD-10-CM

## 2023-06-29 PROCEDURE — 83718 ASSAY OF LIPOPROTEIN: CPT | Performed by: NURSE PRACTITIONER

## 2023-06-29 PROCEDURE — 80053 COMPREHEN METABOLIC PANEL: CPT | Performed by: NURSE PRACTITIONER

## 2023-06-30 LAB
ALBUMIN SERPL BCG-MCNC: 4.7 G/DL (ref 3.5–5.2)
ALP SERPL-CCNC: 80 U/L (ref 35–104)
ALT SERPL W P-5'-P-CCNC: 18 U/L (ref 0–50)
ANION GAP SERPL CALCULATED.3IONS-SCNC: 14 MMOL/L (ref 7–15)
AST SERPL W P-5'-P-CCNC: 21 U/L (ref 0–45)
BILIRUB SERPL-MCNC: 0.2 MG/DL
BUN SERPL-MCNC: 8.9 MG/DL (ref 6–20)
CALCIUM SERPL-MCNC: 9.9 MG/DL (ref 8.6–10)
CHLORIDE SERPL-SCNC: 95 MMOL/L (ref 98–107)
CHOLEST SERPL-MCNC: 220 MG/DL
CREAT SERPL-MCNC: 0.82 MG/DL (ref 0.51–0.95)
DEPRECATED HCO3 PLAS-SCNC: 24 MMOL/L (ref 22–29)
GFR SERPL CREATININE-BSD FRML MDRD: 86 ML/MIN/1.73M2
GLUCOSE SERPL-MCNC: 113 MG/DL (ref 70–99)
HDLC SERPL-MCNC: 66 MG/DL
LDLC SERPL CALC-MCNC: 136 MG/DL
NONHDLC SERPL-MCNC: 154 MG/DL
POTASSIUM SERPL-SCNC: 4 MMOL/L (ref 3.4–5.3)
PROT SERPL-MCNC: 7.1 G/DL (ref 6.4–8.3)
SODIUM SERPL-SCNC: 133 MMOL/L (ref 136–145)
TRIGL SERPL-MCNC: 91 MG/DL

## 2023-10-24 ENCOUNTER — LAB REQUISITION (OUTPATIENT)
Dept: LAB | Facility: CLINIC | Age: 53
End: 2023-10-24

## 2023-10-24 DIAGNOSIS — R88.8 ABNORMAL FINDINGS IN OTHER BODY FLUIDS AND SUBSTANCES: ICD-10-CM

## 2023-10-24 PROCEDURE — 88175 CYTOPATH C/V AUTO FLUID REDO: CPT | Performed by: NURSE PRACTITIONER

## 2023-10-24 PROCEDURE — 87624 HPV HI-RISK TYP POOLED RSLT: CPT | Performed by: NURSE PRACTITIONER

## 2023-10-26 LAB
BKR LAB AP GYN ADEQUACY: NORMAL
BKR LAB AP GYN INTERPRETATION: NORMAL
BKR LAB AP HPV REFLEX: NORMAL
BKR LAB AP LMP: NORMAL
BKR LAB AP PREVIOUS ABNL DX: NORMAL
BKR LAB AP PREVIOUS ABNORMAL: NORMAL
PATH REPORT.COMMENTS IMP SPEC: NORMAL
PATH REPORT.COMMENTS IMP SPEC: NORMAL
PATH REPORT.RELEVANT HX SPEC: NORMAL

## 2023-10-27 LAB
HUMAN PAPILLOMA VIRUS 16 DNA: NEGATIVE
HUMAN PAPILLOMA VIRUS 18 DNA: NEGATIVE
HUMAN PAPILLOMA VIRUS FINAL DIAGNOSIS: NORMAL
HUMAN PAPILLOMA VIRUS OTHER HR: NEGATIVE

## 2023-12-23 ENCOUNTER — HEALTH MAINTENANCE LETTER (OUTPATIENT)
Age: 53
End: 2023-12-23

## 2024-10-06 NOTE — PLAN OF CARE
Problem: Adult Inpatient Plan of Care  Goal: Optimal Comfort and Wellbeing  Outcome: No Change     Behavioral  Pt appeared sleeping comfortably overnight; breathing was even and unlabored.      Medical  Pt continues in alcohol withdrawal; MSSA 4, no valium given this shift; Pt last valium on 1/14/22 @ 1613; pt has received 115 mg of valium since admission.      No new medical concerns noted.    
  Problem: Adult Inpatient Plan of Care  Goal: Optimal Comfort and Wellbeing  Outcome: No Change     Behavioral  Pt appeared sleeping comfortably overnight; breathing was even and unlabored.      Medical  Pt continues in alcohol withdrawal; MSSA 5, no valium given this shift; Pt last valium 1/15/22 @ 840. Pt has required 125 mg of valium since admission.     Pt given PRN acetaminophen for headache.     
  Problem: Adult Inpatient Plan of Care  Goal: Optimal Comfort and Wellbeing  Outcome: No Change     Behavioral  Pt appeared sleeping comfortably overnight; breathing was even and unlabored.      Medical  Pt continues in alcohol withdrawal; MSSA 8 and 8; 10 x 2 of valium given this shift; Pt has required 90 mg of valium since admission.      No new medical concerns noted.    
  Problem: General Rehab Plan of Care  Goal: Therapeutic Recreation/Music Therapy Goal (Art Therapy)  Description: The patient and/or their representative will achieve their patient-specific goals related to the plan of care.  The patient-specific goals include:  Outcome: Improving   Pt attended an hour of Art Therapy group tonight. Art Therapy directive was to create a collage vision board using collage materials and/or mixed media.   Goals of directive: to create a personal self narrative, to identify personal strengths and goals, to assess motivation for change.  Pt was a pleasant participant, focused on collage board for the full duration of group. Pt finished collage and briefly shared with author and group. Pt created images which she said represent interests of hers during the summertime, including gardening, the outdoors and outdoor gatherings with family. Pts mood was calm, pleasant participant.  
  Problem: Substance Withdrawal  Goal: Substance Withdrawal  Description: Signs and symptoms of listed problems will be absent or manageable.  Outcome: No Change     Problem: Suicidal Behavior  Goal: Suicidal Behavior is Absent or Managed  Outcome: Improving    Patient is calm and pleasant, cooperative with assessment, blunted and flat affects.     MSSA: 10 and  15. Valium 10 mg given two times this shift.    Reports anxiety 8/10, depression 8/10. Denies any other mental health symptoms.   Prn hydroxyzine given for anxiety.    Mild nausea. Prn Zofran given.    Patient is medication compliant.    Elevated pulse, HR: 126 and 123. Atenolol prn given for high pulse rate.  Other vital signs stable, denies pain.    Staff will continue to monitor patient closely.     
"  Problem: General Rehab Plan of Care  Goal: Therapeutic Recreation/Music Therapy Goal  Description: The patient and/or their representative will achieve their patient-specific goals related to the plan of care.  The patient-specific goals include:  Outcome: No Change     \"Estela\" attended art therapy group for 1 hour (5 patients total). She reported feeling \"anxious\" today. She participated in the art activity to make a drawing depicting what you want to hold on to and what you need to let go of to support your recovery. She made a drawing about holding on to her family. She shared her drawing with the group and talked about how important her family is to her. Peers made supportive comments. Estela interacted appropriately with peers and was a positive participant.   "
"Problem: Substance Withdrawal  Goal: Substance Withdrawal  Description: Signs and symptoms of listed problems will be absent or manageable.  Outcome: Improving     S: Patient scored less than 8 for greater than 24 hours. She has required no medication for withdrawal for > 24 hours.  B: Patient admitted for alcohol withdrawal and detoxification.  A: Patient stable in the alcohol withdrawal process MSSA scores < 8 for > 24 hours. Pt continues to endorses anxiety 5/10 and was given vistaril.  She reports depression 3/10 but denies any thoughts plan or intent for self harm. She reported a headache, unresolved with Tylenol and obtained order for one time dose of Ibuprofen with relief.  She was given flexeril PRN for back pain \"beds are so uncomfortable.\" Her appetite is good, pulse elevated this am 117, however 109 when rechecked.  Given atenolol and pulse went down to 84.        R: Patient removed from detox status per unit Protocol. Pt is hoping to go to [facility] and would like to stay and confirm with CM on Monday to assure an intake appointment is set up.  Pt also reports that she has surgery scheduled 1/31/2022 for colon polyps removal.                    "
"Problem: Substance Withdrawal  Goal: Substance Withdrawal  Description: Signs and symptoms of listed problems will be absent or manageable.  Outcome: No Change       Pt admitted for alcohol withdrawal, MSSA 8 & 8, requiring valium 10 mg x 2.  She reports poor sleep as she arrived on the NOC shift.  Her appetite is adequate, received imodium X 2 for diarrhea.   She endorses anxiety 7/10 and describes her mood as \"sad\".  She is asking for a Covid booster, will request order from MD tomorrow. She reports that she would not like to go to [facility] ver inpatient treatment as she has a surgery that she needs to complete.    "
"Problem: Substance Withdrawal  Goal: Substance Withdrawal  Description: Signs and symptoms of listed problems will be absent or manageable.  Outcome: No Change       Pt admitted for alcohol withdrawal, MSSA 8 & 8, requiring valium 5 mg x 1 and valium 10 mg X 1 this shift.  She has had a total of 105 mg this admission. She report she slept well, appetite is adequate, received imodium X 1 for diarrhea which resolved. She endorses anxiety 5/10 and describes her mood as \"sad\".  She receive her Covid booster today.  She was tearful at times, but social on the unit and attending groups.                 "
"Pt's MSSA was 7 and 5. She did not receive any Valium this shift.  She  attended and participated in groups She reports feeling anxious but \"better\". She received atarax and flexeril prn this shift. She is hopeful about going to tx.  "
Behavioral Team Discussion: (1/13/2022)    Continued Stay Criteria/Rationale: Patient admitted for alcohol withdrawal and Use Disorder.  Plan: The following services will be provided to the patient; psychiatric assessment, medication management, therapeutic milieu, individual and group support, and skills groups.   Participants: 3A Provider: Dr. Kendrick Lira MD; 3A RN: Arelis Eckert RN; 3A CM's: Maribell Way .  Summary/Recommendation: Providers will assess today for treatment recommendations, discharge planning, and aftercare plans. CM will meet with pt for discharge planning.   Medical/Physical: Medical hx of anxiety, hypokalemia, hypomagnesemia, intracoronary stent, excision back and right arm lipoma  and back spasms.  Precautions:   Behavioral Orders   Procedures     Code 1 - Restrict to Unit     Routine Programming     As clinically indicated     Seizure precautions     Status 15     Every 15 minutes.     Withdrawal precautions     Rationale for change in precautions or plan: N/A  Progress: Initial.    
Number of patients attending the group:  8  Group Length:  1 Hours    Group Therapy     Summary of Group / Topics Discussed:      The  Psychotherapy group goal is to promote insight to positive choice and change. Group processing is within a supportive and safe environment. Patients will process emotions using verbal group and expressive psychotherapy interventions.        Assessment Discussed relationships, boundaries, and traits. Watched the relationship tree by Vyyo video. Discussed the video, the group members shared areas in their lives with which it resonated and the struggle of figuring out what relationships are 'roots' and which are 'leaves or branches'. Used the bullseye diagram to discuss levels of intimacy and boundaries.          Patient Response- Pt was pleasant, attentive during group. Shared some of her struggles. Also shared what she would identify as qualities that would lead someone to engage with her and be a 'root'.  
Problem: Substance Withdrawal  Goal: Substance Withdrawal  Description: Signs and symptoms of listed problems will be absent or manageable.  Outcome: Improving       Pt admitted for alcohol withdrawal, MSSA 8 & 7, requiring valium 10 mg  x 1 this shift.   She report's she slept well, appetite is adequate, no further c/o diarrhea.  She reported generalized body pain and a headache and was given Tylenol without much effect and then received Ibuprofen.  She reports she feels it is related to the beds being uncomfortable.  She was given a soft care mattress.  She endorses anxiety 7/10 and depression 5/10 and was given vistaril x 2.  She is social on the unit and attending groups.                        
Pt  attended a Life Skills group this afternoon that involved sharing reflections according to question prompts. Pleasant and agreeable. Comments were superficial but on topic.      01/15/22 1600   Occupational Therapy   Type of Intervention structured groups   Response Initiates, socially acceptable   Hours 1     
Pt attended the structured Therapeutic Recreation group, participating in a group activity. Pt participated in group discussion and leisure participation as a healthy outlet to gain self-esteem, manage behaviors, improve social skills, and decrease isolation.  Pt remained focused and engaged throughout group activity.  Pt mood was sociable and was appropriate with interactions, contributing to the clues and descriptions throughout the activity. Pt was a full participant for the duration of the group.  
Pt's MSSA was 8 and 6. She received Valium 10 mg ,She reported feeling anxious. She was tearful and was crying when staff was checking in with her.She appears to have minimal insight into her situation She received atarax 25 mg. She attended groups  
Pt's MSSA was 8 and 7. She was gaive Valium 10 mg x1. She reports anxious she was given Atarax 25 mg x2. P- has been 102. She was given Altenol 50 mng and it decreased to 80.  
S:  Pt is a 51 year old female who brought herself to [facility] ED for detox from alcohol.  Her breathalyzer was .276.  She drinks 18 white claws daily.  Utox was positive for benzo's.  Last drink was before coming to the ED.    B:  She has hx of alcohol withdrawal seizures no DT's.   Her last seizure was in August 2021. Medical hx of anxiety, hypokalemia, hypomagnesemia, intracoronary stent, excision back and right arm lipoma  and back spasms.She had stopped drinking for 6 years and started drinking again 2 years ago on and off.  Pt has been to detox, and inpt CD treatment several times.  She currently is interested in CD treatment.  Pt smokes 1 pack if cigarettes daily.    A:  Her MSSA scores in ED were 8-10-11 she received valium 10 mg po X3.  Her last dose was at 0350 just before arriving to .   Her MSSA score at 0420 was a 6.  Pt was given pt bill of rights and orientated to the unit.    R:  Continue to monitor for alcohol withdrawal.  Offer support and reassurance.      
bed rails

## 2024-12-18 ENCOUNTER — TRANSFERRED RECORDS (OUTPATIENT)
Dept: HEALTH INFORMATION MANAGEMENT | Facility: CLINIC | Age: 54
End: 2024-12-18
Payer: COMMERCIAL

## 2024-12-19 ENCOUNTER — TRANSCRIBE ORDERS (OUTPATIENT)
Dept: OTHER | Age: 54
End: 2024-12-19

## 2024-12-19 ENCOUNTER — MEDICAL CORRESPONDENCE (OUTPATIENT)
Dept: HEALTH INFORMATION MANAGEMENT | Facility: CLINIC | Age: 54
End: 2024-12-19
Payer: COMMERCIAL

## 2024-12-19 DIAGNOSIS — K43.2 INCISIONAL HERNIA: Primary | ICD-10-CM

## 2024-12-26 ENCOUNTER — OFFICE VISIT (OUTPATIENT)
Dept: SURGERY | Facility: CLINIC | Age: 54
End: 2024-12-26
Attending: STUDENT IN AN ORGANIZED HEALTH CARE EDUCATION/TRAINING PROGRAM
Payer: COMMERCIAL

## 2024-12-26 VITALS — WEIGHT: 129 LBS | BODY MASS INDEX: 22.14 KG/M2

## 2024-12-26 DIAGNOSIS — K43.2 INCISIONAL HERNIA, WITHOUT OBSTRUCTION OR GANGRENE: Primary | ICD-10-CM

## 2024-12-26 RX ORDER — ACETAMINOPHEN 325 MG/1
975 TABLET ORAL ONCE
OUTPATIENT
Start: 2024-12-26 | End: 2024-12-26

## 2024-12-26 RX ORDER — GABAPENTIN 300 MG/1
300 CAPSULE ORAL 2 TIMES DAILY
COMMUNITY

## 2024-12-26 RX ORDER — BUPROPION HYDROCHLORIDE 300 MG/1
300 TABLET ORAL EVERY MORNING
COMMUNITY

## 2024-12-26 NOTE — PROGRESS NOTES
HPI:  Evelyn Hudson is a 53 year old female who was referred to me by Maribell Betancur for evaluation of an incisional hernia.  She presents with complaints of a bulge in the epigastric region at the top of her previous midline scar.   she has been aware of this for the past few months, exacerbated recently by a sinus infection with frequent coughing.  She denies any nausea, vomiting, fevers, chills, bloody bowel movements or any other complaints at this time.  She specifically denies any obstipation, bloating, or development of a hard mass at this site that fails to reduce.       Allergies:Ciprofloxacin    Past Medical History:   Diagnosis Date    Acne     Adenoma of colon     Alcohol withdrawal seizure (H)     Alcoholism /alcohol abuse     Allergic rhinitis     Anxiety     Anxiety     Chronic pain     Hypertension     Hypokalemia     Hypomagnesemia     MTHFR gene mutation     Seasonal allergies        Past Surgical History:   Procedure Laterality Date    COLECTOMY, RIGHT, ROBOT-ASSISTED, LAPAROSCOPIC, USING DA MYRON XI Right 1/31/2022    Procedure: ROBOTIC COLECTOMY RIGHT,;  Surgeon: Abner Wong MD;  Location: [facility] OR    COLON SURGERY      ENDOMETRIAL ABLATION      EXCISE POLYP RECTUM N/A 1/31/2022    Procedure: TRANSANAL EXCISION OF RECTAL POLYP;  Surgeon: Abner Wong MD;  Location: [facility] OR    INSERT INTRACORONARY STENT N/A 5/8/2017    Procedure: EXCISION BACK AND RIGHT ARM LIPOMA;  Surgeon: Rickey Anne MD;  Location: [facility] OR;  Service:     IR INTERCOSTAL STEROID INJECTION SINGLE LEVEL  4/30/2020    IR INTERCOSTAL STEROID INJECTION SINGLE LEVEL  4/30/2020    RECTAL POLYPECTOMY      RECTAL PROLAPSE REPAIR  2014    SURGICAL PATHOLOGY EXAM      TUBAL LIGATION Bilateral 6/3/2014    Procedure: BILATERAL LAPAROSCOPIC TUBAL LIGATION ;  Surgeon: Lorena Jiménez MD;  Location: [facility] OR;  Service:     WISDOM TOOTH EXTRACTION         CURRENT MEDS:  Current  Outpatient Medications   Medication Sig Dispense Refill    amoxicillin (AMOXIL) 500 MG capsule Take 500 mg by mouth 2 times daily       Biotin 5000 MCG TABS Take 5,000 mcg by mouth daily       buPROPion (WELLBUTRIN XL) 300 MG 24 hr tablet Take 300 mg by mouth every morning.      calcium-vitamin D-vitamin K (VIACTIV) 500-500-40 MG-UNT-MCG CHEW Take 1 tablet by mouth 2 times daily      carboxymethylcellulose (REFRESH LIQUIGEL) 1 % ophthalmic solution [CARBOXYMETHYLCELLULOSE (REFRESH LIQUIGEL) 1 % OPHTHALMIC SOLUTION] Apply 1 drop to eye 2 (two) times a day as needed.      cholecalciferol (VITAMIN D3) 125 mcg (5000 units) capsule Take 125 mcg by mouth daily       Cyanocobalamin (B-12) 1000 MCG TBCR Take 1,000 mcg by mouth daily      cyclobenzaprine (FLEXERIL) 10 MG tablet Take 1 tablet (10 mg) by mouth 3 times daily as needed for muscle spasms 20 tablet 0    escitalopram (LEXAPRO) 20 MG tablet Take 1 tablet (20 mg) by mouth daily 30 tablet 0    fluticasone (FLONASE) 50 MCG/ACT nasal spray Spray 1 spray into both nostrils daily as needed for rhinitis or allergies      folic acid (FOLVITE) 1 MG tablet Take 1 tablet (1 mg) by mouth daily 30 tablet 0    gabapentin (NEURONTIN) 300 MG capsule Take 300 mg by mouth 2 times daily.      glucosamine-chondroitin 500-400 mg cap Take 1 capsule by mouth daily       hydrOXYzine (ATARAX) 25 MG tablet Take 1 tablet (25 mg) by mouth 3 times daily as needed for anxiety 60 tablet 0    lactobacillus rhamnosus, GG, (CULTURELL) capsule Take 1 capsule by mouth every evening       levETIRAcetam (KEPPRA) 500 MG tablet Take 1 tablet (500 mg) by mouth 2 times daily 60 tablet 0    loratadine-pseudoePHEDrine (WAL-ITIN D 24 HOUR)  MG 24 hr tablet Take 1 tablet by mouth daily as needed for allergies or congestion       multiple vitamin  tablet TABS Take 1 tablet by mouth daily      nicotine polacrilex (NICORETTE) 4 MG gum Place 4 mg inside cheek every hour as needed for smoking cessation        nystatin (MYCOSTATIN) 102666 UNIT/GM external cream Apply topically daily as needed       Omega-3 Fatty Acids (FISH OIL) 1200 MG capsule Take 1,200 mg by mouth daily      propranolol ER (INDERAL LA) 80 MG 24 hr capsule Take 80 mg by mouth daily      thiamine (B-1) 100 MG tablet Take 1 tablet (100 mg) by mouth daily 30 tablet 0    traZODone (DESYREL) 50 MG tablet Take 1-2 tablets ( mg) by mouth At Bedtime 60 tablet 0    triamcinolone (ARISTOCORT HP) 0.5 % external cream Apply topically 2 times daily as needed       vitamin B6 (PYRIDOXINE) 100 MG tablet Take 100 mg by mouth daily      vitamin C (ASCORBIC ACID) 1000 MG TABS Take 1,000 mg by mouth daily      vitamin E (TOCOPHEROL) 400 units (180 mg) capsule Take 400 Units by mouth daily          Family History   Problem Relation Age of Onset    Chronic Obstructive Pulmonary Disease Father         reports that she has been smoking cigarettes. She started smoking about 37 years ago. She has a 37 pack-year smoking history. She has never used smokeless tobacco. She reports that she does not currently use alcohol after a past usage of about 18.0 standard drinks of alcohol per week. She reports that she does not use drugs.    Review of Systems -   The 10 point review of systems  is within normal limits except for as mentioned above in the HPI.  General ROS: No complaints or constitutional symptoms  Skin: No complaints or symptoms   Hematologic/Lymphatic: No symptoms or complaints  Psychiatric: No symptoms or complaints  Endocrine: No excessive fatigue, no hypermetabolic symptoms reported  Respiratory ROS: no cough, shortness of breath, or wheezing  Cardiovascular ROS: no chest pain or dyspnea on exertion  Gastrointestinal ROS: As per HPI  Musculoskeletal ROS: no recent injuries reported  Neurological ROS: no focal neurologic defects reported.        Wt 58.5 kg (129 lb)   BMI 22.14 kg/m    Body mass index is 22.14 kg/m .    EXAM:  General : Alert, cooperative, appears  stated age   Skin: Skin color, texture, turgor normal, no rashes or lesions   Lymphatic: No obvious adenopathy, no swelling   Eyes: No scleral icterus, pupils equal  HENT: no traumatic injury to the head or face, no gross abnormalities  Lungs: Normal respiratory effort, breath sounds equal bilaterally  Heart: Regular rate and rhythm  Abdomen: Fat-containing incisional hernia in the mid upper abdomen  Musculoskeletal: No obvious swelling,  Neurologic: Grossly intact        Assessment/Plan:    Evelyn Hudson is a 53 year old female with an incisional hernia.  The pathophysiology of incisional hernias was discussed as were the surgical and non-operative management strategies.      We discussed both open and laparoscopic approaches, and the respective risks and benefits of each approach.  We similarly discussed the role and specific risks associated with mesh placement and primary repair.  The risks of surgery in general were discussed which include, but are not limited to, bleeding, infection, recurrent hernia, chronic pain.  Additionally, the risks of observation were discussed which include, but are not limited to, enlargement of the hernia, incarceration, strangulation, pain.      She understands everything which was discussed and has consented to proceed with a open incisional hernia repair at the time of her convenience.  We will therefore schedule surgery accordingly.      Kasi Rice MD, FACS  Office: [phone]  [facility]   General and Bariatric Surgery

## 2024-12-26 NOTE — LETTER
12/26/2024      Evelyn Hudson  [address]  [place] [place] [address]      Dear Colleague,    Thank you for referring your patient, Evelyn Hudson, to the [facility] [place]. Please see a copy of my visit note below.    HPI:  Evelyn Hudson is a 53 year old female who was referred to me by Maribell Betancur for evaluation of an incisional hernia.  She presents with complaints of a bulge in the epigastric region at the top of her previous midline scar.   she has been aware of this for the past few months, exacerbated recently by a sinus infection with frequent coughing.  She denies any nausea, vomiting, fevers, chills, bloody bowel movements or any other complaints at this time.  She specifically denies any obstipation, bloating, or development of a hard mass at this site that fails to reduce.       Allergies:Ciprofloxacin    Past Medical History:   Diagnosis Date     Acne      Adenoma of colon      Alcohol withdrawal seizure (H)      Alcoholism /alcohol abuse      Allergic rhinitis      Anxiety      Anxiety      Chronic pain      Hypertension      Hypokalemia      Hypomagnesemia      MTHFR gene mutation      Seasonal allergies        Past Surgical History:   Procedure Laterality Date     COLECTOMY, RIGHT, ROBOT-ASSISTED, LAPAROSCOPIC, USING DA MYRON XI Right 1/31/2022    Procedure: ROBOTIC COLECTOMY RIGHT,;  Surgeon: Abner Wong MD;  Location: [facility]     COLON SURGERY       ENDOMETRIAL ABLATION       EXCISE POLYP RECTUM N/A 1/31/2022    Procedure: TRANSANAL EXCISION OF RECTAL POLYP;  Surgeon: Abner Wong MD;  Location: [facility]     INSERT INTRACORONARY STENT N/A 5/8/2017    Procedure: EXCISION BACK AND RIGHT ARM LIPOMA;  Surgeon: Rickey Anne MD;  Location: [facility];  Service:      IR INTERCOSTAL STEROID INJECTION SINGLE LEVEL  4/30/2020     IR INTERCOSTAL STEROID INJECTION SINGLE LEVEL  4/30/2020     RECTAL POLYPECTOMY        RECTAL PROLAPSE REPAIR  2014     SURGICAL PATHOLOGY EXAM       TUBAL LIGATION Bilateral 6/3/2014    Procedure: BILATERAL LAPAROSCOPIC TUBAL LIGATION ;  Surgeon: Lorena Jiménez MD;  Location: [facility] OR;  Service:      WISDOM TOOTH EXTRACTION         CURRENT MEDS:  Current Outpatient Medications   Medication Sig Dispense Refill     amoxicillin (AMOXIL) 500 MG capsule Take 500 mg by mouth 2 times daily        Biotin 5000 MCG TABS Take 5,000 mcg by mouth daily        buPROPion (WELLBUTRIN XL) 300 MG 24 hr tablet Take 300 mg by mouth every morning.       calcium-vitamin D-vitamin K (VIACTIV) 500-500-40 MG-UNT-MCG CHEW Take 1 tablet by mouth 2 times daily       carboxymethylcellulose (REFRESH LIQUIGEL) 1 % ophthalmic solution [CARBOXYMETHYLCELLULOSE (REFRESH LIQUIGEL) 1 % OPHTHALMIC SOLUTION] Apply 1 drop to eye 2 (two) times a day as needed.       cholecalciferol (VITAMIN D3) 125 mcg (5000 units) capsule Take 125 mcg by mouth daily        Cyanocobalamin (B-12) 1000 MCG TBCR Take 1,000 mcg by mouth daily       cyclobenzaprine (FLEXERIL) 10 MG tablet Take 1 tablet (10 mg) by mouth 3 times daily as needed for muscle spasms 20 tablet 0     escitalopram (LEXAPRO) 20 MG tablet Take 1 tablet (20 mg) by mouth daily 30 tablet 0     fluticasone (FLONASE) 50 MCG/ACT nasal spray Spray 1 spray into both nostrils daily as needed for rhinitis or allergies       folic acid (FOLVITE) 1 MG tablet Take 1 tablet (1 mg) by mouth daily 30 tablet 0     gabapentin (NEURONTIN) 300 MG capsule Take 300 mg by mouth 2 times daily.       glucosamine-chondroitin 500-400 mg cap Take 1 capsule by mouth daily        hydrOXYzine (ATARAX) 25 MG tablet Take 1 tablet (25 mg) by mouth 3 times daily as needed for anxiety 60 tablet 0     lactobacillus rhamnosus, GG, (CULTURELL) capsule Take 1 capsule by mouth every evening        levETIRAcetam (KEPPRA) 500 MG tablet Take 1 tablet (500 mg) by mouth 2 times daily 60 tablet 0      loratadine-pseudoePHEDrine (WAL-ITIN D 24 HOUR)  MG 24 hr tablet Take 1 tablet by mouth daily as needed for allergies or congestion        multiple vitamin  tablet TABS Take 1 tablet by mouth daily       nicotine polacrilex (NICORETTE) 4 MG gum Place 4 mg inside cheek every hour as needed for smoking cessation        nystatin (MYCOSTATIN) 911291 UNIT/GM external cream Apply topically daily as needed        Omega-3 Fatty Acids (FISH OIL) 1200 MG capsule Take 1,200 mg by mouth daily       propranolol ER (INDERAL LA) 80 MG 24 hr capsule Take 80 mg by mouth daily       thiamine (B-1) 100 MG tablet Take 1 tablet (100 mg) by mouth daily 30 tablet 0     traZODone (DESYREL) 50 MG tablet Take 1-2 tablets ( mg) by mouth At Bedtime 60 tablet 0     triamcinolone (ARISTOCORT HP) 0.5 % external cream Apply topically 2 times daily as needed        vitamin B6 (PYRIDOXINE) 100 MG tablet Take 100 mg by mouth daily       vitamin C (ASCORBIC ACID) 1000 MG TABS Take 1,000 mg by mouth daily       vitamin E (TOCOPHEROL) 400 units (180 mg) capsule Take 400 Units by mouth daily          Family History   Problem Relation Age of Onset     Chronic Obstructive Pulmonary Disease Father         reports that she has been smoking cigarettes. She started smoking about 37 years ago. She has a 37 pack-year smoking history. She has never used smokeless tobacco. She reports that she does not currently use alcohol after a past usage of about 18.0 standard drinks of alcohol per week. She reports that she does not use drugs.    Review of Systems -   The 10 point review of systems  is within normal limits except for as mentioned above in the HPI.  General ROS: No complaints or constitutional symptoms  Skin: No complaints or symptoms   Hematologic/Lymphatic: No symptoms or complaints  Psychiatric: No symptoms or complaints  Endocrine: No excessive fatigue, no hypermetabolic symptoms reported  Respiratory ROS: no cough, shortness of breath, or  wheezing  Cardiovascular ROS: no chest pain or dyspnea on exertion  Gastrointestinal ROS: As per HPI  Musculoskeletal ROS: no recent injuries reported  Neurological ROS: no focal neurologic defects reported.        Wt 58.5 kg (129 lb)   BMI 22.14 kg/m    Body mass index is 22.14 kg/m .    EXAM:  General : Alert, cooperative, appears stated age   Skin: Skin color, texture, turgor normal, no rashes or lesions   Lymphatic: No obvious adenopathy, no swelling   Eyes: No scleral icterus, pupils equal  HENT: no traumatic injury to the head or face, no gross abnormalities  Lungs: Normal respiratory effort, breath sounds equal bilaterally  Heart: Regular rate and rhythm  Abdomen: Fat-containing incisional hernia in the mid upper abdomen  Musculoskeletal: No obvious swelling,  Neurologic: Grossly intact        Assessment/Plan:    Evelyn Hudson is a 53 year old female with an incisional hernia.  The pathophysiology of incisional hernias was discussed as were the surgical and non-operative management strategies.      We discussed both open and laparoscopic approaches, and the respective risks and benefits of each approach.  We similarly discussed the role and specific risks associated with mesh placement and primary repair.  The risks of surgery in general were discussed which include, but are not limited to, bleeding, infection, recurrent hernia, chronic pain.  Additionally, the risks of observation were discussed which include, but are not limited to, enlargement of the hernia, incarceration, strangulation, pain.      She understands everything which was discussed and has consented to proceed with a open incisional hernia repair at the time of her convenience.  We will therefore schedule surgery accordingly.      Kasi Rice MD, FACS  Office: [phone]  [facility]   General and Bariatric Surgery      Again, thank you for allowing me to participate in the care of your patient.         Sincerely,        Kasi Rice MD    Electronically signed

## 2024-12-30 ENCOUNTER — TRANSFERRED RECORDS (OUTPATIENT)
Dept: HEALTH INFORMATION MANAGEMENT | Facility: CLINIC | Age: 54
End: 2024-12-30
Payer: COMMERCIAL

## 2024-12-30 ENCOUNTER — LAB REQUISITION (OUTPATIENT)
Dept: LAB | Facility: CLINIC | Age: 54
End: 2024-12-30

## 2024-12-30 ENCOUNTER — TRANSCRIBE ORDERS (OUTPATIENT)
Dept: OTHER | Age: 54
End: 2024-12-30

## 2024-12-30 ENCOUNTER — MEDICAL CORRESPONDENCE (OUTPATIENT)
Dept: HEALTH INFORMATION MANAGEMENT | Facility: CLINIC | Age: 54
End: 2024-12-30
Payer: COMMERCIAL

## 2024-12-30 DIAGNOSIS — R05.2 SUBACUTE COUGH: Primary | ICD-10-CM

## 2024-12-30 DIAGNOSIS — R05.2 SUBACUTE COUGH: ICD-10-CM

## 2024-12-30 PROCEDURE — 87798 DETECT AGENT NOS DNA AMP: CPT

## 2024-12-31 LAB
B PARAPERT DNA SPEC QL NAA+PROBE: NOT DETECTED
B PERT DNA SPEC QL NAA+PROBE: NOT DETECTED

## 2025-01-09 DIAGNOSIS — R05.9 COUGH: Primary | ICD-10-CM

## 2025-01-18 ENCOUNTER — HEALTH MAINTENANCE LETTER (OUTPATIENT)
Age: 55
End: 2025-01-18

## 2025-01-28 ENCOUNTER — APPOINTMENT (OUTPATIENT)
Dept: RADIOLOGY | Facility: CLINIC | Age: 55
End: 2025-01-28
Attending: EMERGENCY MEDICINE
Payer: COMMERCIAL

## 2025-01-28 ENCOUNTER — HOSPITAL ENCOUNTER (EMERGENCY)
Facility: CLINIC | Age: 55
Discharge: HOME | End: 2025-01-28
Attending: EMERGENCY MEDICINE | Admitting: EMERGENCY MEDICINE
Payer: COMMERCIAL

## 2025-01-28 VITALS
DIASTOLIC BLOOD PRESSURE: 93 MMHG | TEMPERATURE: 97.5 F | RESPIRATION RATE: 16 BRPM | WEIGHT: 132 LBS | HEIGHT: 64 IN | OXYGEN SATURATION: 100 % | BODY MASS INDEX: 22.53 KG/M2 | HEART RATE: 86 BPM | SYSTOLIC BLOOD PRESSURE: 165 MMHG

## 2025-01-28 DIAGNOSIS — R10.9 RIGHT FLANK PAIN: ICD-10-CM

## 2025-01-28 LAB
ALBUMIN UR-MCNC: NEGATIVE MG/DL
ANION GAP SERPL CALCULATED.3IONS-SCNC: 11 MMOL/L (ref 7–15)
APPEARANCE UR: CLEAR
BASOPHILS # BLD AUTO: 0.1 10E3/UL (ref 0–0.2)
BASOPHILS NFR BLD AUTO: 1 %
BILIRUB UR QL STRIP: NEGATIVE
BUN SERPL-MCNC: 6 MG/DL (ref 6–20)
CALCIUM SERPL-MCNC: 10 MG/DL (ref 8.8–10.4)
CHLORIDE SERPL-SCNC: 97 MMOL/L (ref 98–107)
COLOR UR AUTO: ABNORMAL
CREAT SERPL-MCNC: 0.67 MG/DL (ref 0.51–0.95)
D DIMER PPP FEU-MCNC: 0.34 UG/ML FEU (ref 0–0.5)
EGFRCR SERPLBLD CKD-EPI 2021: >90 ML/MIN/1.73M2
EOSINOPHIL # BLD AUTO: 0.4 10E3/UL (ref 0–0.7)
EOSINOPHIL NFR BLD AUTO: 4 %
ERYTHROCYTE [DISTWIDTH] IN BLOOD BY AUTOMATED COUNT: 11.9 % (ref 10–15)
GLUCOSE SERPL-MCNC: 103 MG/DL (ref 70–99)
GLUCOSE UR STRIP-MCNC: NEGATIVE MG/DL
HCO3 SERPL-SCNC: 23 MMOL/L (ref 22–29)
HCT VFR BLD AUTO: 39.1 % (ref 35–47)
HGB BLD-MCNC: 13.7 G/DL (ref 11.7–15.7)
HGB UR QL STRIP: NEGATIVE
IMM GRANULOCYTES # BLD: 0 10E3/UL
IMM GRANULOCYTES NFR BLD: 0 %
KETONES UR STRIP-MCNC: NEGATIVE MG/DL
LEUKOCYTE ESTERASE UR QL STRIP: ABNORMAL
LYMPHOCYTES # BLD AUTO: 1.9 10E3/UL (ref 0.8–5.3)
LYMPHOCYTES NFR BLD AUTO: 18 %
MCH RBC QN AUTO: 33.9 PG (ref 26.5–33)
MCHC RBC AUTO-ENTMCNC: 35 G/DL (ref 31.5–36.5)
MCV RBC AUTO: 97 FL (ref 78–100)
MONOCYTES # BLD AUTO: 0.7 10E3/UL (ref 0–1.3)
MONOCYTES NFR BLD AUTO: 7 %
NEUTROPHILS # BLD AUTO: 7.2 10E3/UL (ref 1.6–8.3)
NEUTROPHILS NFR BLD AUTO: 70 %
NITRATE UR QL: NEGATIVE
NRBC # BLD AUTO: 0 10E3/UL
NRBC BLD AUTO-RTO: 0 /100
PH UR STRIP: 7 [PH] (ref 5–7)
PLATELET # BLD AUTO: 438 10E3/UL (ref 150–450)
POTASSIUM SERPL-SCNC: 4 MMOL/L (ref 3.4–5.3)
RBC # BLD AUTO: 4.04 10E6/UL (ref 3.8–5.2)
RBC URINE: <1 /HPF
SODIUM SERPL-SCNC: 131 MMOL/L (ref 135–145)
SP GR UR STRIP: 1 (ref 1–1.03)
SQUAMOUS EPITHELIAL: 1 /HPF
UROBILINOGEN UR STRIP-MCNC: <2 MG/DL
WBC # BLD AUTO: 10.3 10E3/UL (ref 4–11)
WBC URINE: 4 /HPF

## 2025-01-28 PROCEDURE — 71101 X-RAY EXAM UNILAT RIBS/CHEST: CPT | Mod: RT

## 2025-01-28 PROCEDURE — 36415 COLL VENOUS BLD VENIPUNCTURE: CPT | Performed by: EMERGENCY MEDICINE

## 2025-01-28 PROCEDURE — 85379 FIBRIN DEGRADATION QUANT: CPT | Performed by: EMERGENCY MEDICINE

## 2025-01-28 PROCEDURE — 96374 THER/PROPH/DIAG INJ IV PUSH: CPT | Performed by: EMERGENCY MEDICINE

## 2025-01-28 PROCEDURE — 81001 URINALYSIS AUTO W/SCOPE: CPT | Performed by: EMERGENCY MEDICINE

## 2025-01-28 PROCEDURE — 82310 ASSAY OF CALCIUM: CPT | Performed by: EMERGENCY MEDICINE

## 2025-01-28 PROCEDURE — 96372 THER/PROPH/DIAG INJ SC/IM: CPT | Performed by: EMERGENCY MEDICINE

## 2025-01-28 PROCEDURE — 80048 BASIC METABOLIC PNL TOTAL CA: CPT | Performed by: EMERGENCY MEDICINE

## 2025-01-28 PROCEDURE — 85014 HEMATOCRIT: CPT | Performed by: EMERGENCY MEDICINE

## 2025-01-28 PROCEDURE — 99284 EMERGENCY DEPT VISIT MOD MDM: CPT | Mod: 25 | Performed by: EMERGENCY MEDICINE

## 2025-01-28 PROCEDURE — 85004 AUTOMATED DIFF WBC COUNT: CPT | Performed by: EMERGENCY MEDICINE

## 2025-01-28 PROCEDURE — 250N000013 HC RX MED GY IP 250 OP 250 PS 637: Performed by: EMERGENCY MEDICINE

## 2025-01-28 PROCEDURE — 73030 X-RAY EXAM OF SHOULDER: CPT | Mod: RT

## 2025-01-28 PROCEDURE — 250N000011 HC RX IP 250 OP 636: Performed by: EMERGENCY MEDICINE

## 2025-01-28 RX ORDER — MORPHINE SULFATE 4 MG/ML
4 INJECTION, SOLUTION INTRAMUSCULAR; INTRAVENOUS ONCE
Status: COMPLETED | OUTPATIENT
Start: 2025-01-28 | End: 2025-01-28

## 2025-01-28 RX ORDER — KETOROLAC TROMETHAMINE 30 MG/ML
30 INJECTION, SOLUTION INTRAMUSCULAR; INTRAVENOUS ONCE
Status: COMPLETED | OUTPATIENT
Start: 2025-01-28 | End: 2025-01-28

## 2025-01-28 RX ORDER — LIDOCAINE 4 G/G
1 PATCH TOPICAL ONCE
Status: DISCONTINUED | OUTPATIENT
Start: 2025-01-28 | End: 2025-01-28 | Stop reason: HOSPADM

## 2025-01-28 RX ORDER — HYDROCODONE BITARTRATE AND ACETAMINOPHEN 5; 325 MG/1; MG/1
1 TABLET ORAL EVERY 6 HOURS PRN
Qty: 5 TABLET | Refills: 0 | Status: SHIPPED | OUTPATIENT
Start: 2025-01-28 | End: 2025-01-31

## 2025-01-28 RX ORDER — METHOCARBAMOL 500 MG/1
500 TABLET, FILM COATED ORAL 4 TIMES DAILY PRN
Qty: 30 TABLET | Refills: 0 | Status: SHIPPED | OUTPATIENT
Start: 2025-01-28

## 2025-01-28 RX ORDER — DIAZEPAM 5 MG/1
5 TABLET ORAL ONCE
Status: COMPLETED | OUTPATIENT
Start: 2025-01-28 | End: 2025-01-28

## 2025-01-28 RX ADMIN — KETOROLAC TROMETHAMINE 30 MG: 30 INJECTION, SOLUTION INTRAMUSCULAR at 06:57

## 2025-01-28 RX ADMIN — LIDOCAINE 1 PATCH: 4 PATCH TOPICAL at 08:18

## 2025-01-28 RX ADMIN — MORPHINE SULFATE 4 MG: 4 INJECTION, SOLUTION INTRAMUSCULAR; INTRAVENOUS at 08:19

## 2025-01-28 RX ADMIN — DIAZEPAM 5 MG: 5 TABLET ORAL at 06:55

## 2025-01-28 ASSESSMENT — COLUMBIA-SUICIDE SEVERITY RATING SCALE - C-SSRS
6. HAVE YOU EVER DONE ANYTHING, STARTED TO DO ANYTHING, OR PREPARED TO DO ANYTHING TO END YOUR LIFE?: NO
1. IN THE PAST MONTH, HAVE YOU WISHED YOU WERE DEAD OR WISHED YOU COULD GO TO SLEEP AND NOT WAKE UP?: NO
2. HAVE YOU ACTUALLY HAD ANY THOUGHTS OF KILLING YOURSELF IN THE PAST MONTH?: NO

## 2025-01-28 ASSESSMENT — ACTIVITIES OF DAILY LIVING (ADL)
ADLS_ACUITY_SCORE: 52
ADLS_ACUITY_SCORE: 52

## 2025-01-28 NOTE — ED NOTES
Pt reports no change in pain.  States when at xray, positioning her arm for xray procedure caused increase in pain especially in right rib area.  States it makes it more difficult to breathe.

## 2025-01-28 NOTE — ED PROVIDER NOTES
EMERGENCY DEPARTMENT ENCOUNTER      NAME: Evelyn Hudson  AGE: 54 year old female  YOB: 1970  MRN: [id number]  EVALUATION DATE & TIME: 2025  6:40 AM    PCP: Maribell Betancur    ED PROVIDER: Montrell Adams D.O.      Chief Complaint   Patient presents with    Shoulder Pain       FINAL IMPRESSION:  1. Right flank pain        ED COURSE & MEDICAL DECISION MAKIN:44 AM I met with the patient to gather history and to perform my initial exam. I discussed the plan for care while in the Emergency Department.  7:53 AM Rechecked and updated patient.  8:33 AM Rechecked and updated patient on rib XR imaging results. We discussed plans for discharge including supportive cares, symptomatic treatment, outpatient follow up, and reasons to return to the emergency department.             Pertinent Labs & Imaging studies reviewed. (See chart for details)  54 year old female presents to the Emergency Department for evaluation of right-sided flank pain.  Patient does report increased use of her right arm and reaching over the last couple of days resulting in this pain.  Patient did have reproducible pain on my exam primarily just inferior to the scapula on the right, and was exacerbated by movement.  Lab testing was performed as precaution, and there was no evidence that would be concerning for PE with the fact that the patient had a negative D-dimer.  X-ray does show old rib fractures, and her pain is in this area and I am concerned that she could have potentially exacerbated pain from these old injuries.  Therefore, at this time I believe patient is appropriate for discharge home with muscle relaxer, short course of oral pain medication, and instructions to use over-the-counter lidocaine patches.  Patient verbalized understand agreement the plan.  Return precautions are discussed    Medical Decision Making  Obtained supplemental history:Supplemental history obtained?: No  Reviewed external records: External  records reviewed?: Documented in chart  Care impacted by chronic illness:Alcohol and/or Drug Abuse or Dependence, Chronic Pain, Hypertension, and Other: MTHFR gene mutation  Did you consider but not order tests?: Work up considered but not performed and documented in chart, if applicable  Did you interpret images independently?: Independent interpretation of ECG and images noted in documentation, when applicable.  Consultation discussion with other provider:Did you involve another provider (consultant, , pharmacy, etc.)?: No  Discharge. I prescribed additional prescription strength medication(s) as charted. See documentation for any additional details.    MIPS: Not Applicable        At the conclusion of the encounter I discussed the results of all of the tests and the disposition. The questions were answered. The patient or family acknowledged understanding and was agreeable with the care plan.        HPI    Patient information was obtained from: Patient    Use of : N/A        Evelyn Hudson is a 54 year old female with a significant medical history of hypertension, chronic pain, MTHFR gene mutation, and hypercholesterolemia of alcohol abuse who presents to the emergency department with right shoulder pain. 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 The pain increases with deep breaths and had difficulty sleeping the past 2 nights. The patient will be going on vacation tomorrow and states that she wants relief before leaving.       PAST MEDICAL HISTORY:  Past Medical History:   Diagnosis Date    Acne     Adenoma of colon     Alcohol withdrawal seizure (H)     Alcoholism /alcohol abuse     Allergic rhinitis     Anxiety     Anxiety     Chronic pain     Hypertension     Hypokalemia     Hypomagnesemia     MTHFR gene mutation     Seasonal allergies        PAST SURGICAL HISTORY:  Past Surgical  History:   Procedure Laterality Date    COLECTOMY, RIGHT, ROBOT-ASSISTED, LAPAROSCOPIC, USING DA MYRON XI Right 1/31/2022    Procedure: ROBOTIC COLECTOMY RIGHT,;  Surgeon: Abner Wong MD;  Location: [facility]    COLON SURGERY      ENDOMETRIAL ABLATION      EXCISE POLYP RECTUM N/A 1/31/2022    Procedure: TRANSANAL EXCISION OF RECTAL POLYP;  Surgeon: Abner Wong MD;  Location: [facility] OR    INSERT INTRACORONARY STENT N/A 5/8/2017    Procedure: EXCISION BACK AND RIGHT ARM LIPOMA;  Surgeon: Rickey Anne MD;  Location: [facility];  Service:     IR INTERCOSTAL STEROID INJECTION SINGLE LEVEL  4/30/2020    IR INTERCOSTAL STEROID INJECTION SINGLE LEVEL  4/30/2020    RECTAL POLYPECTOMY      RECTAL PROLAPSE REPAIR  2014    SURGICAL PATHOLOGY EXAM      TUBAL LIGATION Bilateral 6/3/2014    Procedure: BILATERAL LAPAROSCOPIC TUBAL LIGATION ;  Surgeon: Lorena Jiménez MD;  Location: [facility];  Service:     WISDOM TOOTH EXTRACTION           CURRENT MEDICATIONS:    Current Facility-Administered Medications   Medication Dose Route Frequency Provider Last Rate Last Admin    Lidocaine (LIDOCARE) 4 % Patch 1 patch  1 patch Transdermal Once Montrell Adams, DO   1 patch at 01/28/25 0818     Current Outpatient Medications   Medication Sig Dispense Refill    HYDROcodone-acetaminophen (NORCO) 5-325 MG tablet Take 1 tablet by mouth every 6 hours as needed for breakthrough pain. 5 tablet 0    methocarbamol (ROBAXIN) 500 MG tablet Take 1 tablet (500 mg) by mouth 4 times daily as needed for muscle spasms. 30 tablet 0    amoxicillin (AMOXIL) 500 MG capsule Take 500 mg by mouth 2 times daily       Biotin 5000 MCG TABS Take 5,000 mcg by mouth daily       buPROPion (WELLBUTRIN XL) 300 MG 24 hr tablet Take 300 mg by mouth every morning.      calcium-vitamin D-vitamin K (VIACTIV) 500-500-40 MG-UNT-MCG CHEW Take 1 tablet by mouth 2 times daily      carboxymethylcellulose (REFRESH LIQUIGEL) 1 %  ophthalmic solution [CARBOXYMETHYLCELLULOSE (REFRESH LIQUIGEL) 1 % OPHTHALMIC SOLUTION] Apply 1 drop to eye 2 (two) times a day as needed.      cholecalciferol (VITAMIN D3) 125 mcg (5000 units) capsule Take 125 mcg by mouth daily       Cyanocobalamin (B-12) 1000 MCG TBCR Take 1,000 mcg by mouth daily      cyclobenzaprine (FLEXERIL) 10 MG tablet Take 1 tablet (10 mg) by mouth 3 times daily as needed for muscle spasms 20 tablet 0    escitalopram (LEXAPRO) 20 MG tablet Take 1 tablet (20 mg) by mouth daily 30 tablet 0    fluticasone (FLONASE) 50 MCG/ACT nasal spray Spray 1 spray into both nostrils daily as needed for rhinitis or allergies      folic acid (FOLVITE) 1 MG tablet Take 1 tablet (1 mg) by mouth daily 30 tablet 0    gabapentin (NEURONTIN) 300 MG capsule Take 300 mg by mouth 2 times daily.      glucosamine-chondroitin 500-400 mg cap Take 1 capsule by mouth daily       hydrOXYzine (ATARAX) 25 MG tablet Take 1 tablet (25 mg) by mouth 3 times daily as needed for anxiety 60 tablet 0    lactobacillus rhamnosus, GG, (CULTURELL) capsule Take 1 capsule by mouth every evening       levETIRAcetam (KEPPRA) 500 MG tablet Take 1 tablet (500 mg) by mouth 2 times daily 60 tablet 0    loratadine-pseudoePHEDrine (WAL-ITIN D 24 HOUR)  MG 24 hr tablet Take 1 tablet by mouth daily as needed for allergies or congestion       multiple vitamin  tablet TABS Take 1 tablet by mouth daily      nicotine polacrilex (NICORETTE) 4 MG gum Place 4 mg inside cheek every hour as needed for smoking cessation       nystatin (MYCOSTATIN) 690094 UNIT/GM external cream Apply topically daily as needed       Omega-3 Fatty Acids (FISH OIL) 1200 MG capsule Take 1,200 mg by mouth daily      propranolol ER (INDERAL LA) 80 MG 24 hr capsule Take 80 mg by mouth daily      thiamine (B-1) 100 MG tablet Take 1 tablet (100 mg) by mouth daily 30 tablet 0    traZODone (DESYREL) 50 MG tablet Take 1-2 tablets ( mg) by mouth At Bedtime 60 tablet 0     "triamcinolone (ARISTOCORT HP) 0.5 % external cream Apply topically 2 times daily as needed       vitamin B6 (PYRIDOXINE) 100 MG tablet Take 100 mg by mouth daily      vitamin C (ASCORBIC ACID) 1000 MG TABS Take 1,000 mg by mouth daily      vitamin E (TOCOPHEROL) 400 units (180 mg) capsule Take 400 Units by mouth daily            ALLERGIES:  Allergies   Allergen Reactions    Ciprofloxacin Anaphylaxis     Throat swelling       FAMILY HISTORY:  Family History   Problem Relation Age of Onset    Chronic Obstructive Pulmonary Disease Father        SOCIAL HISTORY:  Social History     Socioeconomic History    Marital status:    Tobacco Use    Smoking status: Every Day     Current packs/day: 1.00     Average packs/day: 1 pack/day for 37.1 years (37.1 ttl pk-yrs)     Types: Cigarettes     Start date: 1988    Smokeless tobacco: Never    Tobacco comments:     seen by TTS 2/1/2022   Substance and Sexual Activity    Alcohol use: Not Currently     Alcohol/week: 18.0 standard drinks of alcohol     Types: 18 Standard drinks or equivalent per week    Drug use: No       VITALS:  Patient Vitals for the past 24 hrs:   BP Temp Temp src Pulse Resp SpO2 Height Weight   01/28/25 0832 -- -- -- 86 16 100 % -- --   01/28/25 0636 (!) 165/93 97.5  F (36.4  C) Temporal 100 24 100 % 1.626 m (5' 4\") 59.9 kg (132 lb)       PHYSICAL EXAM    VITAL SIGNS: BP (!) 165/93   Pulse 86   Temp 97.5  F (36.4  C) (Temporal)   Resp 16   Ht 1.626 m (5' 4\")   Wt 59.9 kg (132 lb)   SpO2 100%   BMI 22.66 kg/m      General: Appears in no acute distress, awake, alert, interactive.  Eyes: Conjunctivae non-injected. Sclera anicteric.  HENT: Atraumatic, normocephalic  Neck: Supple, normal ROM  Respiratory/Chest: Respiration unlabored, no tachypnea  Abdomen: non distended  Musculoskeletal: Normal extremities. No edema or erythema. Tenderness to palpation of right flank inferior to scapula with muscle spasm.  Skin: Normal color. No rash or " diaphoresis.  Neurologic: Alert and oriented, face symmetric, moves all extremities spontaneously. Speech clear.  Psychiatric:  Affect normal, Judgment normal, Mood normal.        LABS  Results for orders placed or performed during the hospital encounter of 01/28/25 (from the past 24 hours)   XR Shoulder Right G/E 3 Views    Narrative    EXAM: XR SHOULDER RIGHT G/E 3 VIEWS  LOCATION: [facility]  DATE: 1/28/2025    INDICATION: pain  COMPARISON: None.      Impression    IMPRESSION: Normal alignment. No acute fracture. Mild degenerative arthritis of the glenohumeral joint. Chronic likely healed right sixth rib fracture and chronic incompletely united or ununited right seventh and eighth rib fractures.   UA with Microscopic reflex to Culture    Specimen: Urine, Clean Catch   Result Value Ref Range    Color Urine Light Yellow Colorless, Straw, Light Yellow, Yellow    Appearance Urine Clear Clear    Glucose Urine Negative Negative mg/dL    Bilirubin Urine Negative Negative    Ketones Urine Negative Negative mg/dL    Specific Gravity Urine 1.004 1.001 - 1.030    Blood Urine Negative Negative    pH Urine 7.0 5.0 - 7.0    Protein Albumin Urine Negative Negative mg/dL    Urobilinogen Urine <2.0 <2.0 mg/dL    Nitrite Urine Negative Negative    Leukocyte Esterase Urine 25 [place]/uL (A) Negative    RBC Urine <1 <=2 /HPF    WBC Urine 4 <=5 /HPF    Squamous Epithelials Urine 1 <=1 /HPF    Narrative    Urine Culture not indicated   CBC with platelets + differential    Narrative    The following orders were created for panel order CBC with platelets + differential.  Procedure                               Abnormality         Status                     ---------                               -----------         ------                     CBC with platelets and d...[[id number]]  Abnormal            Final result                 Please view results for these tests on the individual orders.   Basic metabolic panel    Result Value Ref Range    Sodium 131 (L) 135 - 145 mmol/L    Potassium 4.0 3.4 - 5.3 mmol/L    Chloride 97 (L) 98 - 107 mmol/L    Carbon Dioxide (CO2) 23 22 - 29 mmol/L    Anion Gap 11 7 - 15 mmol/L    Urea Nitrogen 6.0 6.0 - 20.0 mg/dL    Creatinine 0.67 0.51 - 0.95 mg/dL    GFR Estimate >90 >60 mL/min/1.73m2    Calcium 10.0 8.8 - 10.4 mg/dL    Glucose 103 (H) 70 - 99 mg/dL   D dimer quantitative   Result Value Ref Range    D-Dimer Quantitative 0.34 0.00 - 0.50 ug/mL FEU    Narrative    This D-dimer assay is intended for use in conjunction with a clinical pretest probability assessment model to exclude pulmonary embolism (PE) and deep venous thrombosis (DVT) in outpatients suspected of PE or DVT. The cut-off value is 0.50 ug/mL FEU.    For patients 50 years of age or older, the application of age-adjusted cut-off values for D-Dimer may increase the specificity without significant effect on sensitivity. The literature suggested calculation age adjusted cut-off in ug/L = age in years x 10 ug/L. The results in this laboratory are reported as ug/mL rather than ug/L. The calculation for age adjusted cut off in ug/mL= age in years x 0.01 ug/mL. For example, the cut off for a 76 year old male is 76 x 0.01 ug/mL = 0.76 ug/mL (760 ug/L).    M Joe et al. Age adjusted D-dimer cut-off levels to rule out pulmonary embolism: The ADJUST-PE Study. AN 2014;311:8871-6498.; HJ Dari et al. Diagnostic accuracy of conventional or age adjusted D-dimer cutoff values in older patients with suspected venous thromboembolism. Systemic review and meta-analysis. BMJ 2013:346:f2492.   CBC with platelets and differential   Result Value Ref Range    WBC Count 10.3 4.0 - 11.0 10e3/uL    RBC Count 4.04 3.80 - 5.20 10e6/uL    Hemoglobin 13.7 11.7 - 15.7 g/dL    Hematocrit 39.1 35.0 - 47.0 %    MCV 97 78 - 100 fL    MCH 33.9 (H) 26.5 - 33.0 pg    MCHC 35.0 31.5 - 36.5 g/dL    RDW 11.9 10.0 - 15.0 %    Platelet Count 438 150 - 450 10e3/uL     % Neutrophils 70 %    % Lymphocytes 18 %    % Monocytes 7 %    % Eosinophils 4 %    % Basophils 1 %    % Immature Granulocytes 0 %    NRBCs per 100 WBC 0 <1 /100    Absolute Neutrophils 7.2 1.6 - 8.3 10e3/uL    Absolute Lymphocytes 1.9 0.8 - 5.3 10e3/uL    Absolute Monocytes 0.7 0.0 - 1.3 10e3/uL    Absolute Eosinophils 0.4 0.0 - 0.7 10e3/uL    Absolute Basophils 0.1 0.0 - 0.2 10e3/uL    Absolute Immature Granulocytes 0.0 <=0.4 10e3/uL    Absolute NRBCs 0.0 10e3/uL   Ribs XR, unilat 3 views + PA chest, right    Narrative    EXAM: XR RIBS and CHEST RT 3VW  LOCATION: [facility]  DATE: 1/28/2025    INDICATION: Right chest wall pain.  COMPARISON: 12/2/2024..      Impression    IMPRESSION: Old fracture deformities of the posterolateral right sixth, seventh, eighth, ninth, 10th, and 11th ribs, with hypertrophic bone from healing. No significant change in the appearance from 12/2/2024. Symptom marker along the lateral aspect of   the chest in the region of the eighth and ninth ribs. No definitely acute fracture visualized in the right chest wall, or elsewhere. No concerning bone lesion. Moderate-advanced degenerative arthritis in the bilateral glenohumeral joints. Lungs are clear   without acute infiltrate or pleural effusion. Normal cardiac size and silhouette.         RADIOLOGY  Ribs XR, unilat 3 views + PA chest, right   Final Result   IMPRESSION: Old fracture deformities of the posterolateral right sixth, seventh, eighth, ninth, 10th, and 11th ribs, with hypertrophic bone from healing. No significant change in the appearance from 12/2/2024. Symptom marker along the lateral aspect of    the chest in the region of the eighth and ninth ribs. No definitely acute fracture visualized in the right chest wall, or elsewhere. No concerning bone lesion. Moderate-advanced degenerative arthritis in the bilateral glenohumeral joints. Lungs are clear    without acute infiltrate or pleural effusion. Normal  cardiac size and silhouette.      XR Shoulder Right G/E 3 Views   Final Result   IMPRESSION: Normal alignment. No acute fracture. Mild degenerative arthritis of the glenohumeral joint. Chronic likely healed right sixth rib fracture and chronic incompletely united or ununited right seventh and eighth rib fractures.             MEDICATIONS GIVEN IN THE EMERGENCY:  Medications   Lidocaine (LIDOCARE) 4 % Patch 1 patch (1 patch Transdermal $Patch/Med Applied 1/28/25 0818)   diazepam (VALIUM) tablet 5 mg (5 mg Oral $Given 1/28/25 0655)   ketorolac (TORADOL) injection 30 mg (30 mg Intramuscular $Given 1/28/25 0657)   morphine (PF) injection 4 mg (4 mg Intravenous $Given 1/28/25 0819)       NEW PRESCRIPTIONS STARTED AT TODAY'S ER VISIT  Discharge Medication List as of 1/28/2025  8:42 AM        START taking these medications    Details   HYDROcodone-acetaminophen (NORCO) 5-325 MG tablet Take 1 tablet by mouth every 6 hours as needed for breakthrough pain., Disp-5 tablet, R-0, Local Print      methocarbamol (ROBAXIN) 500 MG tablet Take 1 tablet (500 mg) by mouth 4 times daily as needed for muscle spasms., Disp-30 tablet, R-0, Local Print              I, Reese Lazcano, am serving as a scribe to document services personally performed by Montrell Adams D.O., based on my observations and the provider's statements to me.  I, Montrell Adams D.O., attest that Reese Lazcano is acting in a scribe capacity, has observed my performance of the services and has documented them in accordance with my direction.     Montrell Adams D.O.  Emergency Medicine  [facility] EMERGENCY ROOM  [address], [place] [id number] [phone]  Dept: [phone]       Montrell Adams,   01/28/25 1054

## 2025-01-28 NOTE — ED TRIAGE NOTES
Patient presents to the ED complaining of right shoulder pain since Saturday.  She states she has been doing construction clean up at her house and can feel a knot in her right shoulder area.  She took two Aleve before bedtime last night with no relief.  She is leaving for vacation tomorrow so she wanted to get the pain under control before then.     Triage Assessment (Adult)       Row Name 01/28/25 0637          Triage Assessment    Airway WDL WDL        Respiratory WDL    Respiratory WDL WDL        Skin Circulation/Temperature WDL    Skin Circulation/Temperature WDL WDL        Cardiac WDL    Cardiac WDL WDL        Peripheral/Neurovascular WDL    Peripheral Neurovascular WDL WDL        Cognitive/Neuro/Behavioral WDL    Cognitive/Neuro/Behavioral WDL WDL

## 2025-01-28 NOTE — DISCHARGE INSTRUCTIONS
You can use over the counter Lidocaine patches.  Follow up with your Primary Care Provider as soon as possible.  Return to the ER for worsening symptoms or any other concern.

## 2025-02-10 ENCOUNTER — TELEPHONE (OUTPATIENT)
Dept: PULMONOLOGY | Facility: CLINIC | Age: 55
End: 2025-02-10
Payer: COMMERCIAL

## 2025-02-10 DIAGNOSIS — J45.30 MILD PERSISTENT REACTIVE AIRWAY DISEASE WITHOUT COMPLICATION: Primary | ICD-10-CM

## 2025-02-10 RX ORDER — BUDESONIDE AND FORMOTEROL FUMARATE DIHYDRATE 160; 4.5 UG/1; UG/1
2 AEROSOL RESPIRATORY (INHALATION) 2 TIMES DAILY
Qty: 10.2 G | Refills: 11 | Status: SHIPPED | OUTPATIENT
Start: 2025-02-10

## 2025-02-10 NOTE — TELEPHONE ENCOUNTER
Returning patient's phone message from Friday.  States that she called insurance and it sounded like any ICS/LABA would be covered, but would require PA.    Will send new orders for symbicort and go from there.   Patient states that she has used Advair previously and did not feel that it worked for her.

## 2025-04-05 ENCOUNTER — HEALTH MAINTENANCE LETTER (OUTPATIENT)
Age: 55
End: 2025-04-05

## 2025-05-07 ENCOUNTER — OFFICE VISIT (OUTPATIENT)
Dept: PULMONOLOGY | Facility: CLINIC | Age: 55
End: 2025-05-07
Attending: NURSE PRACTITIONER
Payer: COMMERCIAL

## 2025-05-07 VITALS
WEIGHT: 140.4 LBS | BODY MASS INDEX: 23.95 KG/M2 | DIASTOLIC BLOOD PRESSURE: 68 MMHG | OXYGEN SATURATION: 98 % | SYSTOLIC BLOOD PRESSURE: 104 MMHG | HEART RATE: 98 BPM

## 2025-05-07 DIAGNOSIS — J45.30 MILD PERSISTENT REACTIVE AIRWAY DISEASE WITHOUT COMPLICATION: Primary | ICD-10-CM

## 2025-05-07 DIAGNOSIS — Z72.0 TOBACCO ABUSE: ICD-10-CM

## 2025-05-07 PROCEDURE — 3078F DIAST BP <80 MM HG: CPT | Performed by: NURSE PRACTITIONER

## 2025-05-07 PROCEDURE — 3074F SYST BP LT 130 MM HG: CPT | Performed by: NURSE PRACTITIONER

## 2025-05-07 PROCEDURE — 99214 OFFICE O/P EST MOD 30 MIN: CPT | Performed by: NURSE PRACTITIONER

## 2025-05-07 NOTE — PATIENT INSTRUCTIONS
It was a pleasure seeing you in clinic today. This is what we discussed:    Continue the Symbicort as you have been.   Use your albuterol every 4 hours as needed for cough, shortness of breath, wheeze, or chest tightness.   Continue to try and quit smoking   Follow up 6 months with repeat test.   If you have worsening symptoms, questions, or need to speak with the nurse please call [phone].

## 2025-05-07 NOTE — PROGRESS NOTES
"  Pulmonary Outpatient Clinic Note  May 7, 2025      Assessment and Plan:   Evelyn Hudson is a 54 year old F, smoker, with history of allergic rhinitis, anxiety, chronic pain, and alcohol abuse with previous hospital stay for withdrawal seizures presenting today for follow up of chronic cough.  Reports history of baseline chronic cough that is mild and not very bothersome. Will have intermittent \"flares\" with increased cough, wheeze, mucous, and shortness of breath. Does not get much relief from antibiotics, oral steroids, albuterol, or cough syrup. Significant improvement with ICS/LABA. She is still smoking daily and has a hx of environmental allergies. Denies significant reflux symptoms.  PFTs show mild obstruction, no bronchodilator response tested. There is air trapping. Diffusing capacity when corrected for hemoglobin is normal. She did use her ICS prior to testing.  Recent CT chest clear airways and normal lung parenchyma.  There is a very minimal amount of emphysema in upper lobes.    On exam and SpO2 on room air today are normal.    #. Cough, asthma v. COPD: Given spirometry and nature of flares in symptoms with illness and possible environmental triggers this is likely an asthma process.  With her smoking history and minimal emphysema this could be more chronic and fit with COPD. Without bronchodilator response on testing difficult to determine. We will continue the ICS/LABA with plans to repeat spirometry in 6 months.  Continue Symbicort, 2 puffs BID. Rinse and gargle after use.   Continue albuterol as needed  Consider pulmonary rehab  If symptoms worsen will  obtain IgE, [facility] allergy panel, and esophagram.  #.  Tobacco abuse: Residual cough could be due to to continued smoking. I highly encourage cessation today. We did discuss that her lung function would likely continue to worsen if she does not stop smoking.  She is motivated to do so.  #.  Pulmonary nodules: Left upper lobe 5 x 7 mm spiculated " "cavitary lesion noted in 11/2021.  Resolved on most recent imaging from 01/2025.  Qualifies for yearly LDCT for lung cancer screening. Due in 01/2026.   #.  HCM: UTD with COVID-vaccine (10/2022), PPSV23, Tdap (2018)  Recommend she stay up-to-date with respiratory vaccines.  I recommend updated COVID booster and PCV 20.  Recommended she stay active as able    If her symptoms exacerbate: prednisone 40 mg daily x 5 days.  If sputum amount or thickness increased add azithromycin Z-Nikhil x 5 days.    RTC 6 months with PFTs    Sayda Urbano CNP  Pulmonary Medicine  ______________________________________________________________________________    CC:   Chief Complaint   Patient presents with    Follow Up     Follow up     HPI:   Yi presents today for follow up. She was last seen in clinic for initial evaluation in 02/2025.   Fees Symbicort is much more helpful than QVAR. She does have some break through cough.   Has some seasonal allergies. Is using Flonase.     Cough started in 10/2024 following URI symptoms.  Seen in multiple times in PCP office for cough.  Treated with multiple rounds of antibiotics and prednisone with no change.   Albuterol was not helpful.  Advair for a few weeks not helpful.   Chest x-ray on 12/2 and CT chest on 1/8 were normal.  Reports \"every 6 months I get this\". Will have harsh cough that is occasionally vomit inducing. Will have increased cough with occasional sputum, wheeze, and chest tightness.   No obvious seasonal pattern. Does report baseline chronic cough.    History of environmental allergies. Had allergy testing years ago. Per pt allergic to ragweed, dust, cats, mold.   She is a smoker. <5 cigarettes per day, usually does not finish them.   Admits to some PND, very minimal nasal congestion currently. Sinus drainage was worse when working as a .      Will occasionally have reflux symptoms. Usually food triggered and not frequent.   Sober currently none since 1/12, went " through treatment around 1 year ago. Has had intermittent relapse over the years.     PMH:  Patient Active Problem List    Diagnosis Date Noted    Acute post-operative pain 02/03/2022     Priority: Medium    S/P right colectomy 01/31/2022     Priority: Medium    Chronic alcohol abuse 01/31/2022     Priority: Medium    Alcohol withdrawal syndrome without complication (H) 01/13/2022     Priority: Medium    HTN (hypertension) 09/01/2021     Priority: Medium    Contusion of right foot, initial encounter 08/27/2021     Priority: Medium    Alcohol withdrawal seizure (H) 08/27/2021     Priority: Medium    Alcoholism /alcohol abuse      Priority: Medium     Replacing diagnoses that were inactivated after the 10/1/2021 regulatory import.      Hypokalemia      Priority: Medium    Hypomagnesemia      Priority: Medium    Closed fracture of four ribs, unspecified laterality, initial encounter 04/28/2020     Priority: Medium    Intractable pain 04/28/2020     Priority: Medium    Alcohol withdrawal, uncomplicated (H) 04/26/2020     Priority: Medium    Fall, initial encounter      Priority: Medium    Multiple rib fractures involving four or more ribs 04/25/2020     Priority: Medium    Abnormal uterine bleeding 06/03/2014     Priority: Medium    Anxiety state 12/01/2009     Priority: Medium     Overview:   lexapro - too expensive  Zoloft - not helpful, taken irregularly  Celexa 12/2009        Chemical dependency (H) 12/01/2009     Priority: Medium     Overview:   Alcohol  Treatment programs - [facility] at [address] - 2006  Using to treat anxiety 11/2009        Seasonal allergies 12/01/2009     Priority: Medium       PSH:  Past Surgical History:   Procedure Laterality Date    COLECTOMY, RIGHT, ROBOT-ASSISTED, LAPAROSCOPIC, USING DA MYRON XI Right 1/31/2022    Procedure: ROBOTIC COLECTOMY RIGHT,;  Surgeon: Abner Wong MD;  Location: [facility] OR    COLON SURGERY      ENDOMETRIAL ABLATION       EXCISE POLYP RECTUM N/A 1/31/2022    Procedure: TRANSANAL EXCISION OF RECTAL POLYP;  Surgeon: Abner Wong MD;  Location: [facility] OR    INSERT INTRACORONARY STENT N/A 5/8/2017    Procedure: EXCISION BACK AND RIGHT ARM LIPOMA;  Surgeon: Rickey Anne MD;  Location: [facility];  Service:     IR INTERCOSTAL STEROID INJECTION SINGLE LEVEL  4/30/2020    IR INTERCOSTAL STEROID INJECTION SINGLE LEVEL  4/30/2020    RECTAL POLYPECTOMY      RECTAL PROLAPSE REPAIR  2014    SURGICAL PATHOLOGY EXAM      TUBAL LIGATION Bilateral 6/3/2014    Procedure: BILATERAL LAPAROSCOPIC TUBAL LIGATION ;  Surgeon: Lorena Jiménez MD;  Location: [facility];  Service:     WISDOM TOOTH EXTRACTION         Current Meds:  Current Outpatient Medications   Medication Sig Dispense Refill    amoxicillin (AMOXIL) 500 MG capsule Take 500 mg by mouth 2 times daily       budesonide-formoterol (SYMBICORT/BREYNA) 160-4.5 MCG/ACT Inhaler Inhale 2 puffs into the lungs 2 times daily. 10.2 g 11    buPROPion (WELLBUTRIN XL) 300 MG 24 hr tablet Take 300 mg by mouth every morning.      calcium-vitamin D-vitamin K (VIACTIV) 500-500-40 MG-UNT-MCG CHEW Take 1 tablet by mouth 2 times daily      carboxymethylcellulose (REFRESH LIQUIGEL) 1 % ophthalmic solution [CARBOXYMETHYLCELLULOSE (REFRESH LIQUIGEL) 1 % OPHTHALMIC SOLUTION] Apply 1 drop to eye 2 (two) times a day as needed.      cyclobenzaprine (FLEXERIL) 10 MG tablet Take 1 tablet (10 mg) by mouth 3 times daily as needed for muscle spasms 20 tablet 0    escitalopram (LEXAPRO) 20 MG tablet Take 1 tablet (20 mg) by mouth daily 30 tablet 0    fluticasone (FLONASE) 50 MCG/ACT nasal spray Spray 1 spray into both nostrils daily as needed for rhinitis or allergies      folic acid (FOLVITE) 1 MG tablet Take 1 tablet (1 mg) by mouth daily 30 tablet 0    gabapentin (NEURONTIN) 300 MG capsule Take 300 mg by mouth 2 times daily.      glucosamine-chondroitin 500-400 mg cap Take 1 capsule by mouth  daily       guaiFENesin-codeine (ROBITUSSIN AC) 100-10 MG/5ML solution 10 mL as needed Orally every 6 hrs prn cough      HYDROcodone-acetaminophen (NORCO) 5-325 MG tablet 1 tablet as needed Orally every 6 hrs      hydrocortisone (ANUSOL-HC) 25 MG suppository INSERT ONE SUPPOSITORY RECTALLY AT BEDTIME AS NEEDED      hydrOXYzine (ATARAX) 25 MG tablet Take 1 tablet (25 mg) by mouth 3 times daily as needed for anxiety 60 tablet 0    lactobacillus rhamnosus, GG, (CULTURELL) capsule Take 1 capsule by mouth every evening       levETIRAcetam (KEPPRA) 500 MG tablet Take 1 tablet (500 mg) by mouth 2 times daily 60 tablet 0    loratadine-pseudoePHEDrine (WAL-ITIN D 24 HOUR)  MG 24 hr tablet Take 1 tablet by mouth daily as needed for allergies or congestion       methocarbamol (ROBAXIN) 500 MG tablet Take 1 tablet (500 mg) by mouth 4 times daily as needed for muscle spasms. 30 tablet 0    multiple vitamin  tablet TABS Take 1 tablet by mouth daily      nicotine polacrilex (NICORETTE) 4 MG gum Place 4 mg inside cheek every hour as needed for smoking cessation       nitroFURantoin macrocrystal-monohydrate (MACROBID) 100 MG capsule TAKE ONE CAPSULE BY MOUTH EVERY 12 HOURS WITH FOOD      nystatin (MYCOSTATIN) 763306 UNIT/GM external cream Apply topically daily as needed       propranolol ER (INDERAL LA) 80 MG 24 hr capsule Take 80 mg by mouth daily      thiamine (B-1) 100 MG tablet Take 1 tablet (100 mg) by mouth daily 30 tablet 0    traZODone (DESYREL) 50 MG tablet Take 1-2 tablets ( mg) by mouth At Bedtime 60 tablet 0    triamcinolone (ARISTOCORT HP) 0.5 % external cream Apply topically 2 times daily as needed       VENTOLIN  (90 Base) MCG/ACT inhaler INHALE TWO PUFFS BY MOUTH FOUR TIMES A DAY for 25      vitamin E (TOCOPHEROL) 400 units (180 mg) capsule Take 400 Units by mouth daily       Biotin 5000 MCG TABS Take 5,000 mcg by mouth daily       cholecalciferol (VITAMIN D3) 125 mcg (5000 units) capsule Take 125  mcg by mouth daily       Cyanocobalamin (B-12) 1000 MCG TBCR Take 1,000 mcg by mouth daily      Omega-3 Fatty Acids (FISH OIL) 1200 MG capsule Take 1,200 mg by mouth daily      vitamin B6 (PYRIDOXINE) 100 MG tablet Take 100 mg by mouth daily      vitamin C (ASCORBIC ACID) 1000 MG TABS Take 1,000 mg by mouth daily       No current facility-administered medications for this visit.       Allergies:  Allergies   Allergen Reactions    Ciprofloxacin Anaphylaxis     Throat swelling       Social Hx:  Marital status: lives with fiance   Resides in a house, no concern for mold. No previous radon testing.   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    service: none  Pets: none currently. Previously had a dog.   Smoking history: 40+ pack year history  Alcohol use: none currently   Recreational drug use: none. No vape.     Family HX: family history includes Chronic Obstructive Pulmonary Disease in her father.    ROS:   10-point review performed and notable for the above-mentioned symptoms. The remainder reviewed and negative.     Physical Exam:  /68 (BP Location: Right arm, Patient Position: Sitting, Cuff Size: Adult Regular)   Pulse 98   Wt 63.7 kg (140 lb 6.4 oz)   SpO2 98%   BMI 23.95 kg/m      Physical Exam  Constitutional:       General: She is not in acute distress.     Appearance: She is not ill-appearing.   Cardiovascular:      Rate and Rhythm: Normal rate and regular rhythm.      Heart sounds: Normal heart sounds.   Pulmonary:      Effort: Pulmonary effort is normal. No respiratory distress.      Breath sounds: No wheezing or rhonchi.      Comments: No cough during visit  Neurological:      Mental Status: She is alert.   Psychiatric:         Behavior: Behavior normal.       PFT's     2/7/2025:    Impression:  Full Pulmonary Function Test is abnormal.  PFTs are consistent with mild obstructive disease.  There is no  hyperinflation.  There is air-trapping.  Diffusion capacity when corrected for hemoglobin is not reduced.    Labs:   personally reviewed in the EMR.   Latest Reference Range & Units 01/28/25 08:04   Absolute Eosinophils 0.0 - 0.7 10e3/uL 0.4     Imaging studies: personally reviewed and interpreted. Below are the Radiology interpretations.    CT chest without, 1/8/2025 at [facility]  The lungs are clear.  No suspicious pulmonary nodules or masses.  Negative for bronchiectasis or other airway abnormalities.  Conclusion:  No suspicious pulmonary nodules or masses.  No enlarged or abnormal mediastinal or hilar lymph nodes.    CT chest without, 4/26/2023 at [facility]  Small mediastinal lymph nodes are slightly increased in size from prior study.  Will size criteria for pathologic enlargement.  Largest, right high paratracheal (series 2, image 7): 0.9 cm, previously 0.6 cm.  Increased bronchial wall thickening and distal bronchial filling compared to 8/2022.  Minimal thickening in the region of previously described left upper lobe nodule with limited measurable component compared to 3/2022 (series 602, image 132).  Subtle hazy tree-in-bud nodularity is redemonstrated not significantly changed.  No consolidation.  Trace left pleural effusion, new.  Impression:  Minimal residual thickening remains in the region of the left upper lobe nodule initially described to 3/2022.  Diffuse bronchiolitis is slightly worse from 8/2022.  New left trace pleural effusion.  Small mediastinal lymph nodes are increased in size from prior though still below size criteria for pathologic enlargement.  Favor reactive etiology.

## (undated) DEVICE — ESU ELEC BLADE 6" COATED E1450-6

## (undated) DEVICE — SYR 10ML LL W/O NDL 302995

## (undated) DEVICE — SUCTION MANIFOLD NEPTUNE 2 SYS 1 PORT 702-025-000

## (undated) DEVICE — TAPE ADH POROUS 3IN CURITY STD 7046C

## (undated) DEVICE — ENDO TROCAR CONMED AIRSEAL BLADELESS 12X120MM IAS12-120LP

## (undated) DEVICE — GOWN XLG DISP 9545

## (undated) DEVICE — TUBING FILTER TRI-LUMEN AIRSEAL ASC-EVAC1

## (undated) DEVICE — CUSTOM PACK COLON CLOSING SBA5BCCHEA

## (undated) DEVICE — BRIEF STRETCH XL MPS40

## (undated) DEVICE — CATH FOLEY 5CC 16FR SIL/LTX 0165V16S

## (undated) DEVICE — SUTURE SILK 0 TIES 30IN SA86G

## (undated) DEVICE — GLOVE BIOGEL INDICATOR 7.5 LF 41675

## (undated) DEVICE — DRAPE OR LAPAROTOMY KC 89228*

## (undated) DEVICE — DAVINCI XI DRAPE COLUMN 470341

## (undated) DEVICE — SOL NACL 0.9% IRRIG 1000ML BOTTLE 2F7124

## (undated) DEVICE — PREP DURAPREP 26ML APL 8630

## (undated) DEVICE — DAVINCI XI SEAL UNIVERSAL 5-8MM 470361

## (undated) DEVICE — PREP POVIDONE IODINE SOLUTION 10% 4OZ BOTTLE 29906-004

## (undated) DEVICE — STPL LINEAR CUT 75MM TLC75

## (undated) DEVICE — LUBRICANT INST ELECTROLUBE EL101

## (undated) DEVICE — DECANTER VIAL 2006S

## (undated) DEVICE — DAVINCI HOT SHEARS TIP COVER  400180

## (undated) DEVICE — DRSG KERLIX FLUFFS X5

## (undated) DEVICE — GLOVE SURG PI ULTRA TOUCH M SZ 7-1/2 LF

## (undated) DEVICE — DRAPE U SPLIT 74X120" 29440

## (undated) DEVICE — GLOVE UNDER INDICATOR PI SZ 7.0 LF 41670

## (undated) DEVICE — CUSTOM PACK GEN MAJOR SBA5BGMHEA

## (undated) DEVICE — DRAPE IOBAN INCISE 23X17" 6650EZ

## (undated) DEVICE — PLATE GROUNDING ADULT W/CORD 9165L

## (undated) DEVICE — CUSTOM PACK DA VINCI GYN SMA5BDVHEA

## (undated) DEVICE — SYR 50ML SLIP TIP W/O NDL 309654

## (undated) DEVICE — SUCTION TIP POOLE STERILE 35040

## (undated) DEVICE — TUBING SUCTION 12"X1/4" N612

## (undated) DEVICE — TUBING INSUFFLATION W/FILTER CPC TO LUER 620-030-301

## (undated) DEVICE — ADH SKIN CLOSURE PREMIERPRO EXOFIN 1.0ML 3470

## (undated) DEVICE — Device

## (undated) DEVICE — BLADE KNIFE SURG 15 371115

## (undated) DEVICE — SYSTEM CLEARIFY VISUALIZATION 21-345

## (undated) DEVICE — DRAPE LAP/CHOLE W/O POUCH 89225

## (undated) DEVICE — LUBRICANT SURG 2 OZ STRL FLIP TOP 2OZLUB

## (undated) DEVICE — SUTURE PDS 1 54IN TP1+ VIOLET PDP879G

## (undated) DEVICE — SU MONOCRYL+ 4-0 18IN PS2 UND MCP496G

## (undated) DEVICE — ENDO TROCAR SHIELDED BLADED KII Z-THRD 05X100MM CTB03

## (undated) DEVICE — DAVINCI XI DRAPE ARM 470015

## (undated) DEVICE — PAD POS XL 1X20X40IN PINK PIGAZZI

## (undated) DEVICE — SPONGE LAP 12X12" X8425

## (undated) DEVICE — SU VICRYL+ 3-0 27IN SH UND VCP416H

## (undated) DEVICE — GLOVE BIOGEL PI SZ 7.0 40870

## (undated) DEVICE — SOL WATER IRRIG 1000ML BOTTLE 2F7114

## (undated) DEVICE — SUCTION TIP YANKAUER W/O VENT K86

## (undated) DEVICE — STPL LINEAR CUT 60X3.5MM TX60B

## (undated) DEVICE — GOWN LG DISP 9515

## (undated) DEVICE — GLOVE BIOGEL PI INDICATOR 8.0 LF 41680

## (undated) DEVICE — DAVINCI XI OBTURATOR BLADELESS 8MM 470359

## (undated) DEVICE — ESU PENCIL SMOKE EVAC W/ROCKER SWITCH 0703-047-000

## (undated) DEVICE — NEEDLE HYPO 22X1-1/2 SAFETY 305900

## (undated) DEVICE — NDL INSUFFLATION 13GA 120MM C2201

## (undated) DEVICE — LEGGINGS 31X48" LF KM89408

## (undated) RX ORDER — BACITRACIN ZINC 500 [USP'U]/G
OINTMENT TOPICAL
Status: DISPENSED
Start: 2022-01-31

## (undated) RX ORDER — BUPIVACAINE HYDROCHLORIDE 2.5 MG/ML
INJECTION, SOLUTION EPIDURAL; INFILTRATION; INTRACAUDAL
Status: DISPENSED
Start: 2022-01-31